# Patient Record
Sex: MALE | Race: BLACK OR AFRICAN AMERICAN | Employment: OTHER | URBAN - METROPOLITAN AREA
[De-identification: names, ages, dates, MRNs, and addresses within clinical notes are randomized per-mention and may not be internally consistent; named-entity substitution may affect disease eponyms.]

---

## 2017-02-10 ENCOUNTER — ANESTHESIA EVENT (OUTPATIENT)
Dept: OPERATING ROOM | Age: 67
End: 2017-02-10
Payer: MEDICARE

## 2017-02-13 ENCOUNTER — ANESTHESIA (OUTPATIENT)
Dept: OPERATING ROOM | Age: 67
End: 2017-02-13
Payer: MEDICARE

## 2017-02-13 ENCOUNTER — HOSPITAL ENCOUNTER (OUTPATIENT)
Age: 67
Setting detail: OUTPATIENT SURGERY
Discharge: HOME | End: 2017-02-13
Attending: INTERNAL MEDICINE | Admitting: INTERNAL MEDICINE
Payer: MEDICARE

## 2017-02-13 VITALS
BODY MASS INDEX: 34.38 KG/M2 | RESPIRATION RATE: 18 BRPM | SYSTOLIC BLOOD PRESSURE: 143 MMHG | OXYGEN SATURATION: 99 % | DIASTOLIC BLOOD PRESSURE: 65 MMHG | HEIGHT: 69 IN | TEMPERATURE: 97 F | WEIGHT: 232.14 LBS | HEART RATE: 70 BPM

## 2017-02-13 VITALS — DIASTOLIC BLOOD PRESSURE: 62 MMHG | SYSTOLIC BLOOD PRESSURE: 141 MMHG | OXYGEN SATURATION: 100 %

## 2017-02-13 LAB
CULTURE: NORMAL
CULTURE: NORMAL
Lab: NORMAL
SPECIMEN DESCRIPTION: NORMAL
STATUS: NORMAL

## 2017-02-13 PROCEDURE — 2580000003 HC RX 258: Performed by: ANESTHESIOLOGY

## 2017-02-13 PROCEDURE — 6360000002 HC RX W HCPCS: Performed by: ANESTHESIOLOGY

## 2017-02-13 PROCEDURE — 93005 ELECTROCARDIOGRAM TRACING: CPT

## 2017-02-13 PROCEDURE — 88305 TISSUE EXAM BY PATHOLOGIST: CPT

## 2017-02-13 PROCEDURE — 3609017100 HC EGD: Performed by: INTERNAL MEDICINE

## 2017-02-13 PROCEDURE — 88112 CYTOPATH CELL ENHANCE TECH: CPT

## 2017-02-13 PROCEDURE — 3700000001 HC ADD 15 MINUTES (ANESTHESIA): Performed by: INTERNAL MEDICINE

## 2017-02-13 PROCEDURE — 2500000003 HC RX 250 WO HCPCS: Performed by: ANESTHESIOLOGY

## 2017-02-13 PROCEDURE — 88312 SPECIAL STAINS GROUP 1: CPT

## 2017-02-13 PROCEDURE — 6360000002 HC RX W HCPCS: Performed by: NURSE ANESTHETIST, CERTIFIED REGISTERED

## 2017-02-13 PROCEDURE — 3700000000 HC ANESTHESIA ATTENDED CARE: Performed by: INTERNAL MEDICINE

## 2017-02-13 PROCEDURE — 7100000011 HC PHASE II RECOVERY - ADDTL 15 MIN: Performed by: INTERNAL MEDICINE

## 2017-02-13 PROCEDURE — 7100000010 HC PHASE II RECOVERY - FIRST 15 MIN: Performed by: INTERNAL MEDICINE

## 2017-02-13 RX ORDER — ONDANSETRON 2 MG/ML
4 INJECTION INTRAMUSCULAR; INTRAVENOUS
Status: COMPLETED | OUTPATIENT
Start: 2017-02-13 | End: 2017-02-13

## 2017-02-13 RX ORDER — LIDOCAINE HYDROCHLORIDE 10 MG/ML
1 INJECTION, SOLUTION EPIDURAL; INFILTRATION; INTRACAUDAL; PERINEURAL
Status: COMPLETED | OUTPATIENT
Start: 2017-02-13 | End: 2017-02-13

## 2017-02-13 RX ORDER — PROMETHAZINE HYDROCHLORIDE 25 MG/ML
12.5 INJECTION, SOLUTION INTRAMUSCULAR; INTRAVENOUS
Status: DISCONTINUED | OUTPATIENT
Start: 2017-02-13 | End: 2017-02-13 | Stop reason: HOSPADM

## 2017-02-13 RX ORDER — DIPHENHYDRAMINE HYDROCHLORIDE 50 MG/ML
12.5 INJECTION INTRAMUSCULAR; INTRAVENOUS
Status: DISCONTINUED | OUTPATIENT
Start: 2017-02-13 | End: 2017-02-13 | Stop reason: HOSPADM

## 2017-02-13 RX ORDER — SODIUM CHLORIDE, SODIUM LACTATE, POTASSIUM CHLORIDE, CALCIUM CHLORIDE 600; 310; 30; 20 MG/100ML; MG/100ML; MG/100ML; MG/100ML
INJECTION, SOLUTION INTRAVENOUS CONTINUOUS
Status: DISCONTINUED | OUTPATIENT
Start: 2017-02-13 | End: 2017-02-13 | Stop reason: HOSPADM

## 2017-02-13 RX ORDER — PROPOFOL 10 MG/ML
INJECTION, EMULSION INTRAVENOUS PRN
Status: DISCONTINUED | OUTPATIENT
Start: 2017-02-13 | End: 2017-02-13 | Stop reason: SDUPTHER

## 2017-02-13 RX ADMIN — PROPOFOL 25 MG: 10 INJECTION, EMULSION INTRAVENOUS at 08:18

## 2017-02-13 RX ADMIN — PROPOFOL 100 MG: 10 INJECTION, EMULSION INTRAVENOUS at 08:25

## 2017-02-13 RX ADMIN — ONDANSETRON 4 MG: 2 INJECTION INTRAMUSCULAR; INTRAVENOUS at 08:40

## 2017-02-13 RX ADMIN — PROPOFOL 50 MG: 10 INJECTION, EMULSION INTRAVENOUS at 08:15

## 2017-02-13 RX ADMIN — SODIUM CHLORIDE, POTASSIUM CHLORIDE, SODIUM LACTATE AND CALCIUM CHLORIDE: 600; 310; 30; 20 INJECTION, SOLUTION INTRAVENOUS at 07:04

## 2017-02-13 RX ADMIN — LIDOCAINE HYDROCHLORIDE 50 MG: 10 INJECTION, SOLUTION EPIDURAL; INFILTRATION; INTRACAUDAL; PERINEURAL at 08:15

## 2017-02-13 ASSESSMENT — PAIN SCALES - GENERAL
PAINLEVEL_OUTOF10: 0

## 2017-02-13 ASSESSMENT — ENCOUNTER SYMPTOMS: SHORTNESS OF BREATH: 0

## 2017-02-13 ASSESSMENT — PAIN - FUNCTIONAL ASSESSMENT: PAIN_FUNCTIONAL_ASSESSMENT: 0-10

## 2017-02-14 LAB — SURGICAL PATHOLOGY REPORT: NORMAL

## 2017-02-16 LAB
EKG ATRIAL RATE: 63 BPM
EKG P AXIS: 35 DEGREES
EKG P-R INTERVAL: 190 MS
EKG Q-T INTERVAL: 426 MS
EKG QRS DURATION: 86 MS
EKG QTC CALCULATION (BAZETT): 435 MS
EKG R AXIS: 12 DEGREES
EKG T AXIS: 174 DEGREES
EKG VENTRICULAR RATE: 63 BPM

## 2017-03-17 ENCOUNTER — HOSPITAL ENCOUNTER (OUTPATIENT)
Age: 67
Setting detail: OUTPATIENT SURGERY
Discharge: HOME | End: 2017-03-17
Attending: INTERNAL MEDICINE | Admitting: INTERNAL MEDICINE
Payer: MEDICARE

## 2017-03-17 ENCOUNTER — ANESTHESIA (OUTPATIENT)
Dept: ENDOSCOPY | Age: 67
End: 2017-03-17
Payer: MEDICARE

## 2017-03-17 ENCOUNTER — ANESTHESIA EVENT (OUTPATIENT)
Dept: ENDOSCOPY | Age: 67
End: 2017-03-17
Payer: MEDICARE

## 2017-03-17 VITALS
HEIGHT: 69 IN | RESPIRATION RATE: 22 BRPM | SYSTOLIC BLOOD PRESSURE: 163 MMHG | OXYGEN SATURATION: 97 % | WEIGHT: 228 LBS | BODY MASS INDEX: 33.77 KG/M2 | DIASTOLIC BLOOD PRESSURE: 71 MMHG | TEMPERATURE: 97.5 F | HEART RATE: 74 BPM

## 2017-03-17 VITALS
SYSTOLIC BLOOD PRESSURE: 127 MMHG | RESPIRATION RATE: 19 BRPM | OXYGEN SATURATION: 97 % | DIASTOLIC BLOOD PRESSURE: 81 MMHG

## 2017-03-17 LAB
BUN BLDV-MCNC: 10 MG/DL (ref 8–23)
CREAT SERPL-MCNC: 0.95 MG/DL (ref 0.7–1.2)
GFR AFRICAN AMERICAN: >60 ML/MIN
GFR NON-AFRICAN AMERICAN: >60 ML/MIN
GFR SERPL CREATININE-BSD FRML MDRD: NORMAL ML/MIN/{1.73_M2}
GFR SERPL CREATININE-BSD FRML MDRD: NORMAL ML/MIN/{1.73_M2}

## 2017-03-17 PROCEDURE — 3700000001 HC ADD 15 MINUTES (ANESTHESIA): Performed by: INTERNAL MEDICINE

## 2017-03-17 PROCEDURE — 2500000003 HC RX 250 WO HCPCS: Performed by: NURSE ANESTHETIST, CERTIFIED REGISTERED

## 2017-03-17 PROCEDURE — 3700000000 HC ANESTHESIA ATTENDED CARE: Performed by: INTERNAL MEDICINE

## 2017-03-17 PROCEDURE — 7100000011 HC PHASE II RECOVERY - ADDTL 15 MIN: Performed by: INTERNAL MEDICINE

## 2017-03-17 PROCEDURE — 6360000002 HC RX W HCPCS: Performed by: NURSE ANESTHETIST, CERTIFIED REGISTERED

## 2017-03-17 PROCEDURE — 3609017100 HC EGD: Performed by: INTERNAL MEDICINE

## 2017-03-17 PROCEDURE — 2580000003 HC RX 258: Performed by: INTERNAL MEDICINE

## 2017-03-17 PROCEDURE — 82565 ASSAY OF CREATININE: CPT

## 2017-03-17 PROCEDURE — 93005 ELECTROCARDIOGRAM TRACING: CPT

## 2017-03-17 PROCEDURE — 7100000010 HC PHASE II RECOVERY - FIRST 15 MIN: Performed by: INTERNAL MEDICINE

## 2017-03-17 PROCEDURE — 84520 ASSAY OF UREA NITROGEN: CPT

## 2017-03-17 RX ORDER — PROPOFOL 10 MG/ML
INJECTION, EMULSION INTRAVENOUS PRN
Status: DISCONTINUED | OUTPATIENT
Start: 2017-03-17 | End: 2017-03-17 | Stop reason: SDUPTHER

## 2017-03-17 RX ORDER — LIDOCAINE HYDROCHLORIDE 5 MG/ML
INJECTION, SOLUTION INFILTRATION; INTRAVENOUS PRN
Status: DISCONTINUED | OUTPATIENT
Start: 2017-03-17 | End: 2017-03-17 | Stop reason: SDUPTHER

## 2017-03-17 RX ORDER — SODIUM CHLORIDE 9 MG/ML
INJECTION, SOLUTION INTRAVENOUS CONTINUOUS
Status: DISCONTINUED | OUTPATIENT
Start: 2017-03-17 | End: 2017-03-17 | Stop reason: HOSPADM

## 2017-03-17 RX ORDER — GLYCOPYRROLATE 0.2 MG/ML
INJECTION INTRAMUSCULAR; INTRAVENOUS PRN
Status: DISCONTINUED | OUTPATIENT
Start: 2017-03-17 | End: 2017-03-17 | Stop reason: SDUPTHER

## 2017-03-17 RX ADMIN — LIDOCAINE HYDROCHLORIDE 30 MG: 5 INJECTION, SOLUTION INFILTRATION; INTRAVENOUS at 08:48

## 2017-03-17 RX ADMIN — LIDOCAINE HYDROCHLORIDE 10 MG: 5 INJECTION, SOLUTION INFILTRATION; INTRAVENOUS at 08:38

## 2017-03-17 RX ADMIN — PROPOFOL 10 MG: 10 INJECTION, EMULSION INTRAVENOUS at 08:50

## 2017-03-17 RX ADMIN — PROPOFOL 10 MG: 10 INJECTION, EMULSION INTRAVENOUS at 08:52

## 2017-03-17 RX ADMIN — PROPOFOL 10 MG: 10 INJECTION, EMULSION INTRAVENOUS at 08:48

## 2017-03-17 RX ADMIN — PROPOFOL 10 MG: 10 INJECTION, EMULSION INTRAVENOUS at 08:44

## 2017-03-17 RX ADMIN — PROPOFOL 10 MG: 10 INJECTION, EMULSION INTRAVENOUS at 08:55

## 2017-03-17 RX ADMIN — GLYCOPYRROLATE 0.2 MG: 0.2 INJECTION, SOLUTION INTRAMUSCULAR; INTRAVENOUS at 08:38

## 2017-03-17 RX ADMIN — LIDOCAINE HYDROCHLORIDE 10 MG: 5 INJECTION, SOLUTION INFILTRATION; INTRAVENOUS at 08:55

## 2017-03-17 RX ADMIN — PROPOFOL 10 MG: 10 INJECTION, EMULSION INTRAVENOUS at 08:39

## 2017-03-17 RX ADMIN — PROPOFOL 10 MG: 10 INJECTION, EMULSION INTRAVENOUS at 08:51

## 2017-03-17 RX ADMIN — PROPOFOL 10 MG: 10 INJECTION, EMULSION INTRAVENOUS at 08:45

## 2017-03-17 RX ADMIN — PROPOFOL 10 MG: 10 INJECTION, EMULSION INTRAVENOUS at 08:56

## 2017-03-17 RX ADMIN — LIDOCAINE HYDROCHLORIDE 10 MG: 5 INJECTION, SOLUTION INFILTRATION; INTRAVENOUS at 08:43

## 2017-03-17 RX ADMIN — PROPOFOL 10 MG: 10 INJECTION, EMULSION INTRAVENOUS at 08:54

## 2017-03-17 RX ADMIN — PROPOFOL 10 MG: 10 INJECTION, EMULSION INTRAVENOUS at 08:41

## 2017-03-17 RX ADMIN — PROPOFOL 10 MG: 10 INJECTION, EMULSION INTRAVENOUS at 08:46

## 2017-03-17 RX ADMIN — PROPOFOL 10 MG: 10 INJECTION, EMULSION INTRAVENOUS at 08:38

## 2017-03-17 RX ADMIN — PROPOFOL 10 MG: 10 INJECTION, EMULSION INTRAVENOUS at 08:53

## 2017-03-17 RX ADMIN — PROPOFOL 10 MG: 10 INJECTION, EMULSION INTRAVENOUS at 08:49

## 2017-03-17 RX ADMIN — PROPOFOL 10 MG: 10 INJECTION, EMULSION INTRAVENOUS at 08:40

## 2017-03-17 RX ADMIN — PROPOFOL 10 MG: 10 INJECTION, EMULSION INTRAVENOUS at 08:43

## 2017-03-17 RX ADMIN — PROPOFOL 10 MG: 10 INJECTION, EMULSION INTRAVENOUS at 08:42

## 2017-03-17 RX ADMIN — SODIUM CHLORIDE: 9 INJECTION, SOLUTION INTRAVENOUS at 08:23

## 2017-03-17 ASSESSMENT — PAIN - FUNCTIONAL ASSESSMENT: PAIN_FUNCTIONAL_ASSESSMENT: 0-10

## 2017-03-17 ASSESSMENT — PAIN SCALES - GENERAL
PAINLEVEL_OUTOF10: 0

## 2017-03-22 LAB
EKG ATRIAL RATE: 77 BPM
EKG P AXIS: 70 DEGREES
EKG P-R INTERVAL: 168 MS
EKG Q-T INTERVAL: 398 MS
EKG QRS DURATION: 96 MS
EKG QTC CALCULATION (BAZETT): 450 MS
EKG R AXIS: -11 DEGREES
EKG T AXIS: 157 DEGREES
EKG VENTRICULAR RATE: 77 BPM

## 2017-04-28 ENCOUNTER — ANESTHESIA (OUTPATIENT)
Dept: ENDOSCOPY | Age: 67
End: 2017-04-28
Payer: MEDICARE

## 2017-04-28 ENCOUNTER — HOSPITAL ENCOUNTER (OUTPATIENT)
Age: 67
Setting detail: OUTPATIENT SURGERY
Discharge: HOME | End: 2017-04-28
Attending: INTERNAL MEDICINE | Admitting: INTERNAL MEDICINE
Payer: MEDICARE

## 2017-04-28 ENCOUNTER — ANESTHESIA EVENT (OUTPATIENT)
Dept: ENDOSCOPY | Age: 67
End: 2017-04-28
Payer: MEDICARE

## 2017-04-28 VITALS
SYSTOLIC BLOOD PRESSURE: 137 MMHG | DIASTOLIC BLOOD PRESSURE: 69 MMHG | RESPIRATION RATE: 14 BRPM | OXYGEN SATURATION: 96 %

## 2017-04-28 VITALS
TEMPERATURE: 98.1 F | BODY MASS INDEX: 35.55 KG/M2 | SYSTOLIC BLOOD PRESSURE: 131 MMHG | OXYGEN SATURATION: 96 % | RESPIRATION RATE: 21 BRPM | HEART RATE: 67 BPM | HEIGHT: 69 IN | DIASTOLIC BLOOD PRESSURE: 69 MMHG | WEIGHT: 240 LBS

## 2017-04-28 PROCEDURE — 2580000003 HC RX 258: Performed by: NURSE ANESTHETIST, CERTIFIED REGISTERED

## 2017-04-28 PROCEDURE — 6360000002 HC RX W HCPCS: Performed by: NURSE ANESTHETIST, CERTIFIED REGISTERED

## 2017-04-28 PROCEDURE — 7100000011 HC PHASE II RECOVERY - ADDTL 15 MIN: Performed by: INTERNAL MEDICINE

## 2017-04-28 PROCEDURE — 3700000000 HC ANESTHESIA ATTENDED CARE: Performed by: INTERNAL MEDICINE

## 2017-04-28 PROCEDURE — 3609017700 HC EGD DILATION GASTRIC/DUODENAL STRICTURE: Performed by: INTERNAL MEDICINE

## 2017-04-28 PROCEDURE — 3700000001 HC ADD 15 MINUTES (ANESTHESIA): Performed by: INTERNAL MEDICINE

## 2017-04-28 PROCEDURE — 7100000010 HC PHASE II RECOVERY - FIRST 15 MIN: Performed by: INTERNAL MEDICINE

## 2017-04-28 PROCEDURE — 2580000003 HC RX 258: Performed by: INTERNAL MEDICINE

## 2017-04-28 PROCEDURE — 2500000003 HC RX 250 WO HCPCS: Performed by: NURSE ANESTHETIST, CERTIFIED REGISTERED

## 2017-04-28 RX ORDER — SODIUM CHLORIDE 9 MG/ML
INJECTION, SOLUTION INTRAVENOUS CONTINUOUS PRN
Status: DISCONTINUED | OUTPATIENT
Start: 2017-04-28 | End: 2017-04-28 | Stop reason: SDUPTHER

## 2017-04-28 RX ORDER — LIDOCAINE HYDROCHLORIDE 10 MG/ML
INJECTION, SOLUTION EPIDURAL; INFILTRATION; INTRACAUDAL; PERINEURAL PRN
Status: DISCONTINUED | OUTPATIENT
Start: 2017-04-28 | End: 2017-04-28 | Stop reason: SDUPTHER

## 2017-04-28 RX ORDER — PROPOFOL 10 MG/ML
INJECTION, EMULSION INTRAVENOUS PRN
Status: DISCONTINUED | OUTPATIENT
Start: 2017-04-28 | End: 2017-04-28 | Stop reason: SDUPTHER

## 2017-04-28 RX ORDER — SODIUM CHLORIDE 9 MG/ML
INJECTION, SOLUTION INTRAVENOUS CONTINUOUS
Status: DISCONTINUED | OUTPATIENT
Start: 2017-04-28 | End: 2017-04-28 | Stop reason: HOSPADM

## 2017-04-28 RX ORDER — PROPOFOL 10 MG/ML
INJECTION, EMULSION INTRAVENOUS CONTINUOUS PRN
Status: DISCONTINUED | OUTPATIENT
Start: 2017-04-28 | End: 2017-04-28 | Stop reason: SDUPTHER

## 2017-04-28 RX ORDER — GLYCOPYRROLATE 0.2 MG/ML
INJECTION INTRAMUSCULAR; INTRAVENOUS PRN
Status: DISCONTINUED | OUTPATIENT
Start: 2017-04-28 | End: 2017-04-28 | Stop reason: SDUPTHER

## 2017-04-28 RX ADMIN — PROPOFOL 80 MG: 10 INJECTION, EMULSION INTRAVENOUS at 09:21

## 2017-04-28 RX ADMIN — GLYCOPYRROLATE 0.2 MG: 0.2 INJECTION, SOLUTION INTRAMUSCULAR; INTRAVENOUS at 09:21

## 2017-04-28 RX ADMIN — LIDOCAINE HYDROCHLORIDE 50 MG: 10 INJECTION, SOLUTION EPIDURAL; INFILTRATION; INTRACAUDAL; PERINEURAL at 09:21

## 2017-04-28 RX ADMIN — SODIUM CHLORIDE: 9 INJECTION, SOLUTION INTRAVENOUS at 09:11

## 2017-04-28 RX ADMIN — PROPOFOL 50 MCG/KG/MIN: 10 INJECTION, EMULSION INTRAVENOUS at 09:21

## 2017-04-28 RX ADMIN — SODIUM CHLORIDE: 9 INJECTION, SOLUTION INTRAVENOUS at 09:17

## 2017-04-28 RX ADMIN — PROPOFOL 30 MG: 10 INJECTION, EMULSION INTRAVENOUS at 09:29

## 2017-04-28 ASSESSMENT — PAIN SCALES - GENERAL
PAINLEVEL_OUTOF10: 0

## 2017-04-28 ASSESSMENT — PAIN - FUNCTIONAL ASSESSMENT: PAIN_FUNCTIONAL_ASSESSMENT: 0-10

## 2017-04-28 ASSESSMENT — ENCOUNTER SYMPTOMS: SHORTNESS OF BREATH: 0

## 2017-05-19 ENCOUNTER — HOSPITAL ENCOUNTER (OUTPATIENT)
Age: 67
Setting detail: OUTPATIENT SURGERY
Discharge: HOME | End: 2017-05-19
Attending: INTERNAL MEDICINE | Admitting: INTERNAL MEDICINE
Payer: MEDICARE

## 2017-05-19 ENCOUNTER — ANESTHESIA (OUTPATIENT)
Dept: ENDOSCOPY | Age: 67
End: 2017-05-19
Payer: MEDICARE

## 2017-05-19 ENCOUNTER — ANESTHESIA EVENT (OUTPATIENT)
Dept: ENDOSCOPY | Age: 67
End: 2017-05-19
Payer: MEDICARE

## 2017-05-19 VITALS
OXYGEN SATURATION: 97 % | SYSTOLIC BLOOD PRESSURE: 145 MMHG | BODY MASS INDEX: 34.36 KG/M2 | HEIGHT: 70 IN | RESPIRATION RATE: 21 BRPM | HEART RATE: 68 BPM | DIASTOLIC BLOOD PRESSURE: 78 MMHG | WEIGHT: 240 LBS | TEMPERATURE: 97.9 F

## 2017-05-19 VITALS
SYSTOLIC BLOOD PRESSURE: 149 MMHG | OXYGEN SATURATION: 96 % | DIASTOLIC BLOOD PRESSURE: 99 MMHG | RESPIRATION RATE: 19 BRPM

## 2017-05-19 PROCEDURE — 7100000010 HC PHASE II RECOVERY - FIRST 15 MIN: Performed by: INTERNAL MEDICINE

## 2017-05-19 PROCEDURE — 3700000001 HC ADD 15 MINUTES (ANESTHESIA): Performed by: INTERNAL MEDICINE

## 2017-05-19 PROCEDURE — 2580000003 HC RX 258: Performed by: INTERNAL MEDICINE

## 2017-05-19 PROCEDURE — 3700000000 HC ANESTHESIA ATTENDED CARE: Performed by: INTERNAL MEDICINE

## 2017-05-19 PROCEDURE — 2500000003 HC RX 250 WO HCPCS: Performed by: NURSE ANESTHETIST, CERTIFIED REGISTERED

## 2017-05-19 PROCEDURE — 7100000011 HC PHASE II RECOVERY - ADDTL 15 MIN: Performed by: INTERNAL MEDICINE

## 2017-05-19 PROCEDURE — 3609017700 HC EGD DILATION GASTRIC/DUODENAL STRICTURE: Performed by: INTERNAL MEDICINE

## 2017-05-19 PROCEDURE — 2580000003 HC RX 258: Performed by: NURSE ANESTHETIST, CERTIFIED REGISTERED

## 2017-05-19 PROCEDURE — 6360000002 HC RX W HCPCS: Performed by: NURSE ANESTHETIST, CERTIFIED REGISTERED

## 2017-05-19 RX ORDER — PROPOFOL 10 MG/ML
INJECTION, EMULSION INTRAVENOUS PRN
Status: DISCONTINUED | OUTPATIENT
Start: 2017-05-19 | End: 2017-05-19 | Stop reason: SDUPTHER

## 2017-05-19 RX ORDER — SODIUM CHLORIDE 9 MG/ML
INJECTION, SOLUTION INTRAVENOUS CONTINUOUS PRN
Status: DISCONTINUED | OUTPATIENT
Start: 2017-05-19 | End: 2017-05-19 | Stop reason: SDUPTHER

## 2017-05-19 RX ORDER — SODIUM CHLORIDE 9 MG/ML
INJECTION, SOLUTION INTRAVENOUS CONTINUOUS
Status: DISCONTINUED | OUTPATIENT
Start: 2017-05-19 | End: 2017-05-19 | Stop reason: HOSPADM

## 2017-05-19 RX ORDER — LIDOCAINE HYDROCHLORIDE 10 MG/ML
INJECTION, SOLUTION INFILTRATION; PERINEURAL PRN
Status: DISCONTINUED | OUTPATIENT
Start: 2017-05-19 | End: 2017-05-19 | Stop reason: SDUPTHER

## 2017-05-19 RX ADMIN — SODIUM CHLORIDE: 9 INJECTION, SOLUTION INTRAVENOUS at 08:06

## 2017-05-19 RX ADMIN — PROPOFOL 50 MG: 10 INJECTION, EMULSION INTRAVENOUS at 08:11

## 2017-05-19 RX ADMIN — SODIUM CHLORIDE: 9 INJECTION, SOLUTION INTRAVENOUS at 07:26

## 2017-05-19 RX ADMIN — PROPOFOL 50 MG: 10 INJECTION, EMULSION INTRAVENOUS at 08:15

## 2017-05-19 RX ADMIN — PROPOFOL 30 MG: 10 INJECTION, EMULSION INTRAVENOUS at 08:12

## 2017-05-19 RX ADMIN — LIDOCAINE HYDROCHLORIDE 30 MG: 10 INJECTION, SOLUTION INFILTRATION; PERINEURAL at 08:09

## 2017-05-19 RX ADMIN — PROPOFOL 120 MG: 10 INJECTION, EMULSION INTRAVENOUS at 08:09

## 2017-05-19 ASSESSMENT — PAIN SCALES - GENERAL
PAINLEVEL_OUTOF10: 0

## 2017-05-19 ASSESSMENT — PAIN - FUNCTIONAL ASSESSMENT: PAIN_FUNCTIONAL_ASSESSMENT: 0-10

## 2017-06-02 ENCOUNTER — ANESTHESIA EVENT (OUTPATIENT)
Dept: ENDOSCOPY | Age: 67
End: 2017-06-02
Payer: MEDICARE

## 2017-06-02 ENCOUNTER — ANESTHESIA (OUTPATIENT)
Dept: ENDOSCOPY | Age: 67
End: 2017-06-02
Payer: MEDICARE

## 2017-06-02 ENCOUNTER — HOSPITAL ENCOUNTER (OUTPATIENT)
Age: 67
Setting detail: OUTPATIENT SURGERY
Discharge: HOME | End: 2017-06-02
Attending: INTERNAL MEDICINE | Admitting: INTERNAL MEDICINE
Payer: MEDICARE

## 2017-06-02 VITALS — SYSTOLIC BLOOD PRESSURE: 143 MMHG | RESPIRATION RATE: 16 BRPM | DIASTOLIC BLOOD PRESSURE: 84 MMHG

## 2017-06-02 VITALS
DIASTOLIC BLOOD PRESSURE: 85 MMHG | OXYGEN SATURATION: 99 % | BODY MASS INDEX: 34.22 KG/M2 | SYSTOLIC BLOOD PRESSURE: 144 MMHG | WEIGHT: 239 LBS | RESPIRATION RATE: 18 BRPM | HEIGHT: 70 IN | TEMPERATURE: 96.6 F | HEART RATE: 76 BPM

## 2017-06-02 PROCEDURE — 7100000010 HC PHASE II RECOVERY - FIRST 15 MIN: Performed by: INTERNAL MEDICINE

## 2017-06-02 PROCEDURE — C1769 GUIDE WIRE: HCPCS | Performed by: INTERNAL MEDICINE

## 2017-06-02 PROCEDURE — 2500000003 HC RX 250 WO HCPCS: Performed by: SPECIALIST

## 2017-06-02 PROCEDURE — 3609012700 HC EGD DILATION SAVORY: Performed by: INTERNAL MEDICINE

## 2017-06-02 PROCEDURE — 6360000002 HC RX W HCPCS: Performed by: SPECIALIST

## 2017-06-02 PROCEDURE — 2580000003 HC RX 258: Performed by: INTERNAL MEDICINE

## 2017-06-02 PROCEDURE — 3700000001 HC ADD 15 MINUTES (ANESTHESIA): Performed by: INTERNAL MEDICINE

## 2017-06-02 PROCEDURE — 7100000011 HC PHASE II RECOVERY - ADDTL 15 MIN: Performed by: INTERNAL MEDICINE

## 2017-06-02 PROCEDURE — 3700000000 HC ANESTHESIA ATTENDED CARE: Performed by: INTERNAL MEDICINE

## 2017-06-02 RX ORDER — PROPOFOL 10 MG/ML
INJECTION, EMULSION INTRAVENOUS PRN
Status: DISCONTINUED | OUTPATIENT
Start: 2017-06-02 | End: 2017-06-02 | Stop reason: SDUPTHER

## 2017-06-02 RX ORDER — GLYCOPYRROLATE 0.2 MG/ML
INJECTION INTRAMUSCULAR; INTRAVENOUS PRN
Status: DISCONTINUED | OUTPATIENT
Start: 2017-06-02 | End: 2017-06-02 | Stop reason: SDUPTHER

## 2017-06-02 RX ORDER — LIDOCAINE HYDROCHLORIDE 10 MG/ML
INJECTION, SOLUTION EPIDURAL; INFILTRATION; INTRACAUDAL; PERINEURAL PRN
Status: DISCONTINUED | OUTPATIENT
Start: 2017-06-02 | End: 2017-06-02 | Stop reason: SDUPTHER

## 2017-06-02 RX ORDER — SODIUM CHLORIDE 9 MG/ML
INJECTION, SOLUTION INTRAVENOUS CONTINUOUS
Status: DISCONTINUED | OUTPATIENT
Start: 2017-06-02 | End: 2017-06-02 | Stop reason: HOSPADM

## 2017-06-02 RX ADMIN — SODIUM CHLORIDE: 9 INJECTION, SOLUTION INTRAVENOUS at 09:27

## 2017-06-02 RX ADMIN — PROPOFOL 50 MG: 10 INJECTION, EMULSION INTRAVENOUS at 10:15

## 2017-06-02 RX ADMIN — PROPOFOL 50 MG: 10 INJECTION, EMULSION INTRAVENOUS at 10:04

## 2017-06-02 RX ADMIN — LIDOCAINE HYDROCHLORIDE 30 MG: 10 INJECTION, SOLUTION EPIDURAL; INFILTRATION; INTRACAUDAL; PERINEURAL at 10:04

## 2017-06-02 RX ADMIN — PROPOFOL 100 MG: 10 INJECTION, EMULSION INTRAVENOUS at 10:05

## 2017-06-02 RX ADMIN — GLYCOPYRROLATE 0.2 MG: 0.2 INJECTION INTRAMUSCULAR; INTRAVENOUS at 09:58

## 2017-06-02 ASSESSMENT — PAIN DESCRIPTION - LOCATION: LOCATION: THROAT

## 2017-06-02 ASSESSMENT — PAIN SCALES - GENERAL
PAINLEVEL_OUTOF10: 0
PAINLEVEL_OUTOF10: 6
PAINLEVEL_OUTOF10: 0

## 2017-06-02 ASSESSMENT — PAIN DESCRIPTION - DESCRIPTORS: DESCRIPTORS: SORE

## 2017-06-02 ASSESSMENT — PAIN - FUNCTIONAL ASSESSMENT: PAIN_FUNCTIONAL_ASSESSMENT: 0-10

## 2017-06-02 ASSESSMENT — PAIN DESCRIPTION - PAIN TYPE: TYPE: ACUTE PAIN

## 2017-08-22 ENCOUNTER — HOSPITAL ENCOUNTER (OUTPATIENT)
Age: 67
Setting detail: OBSERVATION
Discharge: HOME | End: 2017-08-23
Attending: EMERGENCY MEDICINE | Admitting: EMERGENCY MEDICINE
Payer: MEDICARE

## 2017-08-22 ENCOUNTER — APPOINTMENT (OUTPATIENT)
Dept: GENERAL RADIOLOGY | Age: 67
End: 2017-08-22
Payer: MEDICARE

## 2017-08-22 DIAGNOSIS — R07.9 CHEST PAIN, UNSPECIFIED TYPE: Primary | ICD-10-CM

## 2017-08-22 LAB
ABSOLUTE EOS #: 0.2 K/UL (ref 0–0.4)
ABSOLUTE LYMPH #: 1 K/UL (ref 1–4.8)
ABSOLUTE MONO #: 0.7 K/UL (ref 0.1–1.2)
ANION GAP SERPL CALCULATED.3IONS-SCNC: 14 MMOL/L (ref 9–17)
BASOPHILS # BLD: 1 %
BASOPHILS ABSOLUTE: 0 K/UL (ref 0–0.2)
BNP INTERPRETATION: ABNORMAL
BUN BLDV-MCNC: 18 MG/DL (ref 8–23)
BUN/CREAT BLD: ABNORMAL (ref 9–20)
CALCIUM SERPL-MCNC: 9.1 MG/DL (ref 8.6–10.4)
CHLORIDE BLD-SCNC: 99 MMOL/L (ref 98–107)
CO2: 25 MMOL/L (ref 20–31)
CREAT SERPL-MCNC: 1.17 MG/DL (ref 0.7–1.2)
D-DIMER QUANTITATIVE: 0.53 MG/L FEU
DIFFERENTIAL TYPE: ABNORMAL
EOSINOPHILS RELATIVE PERCENT: 4 %
GFR AFRICAN AMERICAN: >60 ML/MIN
GFR NON-AFRICAN AMERICAN: >60 ML/MIN
GFR SERPL CREATININE-BSD FRML MDRD: ABNORMAL ML/MIN/{1.73_M2}
GFR SERPL CREATININE-BSD FRML MDRD: ABNORMAL ML/MIN/{1.73_M2}
GLUCOSE BLD-MCNC: 103 MG/DL (ref 70–99)
HCT VFR BLD CALC: 41.9 % (ref 41–53)
HEMOGLOBIN: 13.9 G/DL (ref 13.5–17.5)
LYMPHOCYTES # BLD: 26 %
MCH RBC QN AUTO: 28.2 PG (ref 26–34)
MCHC RBC AUTO-ENTMCNC: 33.3 G/DL (ref 31–37)
MCV RBC AUTO: 84.9 FL (ref 80–100)
MONOCYTES # BLD: 17 %
PDW BLD-RTO: 15.7 % (ref 12.5–15.4)
PLATELET # BLD: 299 K/UL (ref 140–450)
PLATELET ESTIMATE: ABNORMAL
PMV BLD AUTO: 8.2 FL (ref 6–12)
POC TROPONIN I: 0 NG/ML (ref 0–0.1)
POC TROPONIN I: 0.01 NG/ML (ref 0–0.1)
POC TROPONIN INTERP: NORMAL
POC TROPONIN INTERP: NORMAL
POTASSIUM SERPL-SCNC: 4.6 MMOL/L (ref 3.7–5.3)
PRO-BNP: 300 PG/ML
RBC # BLD: 4.94 M/UL (ref 4.5–5.9)
RBC # BLD: ABNORMAL 10*6/UL
SEG NEUTROPHILS: 52 %
SEGMENTED NEUTROPHILS ABSOLUTE COUNT: 2.2 K/UL (ref 1.8–7.7)
SODIUM BLD-SCNC: 138 MMOL/L (ref 135–144)
WBC # BLD: 4.1 K/UL (ref 3.5–11)
WBC # BLD: ABNORMAL 10*3/UL

## 2017-08-22 PROCEDURE — 80048 BASIC METABOLIC PNL TOTAL CA: CPT

## 2017-08-22 PROCEDURE — 96376 TX/PRO/DX INJ SAME DRUG ADON: CPT

## 2017-08-22 PROCEDURE — G0378 HOSPITAL OBSERVATION PER HR: HCPCS

## 2017-08-22 PROCEDURE — 2580000003 HC RX 258: Performed by: EMERGENCY MEDICINE

## 2017-08-22 PROCEDURE — 6360000002 HC RX W HCPCS: Performed by: EMERGENCY MEDICINE

## 2017-08-22 PROCEDURE — 83880 ASSAY OF NATRIURETIC PEPTIDE: CPT

## 2017-08-22 PROCEDURE — 71020 XR CHEST STANDARD TWO VW: CPT

## 2017-08-22 PROCEDURE — 85379 FIBRIN DEGRADATION QUANT: CPT

## 2017-08-22 PROCEDURE — 85025 COMPLETE CBC W/AUTO DIFF WBC: CPT

## 2017-08-22 PROCEDURE — 96374 THER/PROPH/DIAG INJ IV PUSH: CPT

## 2017-08-22 PROCEDURE — 96375 TX/PRO/DX INJ NEW DRUG ADDON: CPT

## 2017-08-22 PROCEDURE — 99285 EMERGENCY DEPT VISIT HI MDM: CPT

## 2017-08-22 PROCEDURE — 93005 ELECTROCARDIOGRAM TRACING: CPT

## 2017-08-22 PROCEDURE — 84484 ASSAY OF TROPONIN QUANT: CPT

## 2017-08-22 RX ORDER — MORPHINE SULFATE 4 MG/ML
4 INJECTION, SOLUTION INTRAMUSCULAR; INTRAVENOUS EVERY 4 HOURS PRN
Status: DISCONTINUED | OUTPATIENT
Start: 2017-08-22 | End: 2017-08-23

## 2017-08-22 RX ORDER — ONDANSETRON 2 MG/ML
4 INJECTION INTRAMUSCULAR; INTRAVENOUS ONCE
Status: COMPLETED | OUTPATIENT
Start: 2017-08-22 | End: 2017-08-22

## 2017-08-22 RX ORDER — MORPHINE SULFATE 2 MG/ML
2 INJECTION, SOLUTION INTRAMUSCULAR; INTRAVENOUS EVERY 4 HOURS PRN
Status: DISCONTINUED | OUTPATIENT
Start: 2017-08-22 | End: 2017-08-23

## 2017-08-22 RX ORDER — SODIUM CHLORIDE 0.9 % (FLUSH) 0.9 %
10 SYRINGE (ML) INJECTION EVERY 12 HOURS SCHEDULED
Status: DISCONTINUED | OUTPATIENT
Start: 2017-08-22 | End: 2017-08-23 | Stop reason: HOSPADM

## 2017-08-22 RX ORDER — SODIUM CHLORIDE 0.9 % (FLUSH) 0.9 %
10 SYRINGE (ML) INJECTION PRN
Status: DISCONTINUED | OUTPATIENT
Start: 2017-08-22 | End: 2017-08-23 | Stop reason: HOSPADM

## 2017-08-22 RX ORDER — MORPHINE SULFATE 4 MG/ML
4 INJECTION, SOLUTION INTRAMUSCULAR; INTRAVENOUS ONCE
Status: COMPLETED | OUTPATIENT
Start: 2017-08-22 | End: 2017-08-22

## 2017-08-22 RX ORDER — ACETAMINOPHEN 325 MG/1
650 TABLET ORAL EVERY 4 HOURS PRN
Status: DISCONTINUED | OUTPATIENT
Start: 2017-08-22 | End: 2017-08-23 | Stop reason: HOSPADM

## 2017-08-22 RX ORDER — ONDANSETRON 2 MG/ML
4 INJECTION INTRAMUSCULAR; INTRAVENOUS EVERY 8 HOURS PRN
Status: DISCONTINUED | OUTPATIENT
Start: 2017-08-22 | End: 2017-08-23 | Stop reason: HOSPADM

## 2017-08-22 RX ORDER — SODIUM CHLORIDE 9 MG/ML
INJECTION, SOLUTION INTRAVENOUS CONTINUOUS
Status: DISCONTINUED | OUTPATIENT
Start: 2017-08-22 | End: 2017-08-23 | Stop reason: HOSPADM

## 2017-08-22 RX ADMIN — MORPHINE SULFATE 4 MG: 4 INJECTION, SOLUTION INTRAMUSCULAR; INTRAVENOUS at 22:48

## 2017-08-22 RX ADMIN — MORPHINE SULFATE 4 MG: 4 INJECTION, SOLUTION INTRAMUSCULAR; INTRAVENOUS at 19:25

## 2017-08-22 RX ADMIN — SODIUM CHLORIDE: 9 INJECTION, SOLUTION INTRAVENOUS at 22:59

## 2017-08-22 RX ADMIN — ONDANSETRON 4 MG: 2 INJECTION, SOLUTION INTRAMUSCULAR; INTRAVENOUS at 22:48

## 2017-08-22 RX ADMIN — ONDANSETRON 4 MG: 2 INJECTION, SOLUTION INTRAMUSCULAR; INTRAVENOUS at 19:25

## 2017-08-22 ASSESSMENT — PAIN DESCRIPTION - DESCRIPTORS
DESCRIPTORS: CONSTANT
DESCRIPTORS: CONSTANT

## 2017-08-22 ASSESSMENT — ENCOUNTER SYMPTOMS
VOMITING: 1
NAUSEA: 1
SHORTNESS OF BREATH: 0
ABDOMINAL PAIN: 0
RHINORRHEA: 0

## 2017-08-22 ASSESSMENT — PAIN DESCRIPTION - LOCATION
LOCATION: CHEST
LOCATION: CHEST

## 2017-08-22 ASSESSMENT — PAIN SCALES - GENERAL
PAINLEVEL_OUTOF10: 10
PAINLEVEL_OUTOF10: 8
PAINLEVEL_OUTOF10: 10
PAINLEVEL_OUTOF10: 6

## 2017-08-22 ASSESSMENT — PAIN DESCRIPTION - PAIN TYPE
TYPE: ACUTE PAIN
TYPE: ACUTE PAIN

## 2017-08-22 ASSESSMENT — PAIN DESCRIPTION - ORIENTATION
ORIENTATION: MID
ORIENTATION: RIGHT;MID

## 2017-08-23 ENCOUNTER — APPOINTMENT (OUTPATIENT)
Dept: NUCLEAR MEDICINE | Age: 67
End: 2017-08-23
Payer: MEDICARE

## 2017-08-23 VITALS
OXYGEN SATURATION: 99 % | HEART RATE: 56 BPM | SYSTOLIC BLOOD PRESSURE: 135 MMHG | TEMPERATURE: 97.3 F | RESPIRATION RATE: 18 BRPM | BODY MASS INDEX: 30.21 KG/M2 | DIASTOLIC BLOOD PRESSURE: 64 MMHG | HEIGHT: 70 IN | WEIGHT: 211 LBS

## 2017-08-23 LAB
EKG ATRIAL RATE: 55 BPM
EKG ATRIAL RATE: 66 BPM
EKG ATRIAL RATE: 71 BPM
EKG P AXIS: 43 DEGREES
EKG P AXIS: 49 DEGREES
EKG P AXIS: 55 DEGREES
EKG P-R INTERVAL: 166 MS
EKG P-R INTERVAL: 180 MS
EKG P-R INTERVAL: 196 MS
EKG Q-T INTERVAL: 396 MS
EKG Q-T INTERVAL: 426 MS
EKG Q-T INTERVAL: 462 MS
EKG QRS DURATION: 100 MS
EKG QRS DURATION: 90 MS
EKG QRS DURATION: 94 MS
EKG QTC CALCULATION (BAZETT): 430 MS
EKG QTC CALCULATION (BAZETT): 441 MS
EKG QTC CALCULATION (BAZETT): 446 MS
EKG R AXIS: -8 DEGREES
EKG R AXIS: 21 DEGREES
EKG R AXIS: 41 DEGREES
EKG T AXIS: -138 DEGREES
EKG T AXIS: 87 DEGREES
EKG T AXIS: 91 DEGREES
EKG VENTRICULAR RATE: 55 BPM
EKG VENTRICULAR RATE: 66 BPM
EKG VENTRICULAR RATE: 71 BPM
LV EF: 67 %
LVEF MODALITY: NORMAL
TROPONIN INTERP: NORMAL
TROPONIN T: <0.03 NG/ML

## 2017-08-23 PROCEDURE — 90670 PCV13 VACCINE IM: CPT | Performed by: EMERGENCY MEDICINE

## 2017-08-23 PROCEDURE — 93017 CV STRESS TEST TRACING ONLY: CPT

## 2017-08-23 PROCEDURE — 3430000000 HC RX DIAGNOSTIC RADIOPHARMACEUTICAL: Performed by: INTERNAL MEDICINE

## 2017-08-23 PROCEDURE — 6360000002 HC RX W HCPCS: Performed by: INTERNAL MEDICINE

## 2017-08-23 PROCEDURE — 96376 TX/PRO/DX INJ SAME DRUG ADON: CPT

## 2017-08-23 PROCEDURE — 93005 ELECTROCARDIOGRAM TRACING: CPT

## 2017-08-23 PROCEDURE — 6370000000 HC RX 637 (ALT 250 FOR IP): Performed by: EMERGENCY MEDICINE

## 2017-08-23 PROCEDURE — 78452 HT MUSCLE IMAGE SPECT MULT: CPT

## 2017-08-23 PROCEDURE — 2580000003 HC RX 258: Performed by: INTERNAL MEDICINE

## 2017-08-23 PROCEDURE — 94660 CPAP INITIATION&MGMT: CPT

## 2017-08-23 PROCEDURE — 84484 ASSAY OF TROPONIN QUANT: CPT

## 2017-08-23 PROCEDURE — A9500 TC99M SESTAMIBI: HCPCS | Performed by: INTERNAL MEDICINE

## 2017-08-23 PROCEDURE — 36415 COLL VENOUS BLD VENIPUNCTURE: CPT

## 2017-08-23 PROCEDURE — G0378 HOSPITAL OBSERVATION PER HR: HCPCS

## 2017-08-23 PROCEDURE — G0009 ADMIN PNEUMOCOCCAL VACCINE: HCPCS | Performed by: EMERGENCY MEDICINE

## 2017-08-23 PROCEDURE — 6360000002 HC RX W HCPCS: Performed by: EMERGENCY MEDICINE

## 2017-08-23 RX ORDER — OXYCODONE HYDROCHLORIDE AND ACETAMINOPHEN 5; 325 MG/1; MG/1
2 TABLET ORAL EVERY 4 HOURS PRN
Status: DISCONTINUED | OUTPATIENT
Start: 2017-08-23 | End: 2017-08-23 | Stop reason: HOSPADM

## 2017-08-23 RX ORDER — METOPROLOL TARTRATE 5 MG/5ML
2.5 INJECTION INTRAVENOUS PRN
Status: DISCONTINUED | OUTPATIENT
Start: 2017-08-23 | End: 2017-08-23

## 2017-08-23 RX ORDER — NITROGLYCERIN 0.4 MG/1
0.4 TABLET SUBLINGUAL EVERY 5 MIN PRN
Status: DISCONTINUED | OUTPATIENT
Start: 2017-08-23 | End: 2017-08-23

## 2017-08-23 RX ORDER — AMINOPHYLLINE DIHYDRATE 25 MG/ML
100 INJECTION, SOLUTION INTRAVENOUS
Status: COMPLETED | OUTPATIENT
Start: 2017-08-23 | End: 2017-08-23

## 2017-08-23 RX ORDER — OXYCODONE HYDROCHLORIDE AND ACETAMINOPHEN 5; 325 MG/1; MG/1
1 TABLET ORAL ONCE
Status: COMPLETED | OUTPATIENT
Start: 2017-08-23 | End: 2017-08-23

## 2017-08-23 RX ORDER — SODIUM CHLORIDE 9 MG/ML
INJECTION, SOLUTION INTRAVENOUS ONCE
Status: COMPLETED | OUTPATIENT
Start: 2017-08-23 | End: 2017-08-23

## 2017-08-23 RX ORDER — OXYCODONE HYDROCHLORIDE AND ACETAMINOPHEN 5; 325 MG/1; MG/1
1 TABLET ORAL EVERY 4 HOURS PRN
Status: DISCONTINUED | OUTPATIENT
Start: 2017-08-23 | End: 2017-08-23 | Stop reason: HOSPADM

## 2017-08-23 RX ORDER — SODIUM CHLORIDE 0.9 % (FLUSH) 0.9 %
10 SYRINGE (ML) INJECTION PRN
Status: DISCONTINUED | OUTPATIENT
Start: 2017-08-23 | End: 2017-08-23 | Stop reason: HOSPADM

## 2017-08-23 RX ORDER — SODIUM CHLORIDE 0.9 % (FLUSH) 0.9 %
10 SYRINGE (ML) INJECTION PRN
Status: DISCONTINUED | OUTPATIENT
Start: 2017-08-23 | End: 2017-08-23

## 2017-08-23 RX ADMIN — Medication 10 ML: at 11:15

## 2017-08-23 RX ADMIN — PNEUMOCOCCAL 13-VALENT CONJUGATE VACCINE 0.5 ML: 2.2; 2.2; 2.2; 2.2; 2.2; 4.4; 2.2; 2.2; 2.2; 2.2; 2.2; 2.2; 2.2 INJECTION, SUSPENSION INTRAMUSCULAR at 13:34

## 2017-08-23 RX ADMIN — SODIUM CHLORIDE, PRESERVATIVE FREE 10 ML: 5 INJECTION INTRAVENOUS at 11:25

## 2017-08-23 RX ADMIN — SODIUM CHLORIDE: 9 INJECTION, SOLUTION INTRAVENOUS at 11:00

## 2017-08-23 RX ADMIN — OXYCODONE HYDROCHLORIDE AND ACETAMINOPHEN 2 TABLET: 5; 325 TABLET ORAL at 17:23

## 2017-08-23 RX ADMIN — AMINOPHYLLINE 100 MG: 25 INJECTION, SOLUTION INTRAVENOUS at 11:27

## 2017-08-23 RX ADMIN — TETRAKIS(2-METHOXYISOBUTYLISOCYANIDE)COPPER(I) TETRAFLUOROBORATE 15.5 MILLICURIE: 1 INJECTION, POWDER, LYOPHILIZED, FOR SOLUTION INTRAVENOUS at 14:22

## 2017-08-23 RX ADMIN — ONDANSETRON 4 MG: 2 INJECTION, SOLUTION INTRAMUSCULAR; INTRAVENOUS at 10:13

## 2017-08-23 RX ADMIN — OXYCODONE HYDROCHLORIDE AND ACETAMINOPHEN 1 TABLET: 5; 325 TABLET ORAL at 10:50

## 2017-08-23 RX ADMIN — OXYCODONE HYDROCHLORIDE AND ACETAMINOPHEN 2 TABLET: 5; 325 TABLET ORAL at 10:13

## 2017-08-23 RX ADMIN — ONDANSETRON 4 MG: 2 INJECTION, SOLUTION INTRAMUSCULAR; INTRAVENOUS at 18:03

## 2017-08-23 RX ADMIN — SODIUM CHLORIDE, PRESERVATIVE FREE 10 ML: 5 INJECTION INTRAVENOUS at 14:22

## 2017-08-23 RX ADMIN — Medication 10 ML: at 11:24

## 2017-08-23 RX ADMIN — TETRAKIS(2-METHOXYISOBUTYLISOCYANIDE)COPPER(I) TETRAFLUOROBORATE 44.5 MILLICURIE: 1 INJECTION, POWDER, LYOPHILIZED, FOR SOLUTION INTRAVENOUS at 11:25

## 2017-08-23 RX ADMIN — REGADENOSON 0.4 MG: 0.08 INJECTION, SOLUTION INTRAVENOUS at 11:24

## 2017-08-23 ASSESSMENT — PAIN DESCRIPTION - DESCRIPTORS: DESCRIPTORS: CONSTANT;ACHING

## 2017-08-23 ASSESSMENT — PAIN DESCRIPTION - LOCATION
LOCATION: CHEST

## 2017-08-23 ASSESSMENT — PAIN DESCRIPTION - FREQUENCY
FREQUENCY: CONTINUOUS
FREQUENCY: CONTINUOUS

## 2017-08-23 ASSESSMENT — PAIN SCALES - GENERAL
PAINLEVEL_OUTOF10: 6
PAINLEVEL_OUTOF10: 8
PAINLEVEL_OUTOF10: 5
PAINLEVEL_OUTOF10: 6
PAINLEVEL_OUTOF10: 6
PAINLEVEL_OUTOF10: 5
PAINLEVEL_OUTOF10: 3

## 2017-08-23 ASSESSMENT — PAIN DESCRIPTION - ORIENTATION
ORIENTATION: MID

## 2017-08-24 ENCOUNTER — APPOINTMENT (OUTPATIENT)
Dept: GENERAL RADIOLOGY | Age: 67
End: 2017-08-24
Payer: MEDICARE

## 2017-08-24 ENCOUNTER — HOSPITAL ENCOUNTER (OUTPATIENT)
Age: 67
Setting detail: OBSERVATION
Discharge: HOME | End: 2017-08-25
Attending: EMERGENCY MEDICINE | Admitting: EMERGENCY MEDICINE
Payer: MEDICARE

## 2017-08-24 DIAGNOSIS — R11.2 NON-INTRACTABLE VOMITING WITH NAUSEA, UNSPECIFIED VOMITING TYPE: ICD-10-CM

## 2017-08-24 DIAGNOSIS — K44.9 HIATAL HERNIA: ICD-10-CM

## 2017-08-24 DIAGNOSIS — J02.9 PHARYNGITIS, UNSPECIFIED ETIOLOGY: ICD-10-CM

## 2017-08-24 DIAGNOSIS — R07.89 OTHER CHEST PAIN: Primary | ICD-10-CM

## 2017-08-24 LAB
ABSOLUTE EOS #: 0.1 K/UL (ref 0–0.4)
ABSOLUTE LYMPH #: 0.7 K/UL (ref 1–4.8)
ABSOLUTE MONO #: 0.8 K/UL (ref 0.1–1.2)
ANION GAP SERPL CALCULATED.3IONS-SCNC: 19 MMOL/L (ref 9–17)
BASOPHILS # BLD: 0 %
BASOPHILS ABSOLUTE: 0 K/UL (ref 0–0.2)
BUN BLDV-MCNC: 11 MG/DL (ref 8–23)
BUN/CREAT BLD: ABNORMAL (ref 9–20)
CALCIUM SERPL-MCNC: 9 MG/DL (ref 8.6–10.4)
CHLORIDE BLD-SCNC: 101 MMOL/L (ref 98–107)
CO2: 23 MMOL/L (ref 20–31)
CREAT SERPL-MCNC: 0.89 MG/DL (ref 0.7–1.2)
DIFFERENTIAL TYPE: ABNORMAL
EOSINOPHILS RELATIVE PERCENT: 3 %
GFR AFRICAN AMERICAN: >60 ML/MIN
GFR NON-AFRICAN AMERICAN: >60 ML/MIN
GFR SERPL CREATININE-BSD FRML MDRD: ABNORMAL ML/MIN/{1.73_M2}
GFR SERPL CREATININE-BSD FRML MDRD: ABNORMAL ML/MIN/{1.73_M2}
GLUCOSE BLD-MCNC: 97 MG/DL (ref 70–99)
HCT VFR BLD CALC: 41.3 % (ref 41–53)
HEMOGLOBIN: 13.6 G/DL (ref 13.5–17.5)
LYMPHOCYTES # BLD: 14 %
MCH RBC QN AUTO: 28.2 PG (ref 26–34)
MCHC RBC AUTO-ENTMCNC: 33 G/DL (ref 31–37)
MCV RBC AUTO: 85.5 FL (ref 80–100)
MONOCYTES # BLD: 17 %
PDW BLD-RTO: 15.4 % (ref 12.5–15.4)
PLATELET # BLD: 246 K/UL (ref 140–450)
PLATELET ESTIMATE: ABNORMAL
PMV BLD AUTO: 8.4 FL (ref 6–12)
POC TROPONIN I: 0 NG/ML (ref 0–0.1)
POC TROPONIN INTERP: NORMAL
POTASSIUM SERPL-SCNC: 5.1 MMOL/L (ref 3.7–5.3)
RBC # BLD: 4.83 M/UL (ref 4.5–5.9)
RBC # BLD: ABNORMAL 10*6/UL
SEG NEUTROPHILS: 66 %
SEGMENTED NEUTROPHILS ABSOLUTE COUNT: 3.3 K/UL (ref 1.8–7.7)
SODIUM BLD-SCNC: 143 MMOL/L (ref 135–144)
TROPONIN INTERP: NORMAL
TROPONIN T: <0.03 NG/ML
WBC # BLD: 5 K/UL (ref 3.5–11)
WBC # BLD: ABNORMAL 10*3/UL

## 2017-08-24 PROCEDURE — 71020 XR CHEST STANDARD TWO VW: CPT

## 2017-08-24 PROCEDURE — 36415 COLL VENOUS BLD VENIPUNCTURE: CPT

## 2017-08-24 PROCEDURE — 96376 TX/PRO/DX INJ SAME DRUG ADON: CPT

## 2017-08-24 PROCEDURE — 96374 THER/PROPH/DIAG INJ IV PUSH: CPT

## 2017-08-24 PROCEDURE — 2580000003 HC RX 258: Performed by: EMERGENCY MEDICINE

## 2017-08-24 PROCEDURE — 6370000000 HC RX 637 (ALT 250 FOR IP): Performed by: EMERGENCY MEDICINE

## 2017-08-24 PROCEDURE — 80048 BASIC METABOLIC PNL TOTAL CA: CPT

## 2017-08-24 PROCEDURE — 96375 TX/PRO/DX INJ NEW DRUG ADDON: CPT

## 2017-08-24 PROCEDURE — 93005 ELECTROCARDIOGRAM TRACING: CPT

## 2017-08-24 PROCEDURE — 99285 EMERGENCY DEPT VISIT HI MDM: CPT

## 2017-08-24 PROCEDURE — 6360000002 HC RX W HCPCS: Performed by: EMERGENCY MEDICINE

## 2017-08-24 PROCEDURE — 6360000002 HC RX W HCPCS: Performed by: STUDENT IN AN ORGANIZED HEALTH CARE EDUCATION/TRAINING PROGRAM

## 2017-08-24 PROCEDURE — 84484 ASSAY OF TROPONIN QUANT: CPT

## 2017-08-24 PROCEDURE — 94664 DEMO&/EVAL PT USE INHALER: CPT

## 2017-08-24 PROCEDURE — G0378 HOSPITAL OBSERVATION PER HR: HCPCS

## 2017-08-24 PROCEDURE — 85025 COMPLETE CBC W/AUTO DIFF WBC: CPT

## 2017-08-24 RX ORDER — FENTANYL CITRATE 50 UG/ML
50 INJECTION, SOLUTION INTRAMUSCULAR; INTRAVENOUS
Status: DISCONTINUED | OUTPATIENT
Start: 2017-08-24 | End: 2017-08-24

## 2017-08-24 RX ORDER — FENTANYL CITRATE 50 UG/ML
25 INJECTION, SOLUTION INTRAMUSCULAR; INTRAVENOUS
Status: DISCONTINUED | OUTPATIENT
Start: 2017-08-24 | End: 2017-08-25 | Stop reason: HOSPADM

## 2017-08-24 RX ORDER — FENTANYL CITRATE 50 UG/ML
25 INJECTION, SOLUTION INTRAMUSCULAR; INTRAVENOUS
Status: DISCONTINUED | OUTPATIENT
Start: 2017-08-24 | End: 2017-08-24

## 2017-08-24 RX ORDER — ONDANSETRON 2 MG/ML
4 INJECTION INTRAMUSCULAR; INTRAVENOUS EVERY 8 HOURS PRN
Status: DISCONTINUED | OUTPATIENT
Start: 2017-08-24 | End: 2017-08-25 | Stop reason: HOSPADM

## 2017-08-24 RX ORDER — PANTOPRAZOLE SODIUM 40 MG/1
40 TABLET, DELAYED RELEASE ORAL
Status: DISCONTINUED | OUTPATIENT
Start: 2017-08-25 | End: 2017-08-25 | Stop reason: HOSPADM

## 2017-08-24 RX ORDER — FENTANYL CITRATE 50 UG/ML
50 INJECTION, SOLUTION INTRAMUSCULAR; INTRAVENOUS ONCE
Status: COMPLETED | OUTPATIENT
Start: 2017-08-24 | End: 2017-08-24

## 2017-08-24 RX ORDER — PROMETHAZINE HYDROCHLORIDE 25 MG/ML
12.5 INJECTION, SOLUTION INTRAMUSCULAR; INTRAVENOUS EVERY 6 HOURS PRN
Status: DISCONTINUED | OUTPATIENT
Start: 2017-08-24 | End: 2017-08-25 | Stop reason: HOSPADM

## 2017-08-24 RX ORDER — SODIUM CHLORIDE 0.9 % (FLUSH) 0.9 %
10 SYRINGE (ML) INJECTION PRN
Status: DISCONTINUED | OUTPATIENT
Start: 2017-08-24 | End: 2017-08-25 | Stop reason: HOSPADM

## 2017-08-24 RX ORDER — ONDANSETRON 2 MG/ML
4 INJECTION INTRAMUSCULAR; INTRAVENOUS ONCE
Status: COMPLETED | OUTPATIENT
Start: 2017-08-24 | End: 2017-08-24

## 2017-08-24 RX ORDER — MAGNESIUM HYDROXIDE/ALUMINUM HYDROXICE/SIMETHICONE 120; 1200; 1200 MG/30ML; MG/30ML; MG/30ML
30 SUSPENSION ORAL
Status: COMPLETED | OUTPATIENT
Start: 2017-08-24 | End: 2017-08-24

## 2017-08-24 RX ORDER — FENTANYL CITRATE 50 UG/ML
50 INJECTION, SOLUTION INTRAMUSCULAR; INTRAVENOUS
Status: DISCONTINUED | OUTPATIENT
Start: 2017-08-24 | End: 2017-08-25 | Stop reason: HOSPADM

## 2017-08-24 RX ORDER — ONDANSETRON 2 MG/ML
4 INJECTION INTRAMUSCULAR; INTRAVENOUS ONCE
Status: DISCONTINUED | OUTPATIENT
Start: 2017-08-24 | End: 2017-08-24

## 2017-08-24 RX ORDER — PROMETHAZINE HYDROCHLORIDE 25 MG/ML
12.5 INJECTION, SOLUTION INTRAMUSCULAR; INTRAVENOUS ONCE
Status: DISCONTINUED | OUTPATIENT
Start: 2017-08-24 | End: 2017-08-25 | Stop reason: HOSPADM

## 2017-08-24 RX ORDER — SODIUM CHLORIDE 0.9 % (FLUSH) 0.9 %
10 SYRINGE (ML) INJECTION EVERY 12 HOURS SCHEDULED
Status: DISCONTINUED | OUTPATIENT
Start: 2017-08-24 | End: 2017-08-25 | Stop reason: HOSPADM

## 2017-08-24 RX ORDER — FENTANYL CITRATE 50 UG/ML
25 INJECTION, SOLUTION INTRAMUSCULAR; INTRAVENOUS ONCE
Status: COMPLETED | OUTPATIENT
Start: 2017-08-24 | End: 2017-08-24

## 2017-08-24 RX ORDER — 0.9 % SODIUM CHLORIDE 0.9 %
1000 INTRAVENOUS SOLUTION INTRAVENOUS ONCE
Status: COMPLETED | OUTPATIENT
Start: 2017-08-24 | End: 2017-08-24

## 2017-08-24 RX ORDER — ACETAMINOPHEN 325 MG/1
650 TABLET ORAL EVERY 4 HOURS PRN
Status: DISCONTINUED | OUTPATIENT
Start: 2017-08-24 | End: 2017-08-25 | Stop reason: HOSPADM

## 2017-08-24 RX ORDER — SODIUM CHLORIDE 9 MG/ML
INJECTION, SOLUTION INTRAVENOUS CONTINUOUS
Status: DISCONTINUED | OUTPATIENT
Start: 2017-08-24 | End: 2017-08-25 | Stop reason: HOSPADM

## 2017-08-24 RX ADMIN — PROMETHAZINE HYDROCHLORIDE 12.5 MG: 25 INJECTION INTRAMUSCULAR; INTRAVENOUS at 22:46

## 2017-08-24 RX ADMIN — ALUMINUM HYDROXIDE, MAGNESIUM HYDROXIDE, AND SIMETHICONE 30 ML: 200; 200; 20 SUSPENSION ORAL at 13:53

## 2017-08-24 RX ADMIN — SODIUM CHLORIDE: 9 INJECTION, SOLUTION INTRAVENOUS at 18:42

## 2017-08-24 RX ADMIN — ONDANSETRON 4 MG: 2 INJECTION, SOLUTION INTRAMUSCULAR; INTRAVENOUS at 15:31

## 2017-08-24 RX ADMIN — FENTANYL CITRATE 25 MCG: 50 INJECTION INTRAMUSCULAR; INTRAVENOUS at 14:20

## 2017-08-24 RX ADMIN — SODIUM CHLORIDE 1000 ML: 9 INJECTION, SOLUTION INTRAVENOUS at 14:19

## 2017-08-24 RX ADMIN — ONDANSETRON 4 MG: 2 INJECTION, SOLUTION INTRAMUSCULAR; INTRAVENOUS at 14:20

## 2017-08-24 RX ADMIN — FENTANYL CITRATE 50 MCG: 50 INJECTION INTRAMUSCULAR; INTRAVENOUS at 15:31

## 2017-08-24 ASSESSMENT — PAIN SCALES - GENERAL
PAINLEVEL_OUTOF10: 8
PAINLEVEL_OUTOF10: 8
PAINLEVEL_OUTOF10: 0
PAINLEVEL_OUTOF10: 7

## 2017-08-24 ASSESSMENT — PAIN DESCRIPTION - FREQUENCY: FREQUENCY: CONTINUOUS

## 2017-08-24 ASSESSMENT — ENCOUNTER SYMPTOMS
NAUSEA: 1
COUGH: 1
EYE REDNESS: 0
TROUBLE SWALLOWING: 0
EYE PAIN: 0
SORE THROAT: 1
SHORTNESS OF BREATH: 1
RHINORRHEA: 0
VOMITING: 1

## 2017-08-24 ASSESSMENT — PAIN DESCRIPTION - PAIN TYPE: TYPE: ACUTE PAIN;CHRONIC PAIN

## 2017-08-24 ASSESSMENT — PAIN DESCRIPTION - DESCRIPTORS: DESCRIPTORS: PRESSURE;CONSTANT

## 2017-08-24 ASSESSMENT — HEART SCORE: ECG: 1

## 2017-08-24 ASSESSMENT — PAIN DESCRIPTION - ONSET: ONSET: PROGRESSIVE

## 2017-08-24 ASSESSMENT — PAIN DESCRIPTION - LOCATION: LOCATION: THROAT;CHEST

## 2017-08-25 VITALS
SYSTOLIC BLOOD PRESSURE: 154 MMHG | OXYGEN SATURATION: 96 % | HEIGHT: 70 IN | HEART RATE: 69 BPM | TEMPERATURE: 99.3 F | BODY MASS INDEX: 30.06 KG/M2 | WEIGHT: 210 LBS | DIASTOLIC BLOOD PRESSURE: 70 MMHG | RESPIRATION RATE: 16 BRPM

## 2017-08-25 LAB
ABSOLUTE EOS #: 0.14 K/UL (ref 0–0.4)
ABSOLUTE LYMPH #: 1.19 K/UL (ref 1–4.8)
ABSOLUTE MONO #: 0.84 K/UL (ref 0.1–0.8)
ANION GAP SERPL CALCULATED.3IONS-SCNC: 13 MMOL/L (ref 9–17)
BASOPHILS # BLD: 0 %
BASOPHILS ABSOLUTE: 0 K/UL (ref 0–0.2)
BUN BLDV-MCNC: 8 MG/DL (ref 8–23)
BUN/CREAT BLD: ABNORMAL (ref 9–20)
CALCIUM SERPL-MCNC: 8.4 MG/DL (ref 8.6–10.4)
CHLORIDE BLD-SCNC: 101 MMOL/L (ref 98–107)
CO2: 23 MMOL/L (ref 20–31)
CREAT SERPL-MCNC: 0.92 MG/DL (ref 0.7–1.2)
DIFFERENTIAL TYPE: ABNORMAL
EKG ATRIAL RATE: 84 BPM
EKG P AXIS: 64 DEGREES
EKG P-R INTERVAL: 174 MS
EKG Q-T INTERVAL: 382 MS
EKG QRS DURATION: 90 MS
EKG QTC CALCULATION (BAZETT): 451 MS
EKG R AXIS: 17 DEGREES
EKG T AXIS: 81 DEGREES
EKG VENTRICULAR RATE: 84 BPM
EOSINOPHILS RELATIVE PERCENT: 4 %
GFR AFRICAN AMERICAN: >60 ML/MIN
GFR NON-AFRICAN AMERICAN: >60 ML/MIN
GFR SERPL CREATININE-BSD FRML MDRD: ABNORMAL ML/MIN/{1.73_M2}
GFR SERPL CREATININE-BSD FRML MDRD: ABNORMAL ML/MIN/{1.73_M2}
GLUCOSE BLD-MCNC: 91 MG/DL (ref 70–99)
HCT VFR BLD CALC: 36.5 % (ref 41–53)
HEMOGLOBIN: 12.3 G/DL (ref 13.5–17.5)
LYMPHOCYTES # BLD: 34 %
MCH RBC QN AUTO: 28.4 PG (ref 26–34)
MCHC RBC AUTO-ENTMCNC: 33.7 G/DL (ref 31–37)
MCV RBC AUTO: 84.4 FL (ref 80–100)
MONOCYTES # BLD: 24 %
MORPHOLOGY: ABNORMAL
MYELOCYTES ABSOLUTE COUNT: 0.04 K/UL
MYELOCYTES: 1 %
PDW BLD-RTO: 15.6 % (ref 12.5–15.4)
PLATELET # BLD: 278 K/UL (ref 140–450)
PLATELET ESTIMATE: ABNORMAL
PMV BLD AUTO: 8.4 FL (ref 6–12)
POTASSIUM SERPL-SCNC: 4 MMOL/L (ref 3.7–5.3)
RBC # BLD: 4.33 M/UL (ref 4.5–5.9)
RBC # BLD: ABNORMAL 10*6/UL
SEG NEUTROPHILS: 37 %
SEGMENTED NEUTROPHILS ABSOLUTE COUNT: 1.29 K/UL (ref 1.8–7.7)
SODIUM BLD-SCNC: 137 MMOL/L (ref 135–144)
WBC # BLD: 3.5 K/UL (ref 3.5–11)
WBC # BLD: ABNORMAL 10*3/UL

## 2017-08-25 PROCEDURE — 6370000000 HC RX 637 (ALT 250 FOR IP): Performed by: STUDENT IN AN ORGANIZED HEALTH CARE EDUCATION/TRAINING PROGRAM

## 2017-08-25 PROCEDURE — 6360000002 HC RX W HCPCS: Performed by: STUDENT IN AN ORGANIZED HEALTH CARE EDUCATION/TRAINING PROGRAM

## 2017-08-25 PROCEDURE — G0378 HOSPITAL OBSERVATION PER HR: HCPCS

## 2017-08-25 PROCEDURE — 96375 TX/PRO/DX INJ NEW DRUG ADDON: CPT

## 2017-08-25 PROCEDURE — 6360000002 HC RX W HCPCS: Performed by: EMERGENCY MEDICINE

## 2017-08-25 PROCEDURE — 6370000000 HC RX 637 (ALT 250 FOR IP): Performed by: EMERGENCY MEDICINE

## 2017-08-25 PROCEDURE — 80048 BASIC METABOLIC PNL TOTAL CA: CPT

## 2017-08-25 PROCEDURE — 36415 COLL VENOUS BLD VENIPUNCTURE: CPT

## 2017-08-25 PROCEDURE — 94660 CPAP INITIATION&MGMT: CPT

## 2017-08-25 PROCEDURE — 96376 TX/PRO/DX INJ SAME DRUG ADON: CPT

## 2017-08-25 PROCEDURE — 85025 COMPLETE CBC W/AUTO DIFF WBC: CPT

## 2017-08-25 RX ORDER — CLINDAMYCIN HYDROCHLORIDE 150 MG/1
600 CAPSULE ORAL EVERY 8 HOURS SCHEDULED
Status: DISCONTINUED | OUTPATIENT
Start: 2017-08-25 | End: 2017-08-25 | Stop reason: HOSPADM

## 2017-08-25 RX ORDER — METHYLPREDNISOLONE SODIUM SUCCINATE 125 MG/2ML
125 INJECTION, POWDER, LYOPHILIZED, FOR SOLUTION INTRAMUSCULAR; INTRAVENOUS ONCE
Status: COMPLETED | OUTPATIENT
Start: 2017-08-25 | End: 2017-08-25

## 2017-08-25 RX ORDER — CLINDAMYCIN HYDROCHLORIDE 150 MG/1
300 CAPSULE ORAL EVERY 8 HOURS SCHEDULED
Status: DISCONTINUED | OUTPATIENT
Start: 2017-08-25 | End: 2017-08-25

## 2017-08-25 RX ORDER — CLINDAMYCIN HYDROCHLORIDE 300 MG/1
600 CAPSULE ORAL EVERY 8 HOURS SCHEDULED
Qty: 20 CAPSULE | Refills: 0 | Status: SHIPPED | OUTPATIENT
Start: 2017-08-25 | End: 2017-09-04

## 2017-08-25 RX ADMIN — PROMETHAZINE HYDROCHLORIDE 12.5 MG: 25 INJECTION INTRAMUSCULAR; INTRAVENOUS at 10:33

## 2017-08-25 RX ADMIN — CLINDAMYCIN HYDROCHLORIDE 600 MG: 150 CAPSULE ORAL at 16:17

## 2017-08-25 RX ADMIN — METHYLPREDNISOLONE SODIUM SUCCINATE 125 MG: 125 INJECTION, POWDER, FOR SOLUTION INTRAMUSCULAR; INTRAVENOUS at 12:32

## 2017-08-25 RX ADMIN — FENTANYL CITRATE 50 MCG: 50 INJECTION INTRAMUSCULAR; INTRAVENOUS at 16:20

## 2017-08-25 RX ADMIN — PROMETHAZINE HYDROCHLORIDE 12.5 MG: 25 INJECTION INTRAMUSCULAR; INTRAVENOUS at 04:47

## 2017-08-25 RX ADMIN — ONDANSETRON 4 MG: 2 INJECTION, SOLUTION INTRAMUSCULAR; INTRAVENOUS at 03:01

## 2017-08-25 RX ADMIN — PANTOPRAZOLE SODIUM 40 MG: 40 TABLET, DELAYED RELEASE ORAL at 12:32

## 2017-08-25 ASSESSMENT — PAIN SCALES - GENERAL
PAINLEVEL_OUTOF10: 7
PAINLEVEL_OUTOF10: 0

## 2017-08-31 ENCOUNTER — CARE COORDINATION (OUTPATIENT)
Dept: CARE COORDINATION | Age: 67
End: 2017-08-31

## 2017-10-07 ENCOUNTER — HOSPITAL ENCOUNTER (OUTPATIENT)
Dept: MRI IMAGING | Age: 67
Discharge: HOME | End: 2017-10-07
Payer: MEDICARE

## 2017-10-07 DIAGNOSIS — G47.33 OBSTRUCTIVE SLEEP APNEA SYNDROME: ICD-10-CM

## 2017-10-07 DIAGNOSIS — D10.1: ICD-10-CM

## 2017-10-07 DIAGNOSIS — J35.3 HYPERTROPHY OF TONSILS WITH HYPERTROPHY OF ADENOIDS: ICD-10-CM

## 2017-10-07 DIAGNOSIS — J31.2 CHRONIC SORE THROAT: ICD-10-CM

## 2017-10-07 PROCEDURE — A9579 GAD-BASE MR CONTRAST NOS,1ML: HCPCS | Performed by: OTOLARYNGOLOGY

## 2017-10-07 PROCEDURE — 6360000004 HC RX CONTRAST MEDICATION: Performed by: OTOLARYNGOLOGY

## 2017-10-07 PROCEDURE — 70543 MRI ORBT/FAC/NCK W/O &W/DYE: CPT

## 2017-10-07 RX ORDER — SODIUM CHLORIDE 0.9 % (FLUSH) 0.9 %
10 SYRINGE (ML) INJECTION 2 TIMES DAILY
Status: DISCONTINUED | OUTPATIENT
Start: 2017-10-07 | End: 2017-10-10 | Stop reason: HOSPADM

## 2017-10-07 RX ADMIN — GADOPENTETATE DIMEGLUMINE 18 ML: 469.01 INJECTION INTRAVENOUS at 11:25

## 2017-11-15 RX ORDER — OMEPRAZOLE 40 MG/1
CAPSULE, DELAYED RELEASE ORAL
Qty: 90 CAPSULE | Refills: 4 | Status: ON HOLD | OUTPATIENT
Start: 2017-11-15 | End: 2019-04-22 | Stop reason: SDUPTHER

## 2018-05-29 ENCOUNTER — HOSPITAL ENCOUNTER (OUTPATIENT)
Age: 68
Setting detail: OUTPATIENT SURGERY
Discharge: HOME | End: 2018-05-29
Attending: INTERNAL MEDICINE | Admitting: INTERNAL MEDICINE
Payer: MEDICARE

## 2018-05-29 ENCOUNTER — ANESTHESIA (OUTPATIENT)
Dept: ENDOSCOPY | Age: 68
End: 2018-05-29
Payer: MEDICARE

## 2018-05-29 ENCOUNTER — ANESTHESIA EVENT (OUTPATIENT)
Dept: ENDOSCOPY | Age: 68
End: 2018-05-29
Payer: MEDICARE

## 2018-05-29 VITALS
SYSTOLIC BLOOD PRESSURE: 120 MMHG | OXYGEN SATURATION: 99 % | HEIGHT: 70 IN | TEMPERATURE: 98.2 F | HEART RATE: 87 BPM | RESPIRATION RATE: 19 BRPM | BODY MASS INDEX: 31.5 KG/M2 | DIASTOLIC BLOOD PRESSURE: 52 MMHG | WEIGHT: 220 LBS

## 2018-05-29 VITALS
OXYGEN SATURATION: 94 % | DIASTOLIC BLOOD PRESSURE: 62 MMHG | RESPIRATION RATE: 19 BRPM | SYSTOLIC BLOOD PRESSURE: 100 MMHG

## 2018-05-29 PROCEDURE — 3609012800 HC EGD DIAGNOSTIC ONLY: Performed by: INTERNAL MEDICINE

## 2018-05-29 PROCEDURE — 3609010600 HC COLONOSCOPY POLYPECTOMY SNARE/COLD BIOPSY: Performed by: INTERNAL MEDICINE

## 2018-05-29 PROCEDURE — 87206 SMEAR FLUORESCENT/ACID STAI: CPT

## 2018-05-29 PROCEDURE — 2500000003 HC RX 250 WO HCPCS

## 2018-05-29 PROCEDURE — 88305 TISSUE EXAM BY PATHOLOGIST: CPT

## 2018-05-29 PROCEDURE — 2580000003 HC RX 258: Performed by: INTERNAL MEDICINE

## 2018-05-29 PROCEDURE — 7100000011 HC PHASE II RECOVERY - ADDTL 15 MIN: Performed by: INTERNAL MEDICINE

## 2018-05-29 PROCEDURE — 3700000001 HC ADD 15 MINUTES (ANESTHESIA): Performed by: INTERNAL MEDICINE

## 2018-05-29 PROCEDURE — 7100000010 HC PHASE II RECOVERY - FIRST 15 MIN: Performed by: INTERNAL MEDICINE

## 2018-05-29 PROCEDURE — 3700000000 HC ANESTHESIA ATTENDED CARE: Performed by: INTERNAL MEDICINE

## 2018-05-29 PROCEDURE — 6360000002 HC RX W HCPCS

## 2018-05-29 PROCEDURE — 3609010300 HC COLONOSCOPY W/BIOPSY SINGLE/MULTIPLE: Performed by: INTERNAL MEDICINE

## 2018-05-29 RX ORDER — SODIUM CHLORIDE 9 MG/ML
INJECTION, SOLUTION INTRAVENOUS CONTINUOUS
Status: DISCONTINUED | OUTPATIENT
Start: 2018-05-29 | End: 2018-05-29 | Stop reason: HOSPADM

## 2018-05-29 RX ORDER — LIDOCAINE HYDROCHLORIDE 10 MG/ML
INJECTION, SOLUTION EPIDURAL; INFILTRATION; INTRACAUDAL; PERINEURAL PRN
Status: DISCONTINUED | OUTPATIENT
Start: 2018-05-29 | End: 2018-05-29 | Stop reason: SDUPTHER

## 2018-05-29 RX ORDER — PROPOFOL 10 MG/ML
INJECTION, EMULSION INTRAVENOUS PRN
Status: DISCONTINUED | OUTPATIENT
Start: 2018-05-29 | End: 2018-05-29 | Stop reason: SDUPTHER

## 2018-05-29 RX ADMIN — PROPOFOL 40 MG: 10 INJECTION, EMULSION INTRAVENOUS at 08:41

## 2018-05-29 RX ADMIN — PROPOFOL 20 MG: 10 INJECTION, EMULSION INTRAVENOUS at 08:36

## 2018-05-29 RX ADMIN — PROPOFOL 40 MG: 10 INJECTION, EMULSION INTRAVENOUS at 08:50

## 2018-05-29 RX ADMIN — PROPOFOL 40 MG: 10 INJECTION, EMULSION INTRAVENOUS at 08:58

## 2018-05-29 RX ADMIN — PROPOFOL 40 MG: 10 INJECTION, EMULSION INTRAVENOUS at 09:10

## 2018-05-29 RX ADMIN — PROPOFOL 30 MG: 10 INJECTION, EMULSION INTRAVENOUS at 08:38

## 2018-05-29 RX ADMIN — PROPOFOL 20 MG: 10 INJECTION, EMULSION INTRAVENOUS at 09:06

## 2018-05-29 RX ADMIN — PROPOFOL 20 MG: 10 INJECTION, EMULSION INTRAVENOUS at 08:54

## 2018-05-29 RX ADMIN — PROPOFOL 20 MG: 10 INJECTION, EMULSION INTRAVENOUS at 08:43

## 2018-05-29 RX ADMIN — SODIUM CHLORIDE: 9 INJECTION, SOLUTION INTRAVENOUS at 08:02

## 2018-05-29 RX ADMIN — PROPOFOL 40 MG: 10 INJECTION, EMULSION INTRAVENOUS at 09:04

## 2018-05-29 RX ADMIN — PROPOFOL 40 MG: 10 INJECTION, EMULSION INTRAVENOUS at 09:08

## 2018-05-29 RX ADMIN — PROPOFOL 40 MG: 10 INJECTION, EMULSION INTRAVENOUS at 09:14

## 2018-05-29 RX ADMIN — PROPOFOL 20 MG: 10 INJECTION, EMULSION INTRAVENOUS at 08:52

## 2018-05-29 RX ADMIN — PROPOFOL 20 MG: 10 INJECTION, EMULSION INTRAVENOUS at 08:47

## 2018-05-29 RX ADMIN — LIDOCAINE HYDROCHLORIDE 50 MG: 10 INJECTION, SOLUTION EPIDURAL; INFILTRATION; INTRACAUDAL; PERINEURAL at 08:33

## 2018-05-29 RX ADMIN — PROPOFOL 60 MG: 10 INJECTION, EMULSION INTRAVENOUS at 08:34

## 2018-05-29 RX ADMIN — PROPOFOL 40 MG: 10 INJECTION, EMULSION INTRAVENOUS at 09:01

## 2018-05-29 RX ADMIN — PROPOFOL 20 MG: 10 INJECTION, EMULSION INTRAVENOUS at 08:56

## 2018-05-29 RX ADMIN — PROPOFOL 50 MG: 10 INJECTION, EMULSION INTRAVENOUS at 08:45

## 2018-05-29 ASSESSMENT — PAIN SCALES - GENERAL
PAINLEVEL_OUTOF10: 0

## 2018-05-29 ASSESSMENT — LIFESTYLE VARIABLES: SMOKING_STATUS: 1

## 2018-05-29 ASSESSMENT — PAIN - FUNCTIONAL ASSESSMENT: PAIN_FUNCTIONAL_ASSESSMENT: 0-10

## 2018-05-30 LAB
DIRECT EXAM: ABNORMAL
Lab: ABNORMAL
SPECIMEN DESCRIPTION: ABNORMAL
STATUS: ABNORMAL
SURGICAL PATHOLOGY REPORT: NORMAL

## 2018-06-28 ENCOUNTER — ANESTHESIA EVENT (OUTPATIENT)
Dept: ENDOSCOPY | Age: 68
End: 2018-06-28
Payer: MEDICARE

## 2018-06-28 ENCOUNTER — ANESTHESIA (OUTPATIENT)
Dept: ENDOSCOPY | Age: 68
End: 2018-06-28
Payer: MEDICARE

## 2018-06-28 ENCOUNTER — HOSPITAL ENCOUNTER (OUTPATIENT)
Age: 68
Setting detail: OUTPATIENT SURGERY
Discharge: HOME | End: 2018-06-28
Attending: INTERNAL MEDICINE | Admitting: INTERNAL MEDICINE
Payer: MEDICARE

## 2018-06-28 VITALS
DIASTOLIC BLOOD PRESSURE: 59 MMHG | OXYGEN SATURATION: 96 % | RESPIRATION RATE: 15 BRPM | SYSTOLIC BLOOD PRESSURE: 102 MMHG

## 2018-06-28 VITALS
RESPIRATION RATE: 16 BRPM | TEMPERATURE: 98.2 F | BODY MASS INDEX: 32.21 KG/M2 | WEIGHT: 225 LBS | DIASTOLIC BLOOD PRESSURE: 71 MMHG | OXYGEN SATURATION: 100 % | HEIGHT: 70 IN | HEART RATE: 78 BPM | SYSTOLIC BLOOD PRESSURE: 124 MMHG

## 2018-06-28 PROCEDURE — 2500000003 HC RX 250 WO HCPCS: Performed by: NURSE ANESTHETIST, CERTIFIED REGISTERED

## 2018-06-28 PROCEDURE — 7100000011 HC PHASE II RECOVERY - ADDTL 15 MIN: Performed by: INTERNAL MEDICINE

## 2018-06-28 PROCEDURE — 3700000000 HC ANESTHESIA ATTENDED CARE: Performed by: INTERNAL MEDICINE

## 2018-06-28 PROCEDURE — 2580000003 HC RX 258: Performed by: NURSE ANESTHETIST, CERTIFIED REGISTERED

## 2018-06-28 PROCEDURE — C1769 GUIDE WIRE: HCPCS | Performed by: INTERNAL MEDICINE

## 2018-06-28 PROCEDURE — 7100000010 HC PHASE II RECOVERY - FIRST 15 MIN: Performed by: INTERNAL MEDICINE

## 2018-06-28 PROCEDURE — 6360000002 HC RX W HCPCS: Performed by: NURSE ANESTHETIST, CERTIFIED REGISTERED

## 2018-06-28 PROCEDURE — 2580000003 HC RX 258: Performed by: INTERNAL MEDICINE

## 2018-06-28 PROCEDURE — 3700000001 HC ADD 15 MINUTES (ANESTHESIA): Performed by: INTERNAL MEDICINE

## 2018-06-28 PROCEDURE — 3609012700 HC EGD DILATION SAVORY: Performed by: INTERNAL MEDICINE

## 2018-06-28 RX ORDER — SODIUM CHLORIDE 9 MG/ML
INJECTION, SOLUTION INTRAVENOUS CONTINUOUS PRN
Status: DISCONTINUED | OUTPATIENT
Start: 2018-06-28 | End: 2018-06-28 | Stop reason: SDUPTHER

## 2018-06-28 RX ORDER — SODIUM CHLORIDE 9 MG/ML
INJECTION, SOLUTION INTRAVENOUS CONTINUOUS
Status: DISCONTINUED | OUTPATIENT
Start: 2018-06-28 | End: 2018-06-28 | Stop reason: HOSPADM

## 2018-06-28 RX ORDER — GLYCOPYRROLATE 1 MG/5 ML
SYRINGE (ML) INTRAVENOUS PRN
Status: DISCONTINUED | OUTPATIENT
Start: 2018-06-28 | End: 2018-06-28 | Stop reason: SDUPTHER

## 2018-06-28 RX ORDER — PROPOFOL 10 MG/ML
INJECTION, EMULSION INTRAVENOUS PRN
Status: DISCONTINUED | OUTPATIENT
Start: 2018-06-28 | End: 2018-06-28 | Stop reason: SDUPTHER

## 2018-06-28 RX ADMIN — SODIUM CHLORIDE: 9 INJECTION, SOLUTION INTRAVENOUS at 09:51

## 2018-06-28 RX ADMIN — PROPOFOL 400 MG: 10 INJECTION, EMULSION INTRAVENOUS at 09:55

## 2018-06-28 RX ADMIN — SODIUM CHLORIDE: 9 INJECTION, SOLUTION INTRAVENOUS at 09:50

## 2018-06-28 RX ADMIN — Medication 0.2 MG: at 09:53

## 2018-06-28 ASSESSMENT — PAIN - FUNCTIONAL ASSESSMENT: PAIN_FUNCTIONAL_ASSESSMENT: 0-10

## 2018-06-28 ASSESSMENT — PAIN SCALES - GENERAL
PAINLEVEL_OUTOF10: 0
PAINLEVEL_OUTOF10: 0

## 2018-06-28 ASSESSMENT — LIFESTYLE VARIABLES: SMOKING_STATUS: 1

## 2018-06-28 ASSESSMENT — PAIN SCALES - WONG BAKER: WONGBAKER_NUMERICALRESPONSE: 0

## 2018-12-13 ENCOUNTER — ANESTHESIA (OUTPATIENT)
Dept: ENDOSCOPY | Age: 68
End: 2018-12-13
Payer: MEDICARE

## 2018-12-13 ENCOUNTER — ANESTHESIA EVENT (OUTPATIENT)
Dept: ENDOSCOPY | Age: 68
End: 2018-12-13
Payer: MEDICARE

## 2018-12-13 ENCOUNTER — HOSPITAL ENCOUNTER (OUTPATIENT)
Age: 68
Setting detail: OUTPATIENT SURGERY
Discharge: HOME | End: 2018-12-13
Attending: INTERNAL MEDICINE | Admitting: INTERNAL MEDICINE
Payer: MEDICARE

## 2018-12-13 VITALS
TEMPERATURE: 98.4 F | WEIGHT: 232 LBS | DIASTOLIC BLOOD PRESSURE: 72 MMHG | HEIGHT: 70 IN | BODY MASS INDEX: 33.21 KG/M2 | HEART RATE: 70 BPM | OXYGEN SATURATION: 97 % | RESPIRATION RATE: 18 BRPM | SYSTOLIC BLOOD PRESSURE: 134 MMHG

## 2018-12-13 VITALS
RESPIRATION RATE: 19 BRPM | SYSTOLIC BLOOD PRESSURE: 100 MMHG | DIASTOLIC BLOOD PRESSURE: 81 MMHG | OXYGEN SATURATION: 97 %

## 2018-12-13 PROCEDURE — 6360000002 HC RX W HCPCS: Performed by: NURSE ANESTHETIST, CERTIFIED REGISTERED

## 2018-12-13 PROCEDURE — 3700000000 HC ANESTHESIA ATTENDED CARE: Performed by: INTERNAL MEDICINE

## 2018-12-13 PROCEDURE — 7100000010 HC PHASE II RECOVERY - FIRST 15 MIN: Performed by: INTERNAL MEDICINE

## 2018-12-13 PROCEDURE — 7100000011 HC PHASE II RECOVERY - ADDTL 15 MIN: Performed by: INTERNAL MEDICINE

## 2018-12-13 PROCEDURE — 88305 TISSUE EXAM BY PATHOLOGIST: CPT

## 2018-12-13 PROCEDURE — 3609012400 HC EGD TRANSORAL BIOPSY SINGLE/MULTIPLE: Performed by: INTERNAL MEDICINE

## 2018-12-13 PROCEDURE — 3609012700 HC EGD DILATION SAVORY: Performed by: INTERNAL MEDICINE

## 2018-12-13 PROCEDURE — 2580000003 HC RX 258: Performed by: INTERNAL MEDICINE

## 2018-12-13 PROCEDURE — 88312 SPECIAL STAINS GROUP 1: CPT

## 2018-12-13 RX ORDER — PROPOFOL 10 MG/ML
INJECTION, EMULSION INTRAVENOUS PRN
Status: DISCONTINUED | OUTPATIENT
Start: 2018-12-13 | End: 2018-12-13 | Stop reason: SDUPTHER

## 2018-12-13 RX ORDER — SODIUM CHLORIDE 9 MG/ML
INJECTION, SOLUTION INTRAVENOUS CONTINUOUS
Status: DISCONTINUED | OUTPATIENT
Start: 2018-12-13 | End: 2018-12-13 | Stop reason: HOSPADM

## 2018-12-13 RX ADMIN — SODIUM CHLORIDE: 9 INJECTION, SOLUTION INTRAVENOUS at 07:04

## 2018-12-13 RX ADMIN — PROPOFOL 300 MG: 10 INJECTION, EMULSION INTRAVENOUS at 07:37

## 2018-12-13 ASSESSMENT — PAIN - FUNCTIONAL ASSESSMENT: PAIN_FUNCTIONAL_ASSESSMENT: 0-10

## 2018-12-13 ASSESSMENT — PAIN SCALES - GENERAL
PAINLEVEL_OUTOF10: 0
PAINLEVEL_OUTOF10: 0

## 2018-12-13 NOTE — H&P
N/A    Years of education: N/A     Occupational History    Not on file. Social History Main Topics    Smoking status: Current Every Day Smoker     Packs/day: 0.75     Years: 40.00     Types: Cigarettes    Smokeless tobacco: Never Used    Alcohol use No    Drug use: No    Sexual activity: Not on file     Other Topics Concern    Not on file     Social History Narrative    No narrative on file         Service:Yes, [employer] , weapons repair            Hobbies: Bowling  Best games 277     OBJECTIVE:   VITALS:  height is 5' 10\" (1.778 m) and weight is 232 lb (105.2 kg). His temperature is 96.4 °F (35.8 °C). His blood pressure is 129/68 and his pulse is 73. His respiration is 18 and oxygen saturation is 98%. CONSTITUTIONAL: Alert and oriented times 3, no acute distress and cooperative to examination. friendly and pleasant , stocky , muscular build     SKIN: rash No    HEENT: Head is normocephalic, atraumatic. EOMI, PERRLA    Oral air way :slightly narrow Yes    NECK: neck supple, no lymphadenopathy noted, trachea midline and straight       2+ carotid, no bruit    LUNGS: Chest expands equally bilaterally upon respiration, no accessory muscle used. Ausculation reveals no adventitious breath sounds. CARDIOVASCULAR: \"Heart sounds are normal.  Regular rate and rhythm without murmur,    ABDOMEN: Bowel sounds are present in all four quadrants      GENATALIA:Deferred. NEUROLOGIC: \"CN II-XII are grossly intact. EXTREMITIES: Pitting edema:  No,  Varicose veins: No     Dorsal pedal/posterior tibial pulses palpable: Yes         Strength:  Normal       Patient Active Problem List   Diagnosis    BPH (benign prostatic hyperplasia)    Esophageal stricture    S/P TURP               IMPRESSIONS:   1. GERD  2.  does not have any pertinent problems on file.     [facility] [place]  Electronically signed 12/13/2018 at 7:00 AM       Scheduled for:  EGD

## 2018-12-14 LAB — SURGICAL PATHOLOGY REPORT: NORMAL

## 2019-01-15 ENCOUNTER — ANESTHESIA (OUTPATIENT)
Dept: ENDOSCOPY | Age: 69
End: 2019-01-15
Payer: MEDICARE

## 2019-01-15 ENCOUNTER — ANESTHESIA EVENT (OUTPATIENT)
Dept: ENDOSCOPY | Age: 69
End: 2019-01-15
Payer: MEDICARE

## 2019-01-15 ENCOUNTER — HOSPITAL ENCOUNTER (OUTPATIENT)
Age: 69
Setting detail: OUTPATIENT SURGERY
Discharge: HOME | End: 2019-01-15
Attending: INTERNAL MEDICINE | Admitting: INTERNAL MEDICINE
Payer: MEDICARE

## 2019-01-15 VITALS
BODY MASS INDEX: 34.79 KG/M2 | TEMPERATURE: 97.6 F | HEART RATE: 71 BPM | DIASTOLIC BLOOD PRESSURE: 77 MMHG | WEIGHT: 243 LBS | RESPIRATION RATE: 24 BRPM | SYSTOLIC BLOOD PRESSURE: 148 MMHG | HEIGHT: 70 IN | OXYGEN SATURATION: 96 %

## 2019-01-15 VITALS
SYSTOLIC BLOOD PRESSURE: 109 MMHG | OXYGEN SATURATION: 96 % | DIASTOLIC BLOOD PRESSURE: 62 MMHG | RESPIRATION RATE: 17 BRPM

## 2019-01-15 PROCEDURE — 7100000011 HC PHASE II RECOVERY - ADDTL 15 MIN: Performed by: INTERNAL MEDICINE

## 2019-01-15 PROCEDURE — 7100000010 HC PHASE II RECOVERY - FIRST 15 MIN: Performed by: INTERNAL MEDICINE

## 2019-01-15 PROCEDURE — 3609012700 HC EGD DILATION SAVORY: Performed by: INTERNAL MEDICINE

## 2019-01-15 PROCEDURE — 3700000001 HC ADD 15 MINUTES (ANESTHESIA): Performed by: INTERNAL MEDICINE

## 2019-01-15 PROCEDURE — 3700000000 HC ANESTHESIA ATTENDED CARE: Performed by: INTERNAL MEDICINE

## 2019-01-15 PROCEDURE — 2580000003 HC RX 258: Performed by: INTERNAL MEDICINE

## 2019-01-15 PROCEDURE — C1769 GUIDE WIRE: HCPCS | Performed by: INTERNAL MEDICINE

## 2019-01-15 PROCEDURE — 6360000002 HC RX W HCPCS: Performed by: NURSE ANESTHETIST, CERTIFIED REGISTERED

## 2019-01-15 RX ORDER — SODIUM CHLORIDE 9 MG/ML
INJECTION, SOLUTION INTRAVENOUS CONTINUOUS
Status: DISCONTINUED | OUTPATIENT
Start: 2019-01-15 | End: 2019-01-15 | Stop reason: HOSPADM

## 2019-01-15 RX ORDER — PROPOFOL 10 MG/ML
INJECTION, EMULSION INTRAVENOUS PRN
Status: DISCONTINUED | OUTPATIENT
Start: 2019-01-15 | End: 2019-01-15 | Stop reason: SDUPTHER

## 2019-01-15 RX ADMIN — SODIUM CHLORIDE: 9 INJECTION, SOLUTION INTRAVENOUS at 09:51

## 2019-01-15 RX ADMIN — PROPOFOL 300 MG: 10 INJECTION, EMULSION INTRAVENOUS at 11:05

## 2019-01-15 ASSESSMENT — PAIN SCALES - GENERAL
PAINLEVEL_OUTOF10: 0

## 2019-04-04 ENCOUNTER — HOSPITAL ENCOUNTER (OUTPATIENT)
Age: 69
Setting detail: OBSERVATION
Discharge: HOME | End: 2019-04-05
Attending: EMERGENCY MEDICINE | Admitting: EMERGENCY MEDICINE
Payer: MEDICARE

## 2019-04-04 ENCOUNTER — APPOINTMENT (OUTPATIENT)
Dept: GENERAL RADIOLOGY | Age: 69
End: 2019-04-04
Payer: MEDICARE

## 2019-04-04 DIAGNOSIS — R10.84 GENERALIZED ABDOMINAL PAIN: ICD-10-CM

## 2019-04-04 DIAGNOSIS — R07.9 CHEST PAIN, UNSPECIFIED TYPE: Primary | ICD-10-CM

## 2019-04-04 LAB
-: ABNORMAL
-: NORMAL
ABSOLUTE EOS #: 0.09 K/UL (ref 0–0.44)
ABSOLUTE IMMATURE GRANULOCYTE: 0.03 K/UL (ref 0–0.3)
ABSOLUTE LYMPH #: 1.77 K/UL (ref 1.1–3.7)
ABSOLUTE MONO #: 0.61 K/UL (ref 0.1–1.2)
ALBUMIN SERPL-MCNC: 4.4 G/DL (ref 3.5–5.2)
ALBUMIN/GLOBULIN RATIO: 1.3 (ref 1–2.5)
ALP BLD-CCNC: 103 U/L (ref 40–129)
ALT SERPL-CCNC: 9 U/L (ref 5–41)
AMORPHOUS: ABNORMAL
ANION GAP SERPL CALCULATED.3IONS-SCNC: 12 MMOL/L (ref 9–17)
AST SERPL-CCNC: 14 U/L
BACTERIA: ABNORMAL
BASOPHILS # BLD: 0 % (ref 0–2)
BASOPHILS ABSOLUTE: 0.04 K/UL (ref 0–0.2)
BILIRUB SERPL-MCNC: 0.3 MG/DL (ref 0.3–1.2)
BILIRUBIN URINE: NEGATIVE
BNP INTERPRETATION: NORMAL
BUN BLDV-MCNC: 12 MG/DL (ref 8–23)
BUN/CREAT BLD: ABNORMAL (ref 9–20)
CALCIUM SERPL-MCNC: 9.2 MG/DL (ref 8.6–10.4)
CASTS UA: ABNORMAL /LPF (ref 0–2)
CHLORIDE BLD-SCNC: 107 MMOL/L (ref 98–107)
CO2: 22 MMOL/L (ref 20–31)
COLOR: YELLOW
COMMENT UA: ABNORMAL
CREAT SERPL-MCNC: 0.9 MG/DL (ref 0.7–1.2)
CRYSTALS, UA: ABNORMAL /HPF
DIFFERENTIAL TYPE: ABNORMAL
EOSINOPHILS RELATIVE PERCENT: 1 % (ref 1–4)
EPITHELIAL CELLS UA: ABNORMAL /HPF (ref 0–5)
GFR AFRICAN AMERICAN: >60 ML/MIN
GFR NON-AFRICAN AMERICAN: >60 ML/MIN
GFR SERPL CREATININE-BSD FRML MDRD: ABNORMAL ML/MIN/{1.73_M2}
GFR SERPL CREATININE-BSD FRML MDRD: ABNORMAL ML/MIN/{1.73_M2}
GLUCOSE BLD-MCNC: 116 MG/DL (ref 70–99)
GLUCOSE URINE: NEGATIVE
HCT VFR BLD CALC: 43 % (ref 40.7–50.3)
HEMOGLOBIN: 13.3 G/DL (ref 13–17)
IMMATURE GRANULOCYTES: 0 %
KETONES, URINE: NEGATIVE
LACTIC ACID, WHOLE BLOOD: 0.9 MMOL/L (ref 0.7–2.1)
LEUKOCYTE ESTERASE, URINE: NEGATIVE
LIPASE: 28 U/L (ref 13–60)
LYMPHOCYTES # BLD: 19 % (ref 24–43)
MCH RBC QN AUTO: 27.8 PG (ref 25.2–33.5)
MCHC RBC AUTO-ENTMCNC: 30.9 G/DL (ref 28.4–34.8)
MCV RBC AUTO: 89.8 FL (ref 82.6–102.9)
MONOCYTES # BLD: 7 % (ref 3–12)
MUCUS: ABNORMAL
NITRITE, URINE: NEGATIVE
NRBC AUTOMATED: 0 PER 100 WBC
OTHER OBSERVATIONS UA: ABNORMAL
PDW BLD-RTO: 15.7 % (ref 11.8–14.4)
PH UA: 5.5 (ref 5–8)
PLATELET # BLD: 257 K/UL (ref 138–453)
PLATELET ESTIMATE: ABNORMAL
PMV BLD AUTO: 10.2 FL (ref 8.1–13.5)
POTASSIUM SERPL-SCNC: 3.8 MMOL/L (ref 3.7–5.3)
PRO-BNP: 268 PG/ML
PROTEIN UA: ABNORMAL
RBC # BLD: 4.79 M/UL (ref 4.21–5.77)
RBC # BLD: ABNORMAL 10*6/UL
RBC UA: ABNORMAL /HPF (ref 0–2)
REASON FOR REJECTION: NORMAL
RENAL EPITHELIAL, UA: ABNORMAL /HPF
SEG NEUTROPHILS: 73 % (ref 36–65)
SEGMENTED NEUTROPHILS ABSOLUTE COUNT: 6.64 K/UL (ref 1.5–8.1)
SODIUM BLD-SCNC: 141 MMOL/L (ref 135–144)
SPECIFIC GRAVITY UA: 1.04 (ref 1–1.03)
TOTAL PROTEIN: 7.8 G/DL (ref 6.4–8.3)
TRICHOMONAS: ABNORMAL
TROPONIN INTERP: NORMAL
TROPONIN T: NORMAL NG/ML
TROPONIN, HIGH SENSITIVITY: 11 NG/L (ref 0–22)
TROPONIN, HIGH SENSITIVITY: 11 NG/L (ref 0–22)
TROPONIN, HIGH SENSITIVITY: 9 NG/L (ref 0–22)
TSH SERPL DL<=0.05 MIU/L-ACNC: 0.5 MIU/L (ref 0.3–5)
TURBIDITY: CLEAR
URINE HGB: ABNORMAL
UROBILINOGEN, URINE: NORMAL
WBC # BLD: 9.2 K/UL (ref 3.5–11.3)
WBC # BLD: ABNORMAL 10*3/UL
WBC UA: ABNORMAL /HPF (ref 0–5)
YEAST: ABNORMAL
ZZ NTE CLEAN UP: ORDERED TEST: NORMAL
ZZ NTE WITH NAME CLEAN UP: SPECIMEN SOURCE: NORMAL

## 2019-04-04 PROCEDURE — 81001 URINALYSIS AUTO W/SCOPE: CPT

## 2019-04-04 PROCEDURE — 2580000003 HC RX 258: Performed by: EMERGENCY MEDICINE

## 2019-04-04 PROCEDURE — 99285 EMERGENCY DEPT VISIT HI MDM: CPT

## 2019-04-04 PROCEDURE — 6370000000 HC RX 637 (ALT 250 FOR IP): Performed by: EMERGENCY MEDICINE

## 2019-04-04 PROCEDURE — 93005 ELECTROCARDIOGRAM TRACING: CPT

## 2019-04-04 PROCEDURE — 6360000002 HC RX W HCPCS: Performed by: EMERGENCY MEDICINE

## 2019-04-04 PROCEDURE — 36415 COLL VENOUS BLD VENIPUNCTURE: CPT

## 2019-04-04 PROCEDURE — 71046 X-RAY EXAM CHEST 2 VIEWS: CPT

## 2019-04-04 PROCEDURE — 80053 COMPREHEN METABOLIC PANEL: CPT

## 2019-04-04 PROCEDURE — 96374 THER/PROPH/DIAG INJ IV PUSH: CPT

## 2019-04-04 PROCEDURE — 6370000000 HC RX 637 (ALT 250 FOR IP): Performed by: STUDENT IN AN ORGANIZED HEALTH CARE EDUCATION/TRAINING PROGRAM

## 2019-04-04 PROCEDURE — G0378 HOSPITAL OBSERVATION PER HR: HCPCS

## 2019-04-04 PROCEDURE — 85025 COMPLETE CBC W/AUTO DIFF WBC: CPT

## 2019-04-04 PROCEDURE — 84443 ASSAY THYROID STIM HORMONE: CPT

## 2019-04-04 PROCEDURE — 83880 ASSAY OF NATRIURETIC PEPTIDE: CPT

## 2019-04-04 PROCEDURE — 96372 THER/PROPH/DIAG INJ SC/IM: CPT

## 2019-04-04 PROCEDURE — 83605 ASSAY OF LACTIC ACID: CPT

## 2019-04-04 PROCEDURE — 84484 ASSAY OF TROPONIN QUANT: CPT

## 2019-04-04 PROCEDURE — 83690 ASSAY OF LIPASE: CPT

## 2019-04-04 RX ORDER — ONDANSETRON 4 MG/1
4 TABLET, ORALLY DISINTEGRATING ORAL EVERY 8 HOURS PRN
Status: DISCONTINUED | OUTPATIENT
Start: 2019-04-04 | End: 2019-04-05 | Stop reason: HOSPADM

## 2019-04-04 RX ORDER — HYDROCODONE BITARTRATE AND ACETAMINOPHEN 5; 325 MG/1; MG/1
2 TABLET ORAL ONCE
Status: COMPLETED | OUTPATIENT
Start: 2019-04-04 | End: 2019-04-04

## 2019-04-04 RX ORDER — ASPIRIN 81 MG/1
324 TABLET, CHEWABLE ORAL ONCE
Status: COMPLETED | OUTPATIENT
Start: 2019-04-04 | End: 2019-04-04

## 2019-04-04 RX ORDER — PANTOPRAZOLE SODIUM 40 MG/1
40 TABLET, DELAYED RELEASE ORAL
Status: DISCONTINUED | OUTPATIENT
Start: 2019-04-05 | End: 2019-04-05 | Stop reason: HOSPADM

## 2019-04-04 RX ORDER — SODIUM CHLORIDE 0.9 % (FLUSH) 0.9 %
10 SYRINGE (ML) INJECTION EVERY 12 HOURS SCHEDULED
Status: DISCONTINUED | OUTPATIENT
Start: 2019-04-04 | End: 2019-04-05 | Stop reason: HOSPADM

## 2019-04-04 RX ORDER — ACETAMINOPHEN 325 MG/1
650 TABLET ORAL EVERY 4 HOURS PRN
Status: DISCONTINUED | OUTPATIENT
Start: 2019-04-04 | End: 2019-04-05 | Stop reason: HOSPADM

## 2019-04-04 RX ORDER — OXYCODONE HYDROCHLORIDE AND ACETAMINOPHEN 5; 325 MG/1; MG/1
2 TABLET ORAL EVERY 4 HOURS PRN
Status: DISCONTINUED | OUTPATIENT
Start: 2019-04-04 | End: 2019-04-05 | Stop reason: HOSPADM

## 2019-04-04 RX ORDER — ONDANSETRON 2 MG/ML
4 INJECTION INTRAMUSCULAR; INTRAVENOUS ONCE
Status: COMPLETED | OUTPATIENT
Start: 2019-04-04 | End: 2019-04-04

## 2019-04-04 RX ORDER — OXYCODONE HYDROCHLORIDE AND ACETAMINOPHEN 5; 325 MG/1; MG/1
1 TABLET ORAL EVERY 4 HOURS PRN
Status: DISCONTINUED | OUTPATIENT
Start: 2019-04-04 | End: 2019-04-05 | Stop reason: HOSPADM

## 2019-04-04 RX ORDER — 0.9 % SODIUM CHLORIDE 0.9 %
500 INTRAVENOUS SOLUTION INTRAVENOUS ONCE
Status: COMPLETED | OUTPATIENT
Start: 2019-04-04 | End: 2019-04-04

## 2019-04-04 RX ORDER — SODIUM CHLORIDE 0.9 % (FLUSH) 0.9 %
10 SYRINGE (ML) INJECTION PRN
Status: DISCONTINUED | OUTPATIENT
Start: 2019-04-04 | End: 2019-04-05 | Stop reason: HOSPADM

## 2019-04-04 RX ORDER — HYDROCODONE BITARTRATE AND ACETAMINOPHEN 5; 325 MG/1; MG/1
2 TABLET ORAL EVERY 4 HOURS PRN
Status: DISCONTINUED | OUTPATIENT
Start: 2019-04-04 | End: 2019-04-04

## 2019-04-04 RX ORDER — HYDROCODONE BITARTRATE AND ACETAMINOPHEN 5; 325 MG/1; MG/1
1 TABLET ORAL EVERY 4 HOURS PRN
Status: DISCONTINUED | OUTPATIENT
Start: 2019-04-04 | End: 2019-04-04

## 2019-04-04 RX ADMIN — ONDANSETRON 4 MG: 4 TABLET, ORALLY DISINTEGRATING ORAL at 15:24

## 2019-04-04 RX ADMIN — ASPIRIN 81 MG 324 MG: 81 TABLET ORAL at 10:17

## 2019-04-04 RX ADMIN — Medication 10 ML: at 20:21

## 2019-04-04 RX ADMIN — ONDANSETRON 4 MG: 2 INJECTION INTRAMUSCULAR; INTRAVENOUS at 10:19

## 2019-04-04 RX ADMIN — OXYCODONE HYDROCHLORIDE AND ACETAMINOPHEN 2 TABLET: 5; 325 TABLET ORAL at 15:55

## 2019-04-04 RX ADMIN — SODIUM CHLORIDE 500 ML: 9 INJECTION, SOLUTION INTRAVENOUS at 10:19

## 2019-04-04 RX ADMIN — HYDROCODONE BITARTRATE AND ACETAMINOPHEN 2 TABLET: 5; 325 TABLET ORAL at 12:06

## 2019-04-04 RX ADMIN — OXYCODONE HYDROCHLORIDE AND ACETAMINOPHEN 2 TABLET: 5; 325 TABLET ORAL at 22:34

## 2019-04-04 RX ADMIN — ENOXAPARIN SODIUM 40 MG: 40 INJECTION SUBCUTANEOUS at 20:21

## 2019-04-04 ASSESSMENT — PAIN DESCRIPTION - LOCATION: LOCATION: CHEST

## 2019-04-04 ASSESSMENT — PAIN SCALES - GENERAL
PAINLEVEL_OUTOF10: 4
PAINLEVEL_OUTOF10: 8
PAINLEVEL_OUTOF10: 4
PAINLEVEL_OUTOF10: 10
PAINLEVEL_OUTOF10: 7
PAINLEVEL_OUTOF10: 10
PAINLEVEL_OUTOF10: 2

## 2019-04-04 ASSESSMENT — ENCOUNTER SYMPTOMS
CONSTIPATION: 0
RHINORRHEA: 0
NAUSEA: 1
DIARRHEA: 0
COLOR CHANGE: 0
SHORTNESS OF BREATH: 1
BACK PAIN: 0
VOMITING: 0
CHEST TIGHTNESS: 1
ABDOMINAL PAIN: 0
ABDOMINAL DISTENTION: 0

## 2019-04-04 ASSESSMENT — PAIN DESCRIPTION - DESCRIPTORS: DESCRIPTORS: ACHING

## 2019-04-04 ASSESSMENT — PAIN DESCRIPTION - PAIN TYPE: TYPE: ACUTE PAIN

## 2019-04-04 ASSESSMENT — PAIN DESCRIPTION - ORIENTATION: ORIENTATION: MID

## 2019-04-04 ASSESSMENT — HEART SCORE: ECG: 1

## 2019-04-04 NOTE — ED PROVIDER NOTES
[facility]  ED  Emergency Department Encounter  EmergencyMedicine Resident     Pt Name:Cristiano Mliler  MRN: [id number]  [place] 1950  Date of evaluation: 4/4/19  PCP:  Guerrero Mittal DO    CHIEF COMPLAINT       Chief Complaint   Patient presents with    Emesis    Chest Pain    Shortness of Breath    Nausea       HISTORY OF PRESENT ILLNESS  (Location/Symptom, Timing/Onset, Context/Setting, Quality, Duration, Modifying Factors, Severity.)      Ana Bateman is a 71 y.o. male who presents with feeling fatigued for the last week, and nauseous, decreased appetite, no episodes of emesis. Patient also reports some occasional chest pain with some shortness of breath. No fevers or chills, no cough congestion runny nose, no dysuria frequency urgency. History of prostate cancer, on Lupron, has been taking this for the last 2 years, says that it makes him tired. No emesis but decreased appetite. No abdominal tenderness on examination, no diarrhea, says that he was constipated earlier but had a good bowel movement yesterday. No asymmetrical weakness. No radiations of chest pain, pain is 10 out of 10, no personal history of heart disease. No hemoptysis, asymmetrical leg swelling, recent immobilization. Reports nausea but denies any emesis, I have reviewed the nursing note that says that the patient had emesis. Patient denied emesis to me. PAST MEDICAL / SURGICAL / SOCIAL / FAMILY HISTORY      has a past medical history of Arthritis, BPH (benign prostatic hyperplasia), Candida esophagitis ([address] Esophageal hiatus hernia, GERD (gastroesophageal reflux disease), History of dysphagia, History of esophageal stricture, History of radiation therapy, Prostate cancer ([address] Sleep apnea, and Smoker. has a past surgical history that includes Endoscopy, colon, diagnostic; Colonoscopy; knee surgery (Right); Prostate biopsy (May 2015); Upper gastrointestinal endoscopy (01/30/2017);  Upper gastrointestinal endoscopy (02/13/2017); pr esophagogastroduodenoscopy transoral diagnostic (N/A, 2/13/2017); pr esophagogastroduodenoscopy transoral diagnostic (3/17/2017); pr egd balloon dilation esophagus <30 mm diam (N/A, 4/28/2017); pr egd balloon dilation esophagus <30 mm diam (N/A, 5/19/2017); Upper gastrointestinal endoscopy (N/A, 6/2/2017); Upper gastrointestinal endoscopy (N/A, 5/29/2018); Colonoscopy (5/29/2018); Colonoscopy (5/29/2018); Upper gastrointestinal endoscopy (N/A, 6/28/2018); Upper gastrointestinal endoscopy (N/A, 12/13/2018); Upper gastrointestinal endoscopy (12/13/2018); Total hip arthroplasty (Right); and Upper gastrointestinal endoscopy (N/A, 1/15/2019).     Social History     Socioeconomic History    Marital status:      Spouse name: Not on file    Number of children: Not on file    Years of education: Not on file    Highest education level: Not on file   Occupational History    Not on file   Social Needs    Financial resource strain: Not on file    Food insecurity:     Worry: Not on file     Inability: Not on file    Transportation needs:     Medical: Not on file     Non-medical: Not on file   Tobacco Use    Smoking status: Current Every Day Smoker     Packs/day: 0.75     Years: 40.00     Pack years: 30.00     Types: Cigarettes    Smokeless tobacco: Never Used   Substance and Sexual Activity    Alcohol use: No     Alcohol/week: 0.0 oz    Drug use: No    Sexual activity: Not on file   Lifestyle    Physical activity:     Days per week: Not on file     Minutes per session: Not on file    Stress: Not on file   Relationships    Social connections:     Talks on phone: Not on file     Gets together: Not on file     Attends Orthodoxy service: Not on file     Active member of club or organization: Not on file     Attends meetings of clubs or organizations: Not on file     Relationship status: Not on file    Intimate partner violence:     Fear of current or ex partner: Not k/uL    MPV 10.2 8.1 - 13.5 fL    NRBC Automated 0.0 0.0 per 100 WBC    Differential Type NOT REPORTED     Seg Neutrophils 73 (H) 36 - 65 %    Lymphocytes 19 (L) 24 - 43 %    Monocytes 7 3 - 12 %    Eosinophils % 1 1 - 4 %    Basophils 0 0 - 2 %    Immature Granulocytes 0 0 %    Segs Absolute 6.64 1.50 - 8.10 k/uL    Absolute Lymph # 1.77 1.10 - 3.70 k/uL    Absolute Mono # 0.61 0.10 - 1.20 k/uL    Absolute Eos # 0.09 0.00 - 0.44 k/uL    Basophils # 0.04 0.00 - 0.20 k/uL    Absolute Immature Granulocyte 0.03 0.00 - 0.30 k/uL    WBC Morphology NOT REPORTED     RBC Morphology ANISOCYTOSIS PRESENT     Platelet Estimate NOT REPORTED    Comprehensive Metabolic Panel   Result Value Ref Range    Glucose 116 (H) 70 - 99 mg/dL    BUN 12 8 - 23 mg/dL    CREATININE 0.90 0.70 - 1.20 mg/dL    Bun/Cre Ratio NOT REPORTED 9 - 20    Calcium 9.2 8.6 - 10.4 mg/dL    Sodium 141 135 - 144 mmol/L    Potassium 3.8 3.7 - 5.3 mmol/L    Chloride 107 98 - 107 mmol/L    CO2 22 20 - 31 mmol/L    Anion Gap 12 9 - 17 mmol/L    Alkaline Phosphatase 103 40 - 129 U/L    ALT 9 5 - 41 U/L    AST 14 <40 U/L    Total Bilirubin 0.30 0.3 - 1.2 mg/dL    Total Protein 7.8 6.4 - 8.3 g/dL    Alb 4.4 3.5 - 5.2 g/dL    Albumin/Globulin Ratio 1.3 1.0 - 2.5    GFR Non-African American >60 >60 mL/min    GFR African American >60 >60 mL/min    GFR Comment          GFR Staging NOT REPORTED    LIPASE   Result Value Ref Range    Lipase 28 13 - 60 U/L   Troponin   Result Value Ref Range    Troponin, High Sensitivity 11 0 - 22 ng/L    Troponin T NOT REPORTED <0.03 ng/mL    Troponin Interp NOT REPORTED    Brain Natriuretic Peptide   Result Value Ref Range    Pro- <300 pg/mL    BNP Interpretation Pro-BNP Reference Range:    Lactic Acid, Whole Blood   Result Value Ref Range    Lactic Acid, Whole Blood 0.9 0.7 - 2.1 mmol/L   TSH with Reflex   Result Value Ref Range    TSH 0.50 0.30 - 5.00 mIU/L   SPECIMEN REJECTION   Result Value Ref Range    Specimen Source . BLOOD Ordered Test TROPI     Reason for Rejection Unable to perform testing: Specimen hemolyzed. - NOT REPORTED        IMPRESSION: 80-year-old male comes into the emergency department secondary to a chief complaint of fatigue, nausea, decreased appetite and chest pain. I did do a cardiac workup including EKG, chest x-ray, troponins, BMP, we'll also check a TSH to see if any underlying cause of fatigue. We'll check a CBC and a BMP to make sure there is no anemia. RADIOLOGY:    Xr Chest Standard (2 Vw)    Result Date: 4/4/2019  EXAMINATION: TWO VIEWS OF THE CHEST 4/4/2019 10:22 am COMPARISON: 24 August 2017 HISTORY: ORDERING SYSTEM PROVIDED HISTORY: chest pain TECHNOLOGIST PROVIDED HISTORY: chest pain FINDINGS: Overlying cardiac monitoring electrodes are present. No cardiomegaly, vascular congestion effusion or pneumothorax is noted. Strand-like opacities are present at the lighting bases consistent with mild atelectasis. Osseous and mediastinal structures are age-appropriate. Mild atelectasis. Otherwise unremarkable study. EKG    EKG Interpretation    Interpreted by me    Rhythm: normal sinus   Rate: normal 72  Axis: normal  Ectopy: none  Conduction: normal  ST Segments: non specific  T Waves: Biphasic T waves in V2, V3, V4, V5, V6, these. T waves were previously inverted compared to 2017  Q Waves: none    Clinical Impression: Nonspecific EKG, T wave changes compared to previous EKG from 2017. All EKG's are interpreted by the Emergency Department Physician who either signs or Co-signs this chart in the absence of a cardiologist.    EMERGENCY DEPARTMENT COURSE:    Heart Score    Heart Score for chest pain patients  History:  Moderately Suspicious  ECG: Non-Specifc repolarization disturbance/LBTB/PM  Patient Age: > 65 years  *Risk factors for Atherosclerotic disease: Positive family History, Obesity, Hypertension  Risk Factors: > 3 Risk factors or history of atherosclerotic disease*  Troponin: < 1X normal limit  Heart Score Total: 6    Score 0 - 3 = 2.5%  MACE over next 6 wks = Discharge home  Score 4 - 6 = 20.3%  MACE over next 6 wks = Obs admit  Score 7 - 10 = 72.7%  MACE over next 6 wks = Early invasive Rx    Patient heart score was elevated, nonspecific EKG changes, first troponin was 11, patient will be admitted to the observation unit for evaluation by cardiology given the elevated heart score. Otherwise unremarkable blood work, normal vitals, plan to admit. Patient does continue to report abdominal pain but no tenderness on examination, and workup unremarkable. PROCEDURES:  None    CONSULTS:  None    CRITICAL CARE:  None    FINAL IMPRESSION      1. Chest pain, unspecified type    2.  Generalized abdominal pain          DISPOSITION / PLAN     DISPOSITION  Admit      PATIENT REFERRED TO:  Yuliet Valdes DO  [address] [address]  [phone]            DISCHARGE MEDICATIONS:  Current Discharge Medication List          Bk Carnes MD  Emergency Medicine Resident    (Please note that portions of thisnote were completed with a voice recognition program.  Efforts were made to edit the dictations but occasionally words are mis-transcribed.)       Bk Carnes MD  04/04/19 6203

## 2019-04-04 NOTE — ED PROVIDER NOTES
[facility]  ED  eMERGENCY dEPARTMENT eNCOUnter   Attending Attestation     Pt Name: Sergey Childress  MRN: [id number]  [place] 1950  Date of evaluation: 4/4/19       Sergey Childress is a 71 y.o. male who presents with Emesis; Chest Pain; Shortness of Breath; and Nausea      History: Pt presents with shortness of breath, nausea, feeling unwell for one week. Pt states he has been unable to leave the house. Exam: HRRR, lungs CTABL, abdomen soft and non tender. Pt is well appearing. EKG Interpretation    Interpreted by emergency department physician    Rhythm: normal sinus   Rate: normal  Axis: left  Ectopy: none  Conduction: normal  ST Segments: no acute change  T Waves: see below. Q Waves: none    Clinical Impression: non-specific EKG, biphasic t wave not suggestive of wellens in leads v2 and v3. T wave inverted in lead avr. Rick Quintanilla M.D. I performed a history and physical examination of the patient and discussed management with the resident. I reviewed the residents note and agree with the documented findings and plan of care. Any areas of disagreement are noted on the chart. I was personally present for the key portions of any procedures. I have documented in the chart those procedures where I was not present during the key portions. I have personally reviewed all images and agree with the resident's interpretation. I have reviewed the emergency nurses triage note. I agree with the chief complaint, past medical history, past surgical history, allergies, medications, social and family history as documented unless otherwise noted below. Documentation of the HPI, Physical Exam and Medical Decision Making performed by medical students or scribes is based on my personal performance of the HPI, PE and MDM.  For Phys Assistant/ Nurse Practitioner cases/documentation I have had a face to face evaluation of this patient and have completed at least one if not all key elements of the E/M (history, physical exam, and MDM). Additional findings are as noted. For APC cases I have personally evaluated and examined the patient in conjunction with the APC and agree with the treatment plan and disposition of the patient as recorded by the APC.     Rick Quintanilla MD  Attending Emergency  Physician        Sky Cadena MD  04/04/19 7888

## 2019-04-04 NOTE — CARE COORDINATION
Case Management Initial Discharge Plan  Tiffanie Martinez,             Met with:patient to discuss discharge plans. Information verified: address, contacts, phone number, , insurance Yes  PCP: Berny Zepeda DO  Date of last visit: \"couple years ago\"    Insurance Provider: [address] [place]    Discharge Planning    Living Arrangements:  Spouse/Significant Other   Support Systems:  Spouse/Significant Other, Family Members    Home has 2 stories  7 stairs to climb to get into front door, 1 flight stairs to climb to reach second floor  Location of bedroom/bathroom in home second floor     Patient able to perform ADL's:Independent    Current Services (outpatient & in home) none   DME equipment: cpap  DME provider: none     Pharmacy: [address] Medications:  No  Does patient want to participate in local refill/ meds to beds program?  No    Potential Assistance Needed:  N/A    Patient agreeable to home care: No  [place] of choice provided:  n/a    Prior SNF/Rehab Placement and Facility: no  Agreeable to SNF/Rehab: No  [place] of choice provided: n/a   Evaluation: no    Expected Discharge date:     Patient expects to be discharged to: Follow Up Appointment: Best Day/ Time:      Transportation provider: family   Transportation arrangements needed for discharge: No    Readmission Risk              Risk of Unplanned Readmission:        0             Does patient have a readmission risk score greater than 14?: No  If yes, follow-up appointment must be made within 7 days of discharge.      Discharge Plan: Home independently           Electronically signed by Silver Slaughter RN on 19 at 12:47 PM

## 2019-04-04 NOTE — ED NOTES
Pt placed on cardiac monitor, BP cuff, and pulse ox. Alarms set.       Ankita Cooper, JANE  04/04/19 1018

## 2019-04-04 NOTE — ED NOTES
Lab called and states troponin was hemolyzed and will need redrawn     Marisa Mon, JANE  04/04/19 7014

## 2019-04-04 NOTE — ED NOTES
Pt medicated as ordered. Pt provided urinal and updated on need to redraw labs. Pt is agreeable. Pt resting comfortably no distress noted. Daughter stepped outside to make a few phone calls will return.  Pt denies any further needs, will continue to monitor,      Alex Barrios RN  04/04/19 2628

## 2019-04-04 NOTE — ED NOTES
Pt requesting pain medication. Pt states his stomach is painful, denies nausea at this time. Pt lying on cot, alert, oriented, no distress noted. Daughter remains at bedside. Resident notified.       Edith Corrales RN  04/04/19 8031

## 2019-04-04 NOTE — ED NOTES
Pt to ed from home with daughter. Pt states for the past several days he has been nausea's, vomiting, chest pain, sob. Pt states he has not vomited in several days due to no appetite and limited po intake. Pt reports taking a stool softener yesterday and he had a bowel movement yesterday that was loose in nature. Pt rates pain 10/10. Pt reports chest pain is mid sternal, non radiating with sob, worse with exertion.       Chris Negrete RN  04/04/19 1100

## 2019-04-05 ENCOUNTER — APPOINTMENT (OUTPATIENT)
Dept: CARDIAC CATH/INVASIVE PROCEDURES | Age: 69
End: 2019-04-05
Payer: MEDICARE

## 2019-04-05 ENCOUNTER — APPOINTMENT (OUTPATIENT)
Dept: NUCLEAR MEDICINE | Age: 69
End: 2019-04-05
Payer: MEDICARE

## 2019-04-05 VITALS
SYSTOLIC BLOOD PRESSURE: 135 MMHG | OXYGEN SATURATION: 95 % | HEIGHT: 70 IN | TEMPERATURE: 97.3 F | WEIGHT: 230 LBS | BODY MASS INDEX: 32.93 KG/M2 | RESPIRATION RATE: 20 BRPM | DIASTOLIC BLOOD PRESSURE: 56 MMHG | HEART RATE: 58 BPM

## 2019-04-05 LAB
EKG ATRIAL RATE: 60 BPM
EKG ATRIAL RATE: 72 BPM
EKG P AXIS: 30 DEGREES
EKG P AXIS: 5 DEGREES
EKG P-R INTERVAL: 168 MS
EKG P-R INTERVAL: 186 MS
EKG Q-T INTERVAL: 400 MS
EKG Q-T INTERVAL: 438 MS
EKG QRS DURATION: 88 MS
EKG QRS DURATION: 90 MS
EKG QTC CALCULATION (BAZETT): 438 MS
EKG QTC CALCULATION (BAZETT): 438 MS
EKG R AXIS: -22 DEGREES
EKG R AXIS: -6 DEGREES
EKG T AXIS: 109 DEGREES
EKG T AXIS: 158 DEGREES
EKG VENTRICULAR RATE: 60 BPM
EKG VENTRICULAR RATE: 72 BPM

## 2019-04-05 PROCEDURE — 6360000002 HC RX W HCPCS

## 2019-04-05 PROCEDURE — 93458 L HRT ARTERY/VENTRICLE ANGIO: CPT | Performed by: INTERNAL MEDICINE

## 2019-04-05 PROCEDURE — 6360000004 HC RX CONTRAST MEDICATION

## 2019-04-05 PROCEDURE — 6370000000 HC RX 637 (ALT 250 FOR IP): Performed by: STUDENT IN AN ORGANIZED HEALTH CARE EDUCATION/TRAINING PROGRAM

## 2019-04-05 PROCEDURE — 2709999900 HC NON-CHARGEABLE SUPPLY

## 2019-04-05 PROCEDURE — 96372 THER/PROPH/DIAG INJ SC/IM: CPT

## 2019-04-05 PROCEDURE — 2580000003 HC RX 258: Performed by: EMERGENCY MEDICINE

## 2019-04-05 PROCEDURE — 93005 ELECTROCARDIOGRAM TRACING: CPT

## 2019-04-05 PROCEDURE — G0378 HOSPITAL OBSERVATION PER HR: HCPCS

## 2019-04-05 PROCEDURE — C1769 GUIDE WIRE: HCPCS

## 2019-04-05 PROCEDURE — 6360000002 HC RX W HCPCS: Performed by: EMERGENCY MEDICINE

## 2019-04-05 PROCEDURE — C1760 CLOSURE DEV, VASC: HCPCS

## 2019-04-05 PROCEDURE — 6370000000 HC RX 637 (ALT 250 FOR IP): Performed by: EMERGENCY MEDICINE

## 2019-04-05 PROCEDURE — 6360000002 HC RX W HCPCS: Performed by: STUDENT IN AN ORGANIZED HEALTH CARE EDUCATION/TRAINING PROGRAM

## 2019-04-05 PROCEDURE — C1894 INTRO/SHEATH, NON-LASER: HCPCS

## 2019-04-05 PROCEDURE — 96376 TX/PRO/DX INJ SAME DRUG ADON: CPT

## 2019-04-05 PROCEDURE — 2500000003 HC RX 250 WO HCPCS

## 2019-04-05 RX ORDER — METOPROLOL TARTRATE 5 MG/5ML
2.5 INJECTION INTRAVENOUS PRN
Status: DISCONTINUED | OUTPATIENT
Start: 2019-04-05 | End: 2019-04-05

## 2019-04-05 RX ORDER — NITROGLYCERIN 0.4 MG/1
0.4 TABLET SUBLINGUAL EVERY 5 MIN PRN
Status: DISCONTINUED | OUTPATIENT
Start: 2019-04-05 | End: 2019-04-05

## 2019-04-05 RX ORDER — SODIUM CHLORIDE 9 MG/ML
INJECTION, SOLUTION INTRAVENOUS ONCE
Status: DISCONTINUED | OUTPATIENT
Start: 2019-04-05 | End: 2019-04-05

## 2019-04-05 RX ORDER — SODIUM CHLORIDE 0.9 % (FLUSH) 0.9 %
10 SYRINGE (ML) INJECTION EVERY 12 HOURS SCHEDULED
Status: CANCELLED | OUTPATIENT
Start: 2019-04-05

## 2019-04-05 RX ORDER — ONDANSETRON 2 MG/ML
4 INJECTION INTRAMUSCULAR; INTRAVENOUS ONCE
Status: COMPLETED | OUTPATIENT
Start: 2019-04-05 | End: 2019-04-05

## 2019-04-05 RX ORDER — DOCUSATE SODIUM 100 MG/1
100 CAPSULE, LIQUID FILLED ORAL DAILY
Status: DISCONTINUED | OUTPATIENT
Start: 2019-04-05 | End: 2019-04-05 | Stop reason: HOSPADM

## 2019-04-05 RX ORDER — AMINOPHYLLINE DIHYDRATE 25 MG/ML
100 INJECTION, SOLUTION INTRAVENOUS
Status: DISCONTINUED | OUTPATIENT
Start: 2019-04-05 | End: 2019-04-05

## 2019-04-05 RX ORDER — AMLODIPINE BESYLATE 10 MG/1
10 TABLET ORAL DAILY
Qty: 30 TABLET | Refills: 0 | Status: ON HOLD | OUTPATIENT
Start: 2019-04-05 | End: 2021-11-18

## 2019-04-05 RX ORDER — ATORVASTATIN CALCIUM 40 MG/1
40 TABLET, FILM COATED ORAL NIGHTLY
Status: CANCELLED | OUTPATIENT
Start: 2019-04-05

## 2019-04-05 RX ORDER — SODIUM CHLORIDE 0.9 % (FLUSH) 0.9 %
10 SYRINGE (ML) INJECTION PRN
Status: CANCELLED | OUTPATIENT
Start: 2019-04-05

## 2019-04-05 RX ORDER — SODIUM CHLORIDE 0.9 % (FLUSH) 0.9 %
10 SYRINGE (ML) INJECTION PRN
Status: DISCONTINUED | OUTPATIENT
Start: 2019-04-05 | End: 2019-04-05

## 2019-04-05 RX ORDER — AMLODIPINE BESYLATE 10 MG/1
10 TABLET ORAL DAILY
Status: CANCELLED | OUTPATIENT
Start: 2019-04-05

## 2019-04-05 RX ADMIN — ONDANSETRON 4 MG: 2 INJECTION INTRAMUSCULAR; INTRAVENOUS at 11:51

## 2019-04-05 RX ADMIN — OXYCODONE HYDROCHLORIDE AND ACETAMINOPHEN 1 TABLET: 5; 325 TABLET ORAL at 19:13

## 2019-04-05 RX ADMIN — ENOXAPARIN SODIUM 40 MG: 40 INJECTION SUBCUTANEOUS at 08:46

## 2019-04-05 RX ADMIN — ONDANSETRON 4 MG: 4 TABLET, ORALLY DISINTEGRATING ORAL at 06:48

## 2019-04-05 RX ADMIN — PANTOPRAZOLE SODIUM 40 MG: 40 TABLET, DELAYED RELEASE ORAL at 08:43

## 2019-04-05 RX ADMIN — DOCUSATE SODIUM 100 MG: 100 CAPSULE, LIQUID FILLED ORAL at 09:16

## 2019-04-05 RX ADMIN — ONDANSETRON 4 MG: 4 TABLET, ORALLY DISINTEGRATING ORAL at 17:44

## 2019-04-05 RX ADMIN — Medication 10 ML: at 08:44

## 2019-04-05 RX ADMIN — OXYCODONE HYDROCHLORIDE AND ACETAMINOPHEN 1 TABLET: 5; 325 TABLET ORAL at 08:44

## 2019-04-05 ASSESSMENT — PAIN SCALES - GENERAL
PAINLEVEL_OUTOF10: 5
PAINLEVEL_OUTOF10: 5

## 2019-04-05 NOTE — H&P
[facility]  CDU / OBSERVATION eNCOUnter  Resident Note     Pt Name: Osei Winchester  MRN: [id number]  [place] 1950  Date of evaluation: 4/5/19  Patient's PCP is :  Melva Whitney DO    CHIEF COMPLAINT       Chief Complaint   Patient presents with    Emesis    Chest Pain    Shortness of Breath    Nausea         HISTORY OF PRESENT ILLNESS    Osei Winchester is a 71 y.o. male who presents with chest pain shortness of breath. Patient presented to the emergency department yesterday due to acute onset generalized fatigue, chest pain, nausea, and shortness of breath for the past week. Patient notes he has a history of prostate cancer and is currently on Lupron which she has been taking for the past 2 years. Workup in the emergency department was unremarkable except for nonspecific EKG, patient admitted for cardiology consultation. Location/Symptom: Midsternal chest pain  Timing/Onset: Acute  Provocation: None  Quality: Sharp  Radiation: None  Severity: 10/10  Timing/Duration: Intermittent    Modifying Factors: none    REVIEW OF SYSTEMS       General ROS - No fevers, No malaise   Ophthalmic ROS - No discharge, No changes in vision  ENT ROS -  No sore throat, No rhinorrhea,   Respiratory ROS - no shortness of breath, no cough, no  wheezing  Cardiovascular ROS - +chest pain, no dyspnea on exertion  Gastrointestinal ROS - +abdominal pain, +nausea no vomiting, no change in bowel habits, no black or bloody stools  Genito-Urinary ROS - No dysuria, trouble voiding, or hematuria  Musculoskeletal ROS - No myalgias, No arthalgias  Neurological ROS - No headache, no dizziness/lightheadedness, No focal weakness, no loss of sensation  Dermatological ROS - No lesions, No rash     (PQRS) Advance directives on face sheet per hospital policy.  No change unless specifically mentioned in chart    PAST MEDICAL HISTORY    has a past medical history of Arthritis, BPH (benign prostatic hyperplasia), Candida esophagitis ([facility] [address] [address] Esophageal hiatus hernia, GERD (gastroesophageal reflux disease), History of dysphagia, History of esophageal stricture, History of radiation therapy, Prostate cancer ([facility] [address] Sleep apnea, and Smoker. I have reviewed the past medical history with the patient and it is not pertinent to this complaint. SURGICAL HISTORY      has a past surgical history that includes Endoscopy, colon, diagnostic; Colonoscopy; knee surgery (Right); Prostate biopsy (May 2015); Upper gastrointestinal endoscopy (2017); Upper gastrointestinal endoscopy (2017); pr esophagogastroduodenoscopy transoral diagnostic (N/A, 2017); pr esophagogastroduodenoscopy transoral diagnostic (3/17/2017); pr egd balloon dilation esophagus <30 mm diam (N/A, 2017); pr egd balloon dilation esophagus <30 mm diam (N/A, 2017); Upper gastrointestinal endoscopy (N/A, 2017); Upper gastrointestinal endoscopy (N/A, 2018); Colonoscopy (2018); Colonoscopy (2018); Upper gastrointestinal endoscopy (N/A, 2018); Upper gastrointestinal endoscopy (N/A, 2018); Upper gastrointestinal endoscopy (2018); Total hip arthroplasty (Right); and Upper gastrointestinal endoscopy (N/A, 1/15/2019). I have reviewed and agree with Surgical History entered    CURRENT MEDICATIONS       pantoprazole (PROTONIX) tablet 40 mg QAM AC   sodium chloride flush 0.9 % injection 10 mL 2 times per day   sodium chloride flush 0.9 % injection 10 mL PRN   acetaminophen (TYLENOL) tablet 650 mg Q4H PRN   enoxaparin (LOVENOX) injection 40 mg Daily   ondansetron (ZOFRAN-ODT) disintegrating tablet 4 mg Q8H PRN   oxyCODONE-acetaminophen (PERCOCET) 5-325 MG per tablet 1 tablet Q4H PRN   Or    oxyCODONE-acetaminophen (PERCOCET) 5-325 MG per tablet 2 tablet Q4H PRN       All medication charted and reviewed. ALLERGIES     has No Known Allergies. FAMILY HISTORY     indicated that his mother is .  He indicated that his father is . He indicated that his sister is alive. family history includes High Blood Pressure in his father; Other in his mother. The patient denies any pertinent family history. I have reviewed and agree with the family history entered. I have reviewed the Family History and it is not significant to the case    SOCIAL HISTORY      reports that he has been smoking cigarettes. He has a 30.00 pack-year smoking history. He has never used smokeless tobacco. He reports that he does not drink alcohol or use drugs. I have reviewed and agree with all Social.  There are no concerns for substance abuse/use. PHYSICAL EXAM     INITIAL VITALS:  height is 5' 9.5\" (1.765 m) and weight is 230 lb (104.3 kg). His oral temperature is 98.4 °F (36.9 °C). His blood pressure is 157/67 (abnormal) and his pulse is 63. His respiration is 20 and oxygen saturation is 96%.       CONSTITUTIONAL: AOx4, no apparent distress, appears stated age    HEAD: normocephalic, atraumatic   EYES: PERRLA, EOMI    ENT: moist mucous membranes, uvula midline   NECK: supple, symmetric   BACK: symmetric   LUNGS: clear to auscultation bilaterally   CARDIOVASCULAR: regular rate and rhythm, no murmurs, rubs or gallops   ABDOMEN: soft, non-tender, non-distended with normal active bowel sounds   NEUROLOGIC:  MAEx4, no focal sensory or motor deficits   MUSCULOSKELETAL: no clubbing, cyanosis or edema   SKIN: no rash or wounds       DIFFERENTIAL DIAGNOSIS/MDM:     ACS/NSTEMI/STEMI/angina, arrhythmia, trauma, aortic dissection,  PE, PNA, pneumothroax, esophageal rupture, tamponade, Cocaine use pneumonia, pericarditis, GERD, MSK, Endocarditis, anxiety       DIAGNOSTIC RESULTS     EKG: All EKG's are interpreted by the Observation Physician who either signs or Co-signs this chart in the absence of a cardiologist.    EKG Interpretation    Interpreted by observation physician    Rhythm: normal sinus   Rate: normal  Axis: normal  Ectopy: none  Conduction: Suspicious (0 pts)   Moderately Suspicious (1 pt)   Highly Suspicious (2 pts)   EKG Interpretation   Normal (0 pts)   Non-Specific Repolarization Disturbance (1 pt)   Significant ST-Depression (2 pts)   Age of Patient (in years)   = 39 (0 pts)   46-64 (1 pt)   = 65 (2 pts)   Risk Factors   No Risk Factors (0 pts)   1-2 Risk Factors (1 pt)   = 3 Risk Factors (2 pts)   Risk Factors Include:   Hypercholesterolemia   Hypertension   Diabetes Mellitus   Cigarette smoking   Positive family history   Obesity   CAD   (SLE, CKDz, HIV, Cocaine abuse)   Troponin Levels   = Normal Limit (0 pts)   1-3 Times Normal Limit (1 pt)   > 3 Times Normal Limit (2 pts)  TOTAL:5    Percent Risk for Major Adverse Cardiac Event (MACE)  0-3 pts indicates low risk for MACE   2.5% (DISCHARGE)   4-7 pts indicates moderate risk for MACE  20.3% (OBS)  8-10 pts indicates high risk for MACE  72.7% (EARLY INVASIVE TX)    CDU IMPRESSION/PLAN      Jameson Chapa is a 71 y.o. male who presents with    1. Acute onset midsternal chest pain as well as nausea and left rib pain, etiology undetermined, improved with oral pain medications     · Cardiac workup in the emergency department was unremarkable except for a nonspecific ECG  · Patient also complaining of a mild cough and nausea, symptoms may be secondary to viral illness  · IP CONSULT TO CARDIOLOGY  · Further workup and evaluation   · Follow up recommendations     · Continue home meds, pain control   · Monitor vitals, labs, imaging         CONSULTS:    IP CONSULT TO CARDIOLOGY    PROCEDURES:  Not indicated       PATIENT REFERRED TO:    Nivia Mercado DO  [address] [place] [address]  [phone]            --  Apollo Duarte DO   Emergency Medicine Resident     This dictation was generated by voice recognition computer software. Although all attempts are made to edit the dictation for accuracy, there may be errors in the transcription that are not intended.

## 2019-04-05 NOTE — OP NOTE
[facility]        Date:   4/5/2019  Patient name:  Melany Sawyer  Date of admission:  4/4/2019 10:00 AM  MRN:   [id number]  YOB: 1950    CARDIAC CATHETERIZATION    Operators:  [id number]:GUERA Javier MD    CV Fellow:  Arian Saenz M.D. Procedure performed:       [x] Left Heart Catheterization. [] Graft Angiography. [x] Left Ventriculography. [] Right Heart Catheterization. [x] Coronary Angiography. [] Aortic Valve Studies. [] PCI:      [] Other:       Pre Procedure Conscious Sedation Data:    ASA Class:    [] I [x] II [] III [] IV     Mallampati Class:  [] I [x] II [] III [] IV      Indication:  [] STEMI      [] + Stress test  [] ACS      [x] + EKG Changes  [] Non Q MI       [x] Significant Risk Factors  [x] Recurrent Angina             [] Diabetes Mellitus    [] New LBBB      [x] Uncontrolled HTN. [] CHF / Low EF changes     [] Abnormal CTA / Ca Score  [] Other:     Procedure:  Access:  [x] Femoral artery  [] Radial  artery       [x] Right   [] Left    Procedure: After informed consent was obtained with explanation of the risks and benefits, patient was brought to the cath lab. The access area was prepped and draped in sterile fashion. 1% lidocaine was used for local block. The artery was cannulated with 6  Fr sheath with brisk arterial blood return. The side port was frequently flushed and aspirated with normal saline. Findings:    Left main: Normal    LAD: Mid diffuse 20% luminal irregularities. ; distal apical LAD is small vessel    LCX: Normal  OM1: Normal  Om2: small vessel, Normal    RCA:Normal      The LV gram was performed in the PIMENTEL 30 position. LVEF: 65%. LV Wall Motion: Normal          Conclusions:  1. Mild non-obstructive CAD  2. Overall preserved LV function    Recommendations:  1. Medical Therapy. Start BP control; Norvasc 10 mg daily. 2. Risk Factors Modification including smoking cessation.   3. Post Cath Protocol      History and Risk Factors [x] Hypertension     [] Family history of CAD  [] Hyperlipidemia     [] Cerebrovascular Disease   [] Prior MI       [] Peripheral Vascular disease   [] Prior PCI              [] Diabetes Mellitus    [] Left Main PCI. [] Currently on Dialysis. [] Prior CABG. [x] Currently smoker. [] Cardiac Arrest outside of healthcare facility. [] Yes    [x] No        Witnessed     [] Yes   [] No     Arrest after arrival of EMS  [] Yes   [] No     [] Cardiac Arrest at other Facility. [] Yes   [] No    Pre-Procedure Information. Heart Failure       [] Yes    [x] No        Class  [] I      [] II  [] III    [] IV. New Diagnosis    [] Yes  [] No    HF Type      [] Systolic   [] Diastolic          [] Unknown. Diagnostic Test:   EKG       [] Normal   [x] Abnormal    New antiarrhythmia medications:    [] Yes   [] No   New onset atrial fibrillation / Flutter     [] Yes   [] No   ECG Abnormalities:      [] V. Fib   [] Kayce V. Tach           [] NS V. T   [] New LBBB           [x] T. Inv  []  ST dev > 0.5 mm         [] PVC's freq  [] PVC's infrequent    Stress Test Performed:      [x] Yes    [] No     Type:     [] Stress Echo   [] Exercise Stress Test (no imaging)      [] Stress Nuclear  [] Stress Imaging     Results   [] Negative   [] Positive        [] Indeterminate  [x] Unavailable     If Positive/ Risk / Extent of Ischemia:       [] Low  [] Intermediate         [] High  [] Unavailable      Cardiac CTA Performed:     [] Yes    [x] No      Results   [] CAD   [] Non obstructive CAD      [] No CAD   [] Uncertain      [] Unknown   [] Structural Disease.      Pre Procedure Medications:   [] Yes    [x] No         [] ASA  [] Beta Blockers      [] Nitrate  [] Ca Channel Blockers      [] Ranolazine  [] Statin       [] Plavix/Others antiplatelets          Lord Kim MD  Fellow, [address]      I was present during entire procedure and performed all critical elements of the

## 2019-04-05 NOTE — FLOWSHEET NOTE
Pt returned to room via cath lab, this following stress lab recommendation for cardiac cath. Pt alert and oriented. Denies chest pain at this time. Complaint of nausea and continued constipation since Tuesday.

## 2019-04-05 NOTE — CONSULTS
[facility]    Consult / H&P               Today's Date: 4/5/2019  Patient Name: Imtiaz Nice  Date of admission: 4/4/2019 10:00 AM  Patient's age: 71 y.o., 1950  Admission Dx: Chest pain [R07.9]    Reason for Consult:  Cardiac evaluation    Requesting Physician: Lindsay Duncan MD    CHIEF COMPLAINT:  Chest pain    History Obtained From:  patient    HISTORY OF PRESENT ILLNESS:      The patient is a 71 y.o.  male who is admitted to the hospital for chest pain and breathlessness. Patient gives history that he has not been feeling well for the past week. He has had nausea and vomiting and generalized fatigue since the last week. In the past 2-3 days he has had intermittent sharp chest pain in the center of the chest not radiating to arm, neck or jaw or back. He complains of associated breathlessness since the past 2 days. No palpitations or sweating. He has not had similar chest pain in the past few months but only after his sickness started. Recent smokes half a pack of cigarettes a day since the past 45 years. No history of hypertension or diabetes. He has history of prostate cancer and is on Lupron. History of GERD, esophageal stricture and hiatal hernia. He says that this pain is different from his reflux pain. He denies previous cardiac history, stents or surgeries in the heart no history of rhythm abnormalities. No history of previous lung, liver or kidney diseases. In the ER, blood pressure is 160/67. Labs were normal.   he did troponin 11. EKG showed T-wave inversion from V3 to V6 similar to previous EKG. Stress test 2017 is normal with EF 67%.     Past Medical History:   has a past medical history of Arthritis, BPH (benign prostatic hyperplasia), Candida esophagitis ([address] Esophageal hiatus hernia, GERD (gastroesophageal reflux disease), History of dysphagia, History of esophageal stricture, History of radiation therapy, Prostate cancer ([address] Sleep (LOVENOX) injection 40 mg, 40 mg, Subcutaneous, Daily  ondansetron (ZOFRAN-ODT) disintegrating tablet 4 mg, 4 mg, Oral, Q8H PRN  oxyCODONE-acetaminophen (PERCOCET) 5-325 MG per tablet 1 tablet, 1 tablet, Oral, Q4H PRN **OR** oxyCODONE-acetaminophen (PERCOCET) 5-325 MG per tablet 2 tablet, 2 tablet, Oral, Q4H PRN    Allergies:  Patient has no known allergies. Social History:   reports that he has been smoking cigarettes. He has a 30.00 pack-year smoking history. He has never used smokeless tobacco. He reports that he does not drink alcohol or use drugs. Family History: family history includes High Blood Pressure in his father; Other in his mother. No h/o sudden cardiac death. Yes for premature CAD    REVIEW OF SYSTEMS:    · Constitutional: there has been no unanticipated weight loss. There's been no change in energy level, No change in activity level. · Eyes: No visual changes or diplopia. No scleral icterus. · ENT: No Headaches  · Cardiovascular: No cardiac history. Chest pain and dyspnea+  · Respiratory: No previous pulmonary problems, No cough  · Gastrointestinal: No abdominal pain. No change in bowel or bladder habits. · Genitourinary: No dysuria, trouble voiding, or hematuria. · Musculoskeletal:  No gait disturbance, No weakness or joint complaints. · Integumentary: No rash or pruritis. · Neurological: No headache, diplopia, change in muscle strength, numbness or tingling. No change in gait, balance, coordination, mood, affect, memory, mentation, behavior. · Psychiatric: No anxiety, or depression. · Endocrine: No temperature intolerance. No excessive thirst, fluid intake, or urination. No tremor. · Hematologic/Lymphatic: No abnormal bruising or bleeding, blood clots or swollen lymph nodes. · Allergic/Immunologic: No nasal congestion or hives.       PHYSICAL EXAM:      BP (!) 152/74   Pulse 62   Temp 98.4 °F (36.9 °C) (Oral)   Resp 18   Ht 5' 9.5\" (1.765 m)   Wt 230 lb (104.3 kg)   SpO2 93%   BMI 33.48 kg/m²    Constitutional and General Appearance: alert, cooperative, no distress and appears stated age  [de-identified]: PERRL, no cervical lymphadenopathy. No masses palpable. Normal oral mucosa  Respiratory:  · Normal excursion and expansion without use of accessory muscles  · Resp Auscultation: Good respiratory effort. No for increased work of breathing. On auscultation: clear to auscultation bilaterally  Cardiovascular:  · The apical impulse is not displaced  · Heart tones are normal. regular S1 and S2.  · Jugular venous pulsation Normal  · The carotid upstroke is normal in amplitude and contour without delay or bruit  · Peripheral pulses are symmetrical and full   Abdomen:   · No masses or tenderness  · Bowel sounds present  Extremities:  ·  No Cyanosis or Clubbing  ·  Lower extremity edema: No  ·  Skin: Warm and dry  Neurological:  · Alert and oriented. · Moves all extremities well  · No abnormalities of mood, affect, memory, mentation, or behavior are noted    DATA:    Diagnostics:    EKG: T-wave inversions V3 to V6 unchanged from previous tracings. ECHO: not obtained. Ejection fraction: 65%  Stress Test: ordered, but not yet obtained. Cardiac Angiography: not obtained. Labs:     CBC:   Recent Labs     04/04/19  1017   WBC 9.2   HGB 13.3   HCT 43.0        BMP:   Recent Labs     04/04/19  1017      K 3.8   CO2 22   BUN 12   CREATININE 0.90   LABGLOM >60   GLUCOSE 116*     FASTING LIPID PANEL:  Lab Results   Component Value Date    HDL 42 11/20/2014    LDLCALC 88 11/20/2014    TRIG 39 11/20/2014     LIVER PROFILE:  Recent Labs     04/04/19  1017   AST 14   ALT 9   LABALBU 4.4       IMPRESSION:    Patient Active Problem List   Diagnosis    BPH (benign prostatic hyperplasia)    Esophageal stricture    S/P TURP    Chest pain     1. Chest pain with mixed features. 2. Risk factors for CAD include only smoking. 3. Prostate CA and BP S/P TURP  4.  GERD/Esophageal stricture and Hiatal hernia    RECOMMENDATIONS:    1. Will obtain nuclear stress test for risk assessment. 2. Discharge after stres stest if normal.      Discussed with patient and Nurse. Electronically signed by Lisbet Dunn MD on 4/5/2019 at 10:23 [address] [place] [address] [facility]      [phone]    Attending Physician Statement  I have discussed the case of Sergey Childress including pertinent history and exam findings with the resident. I have seen and examined the patient and the key elements of the encounter have been performed by me. I agree with the assessment, plan and orders as documented by the resident With changes made to the note.      Electronically signed by Salome Eduardo MD on 4/5/2019 at 10:50 AM.    [facility]      [phone]

## 2019-04-05 NOTE — PLAN OF CARE
Pt arrives to floor from ER to room 337-1 , pt oriented to room , plan of care reviewed and history obtained , will cont to monitor patient

## 2019-04-05 NOTE — PROGRESS NOTES
Patient admitted, consent signed and questions answered. Patient ready for procedure. Call light to reach with side rails up 2 of 2. Bilateral groin clipped. Family at bedside with patient.

## 2019-04-05 NOTE — PROGRESS NOTES
CDU Daily Progress Note  Attending Physician       Pt Name: Jose Rafael Denton  MRN: [id number]  Sonya 1950  Date of evaluation: 4/5/19    I performed a history and physical examination of the patient and discussed management with the resident. I reviewed the residents note and agree with the documented findings and plan of care. Any areas of disagreement are noted on the chart. I was personally present for the key portions of any procedures. I have documented in the chart those procedures where I was not present during the key portions. I have reviewed the emergency nurses triage note. I agree with the chief complaint, past medical history, past surgical history, allergies, medications, social and family history as documented unless otherwise noted below. Documentation of the HPI, Physical Exam and Medical Decision Making performed by medical students or scribes is based on my personal performance of the HPI, PE and MDM. For Physician Assistant/ Nurse Practitioner cases/documentation I have personally evaluated this patient and have completed at least one if not all key elements of the E/M (history, physical exam, and MDM). Additional findings are as noted. The Family History, Social History and Review of Systems are unchanged from the previous day. No significant events overnight. Fatigued, nausea, decreased appetite and occasional chest pain w SOB for the last week. Has been on lupron for the last week for his prostate cancer. PMHx: GERD, smoker. ECG nonspecific, HSTrops 11, CXR no acute. Cards consulted    Patient smokes 15 cigarettes per day for 45 years. Smoking cessation counseling for 3 minutes. Discussed the effects of smoking in regards to heart disease, lung disease, stroke and cancer. I explained how this negatively effects their condition, will contribute to increased recovery time, and possible lead to further health problems in the future.     Krysten Cm MD  Attending Physician  Critical Decision Unit

## 2019-04-05 NOTE — FLOWSHEET NOTE
Resident, Jarrett Starr, notified that patient has returned to floor from stress test.  However, no stress test was done due to an abnormal EKG showing a new T-wave inversion. Pt was previously sent to cath lab from stress lab and then sent back to floor from cath lab. Narrator has called cath lab and been told that they are awaiting orders to cath patient and send transportation for him. Pt is currently experiencing no chest pain, but relates nausea. New telephone order for one time dose of 4mg IV zofran received.

## 2019-04-06 NOTE — DISCHARGE SUMMARY
CDU Discharge Summary        Patient:  Minna Medrano  YOB: 1950    MRN: [id number]   Acct: [de-identified]    Primary Care Physician: Asya Mullen DO    Admit date:  4/4/2019 10:00 AM  Discharge date: 4/5/2019  7:39 PM     Discharge Diagnoses:     1. 1.) Acute onset midsternal chest pain as well as nausea and left rib pain, etiology undetermined, improved with oral pain medications      · Cardiac workup in the emergency department was unremarkable except for a nonspecific ECG  · Patient also complaining of a mild cough and nausea, symptoms may be secondary to viral illness  · IP CONSULT TO CARDIOLOGY    Obtined cardiac cath due to EKG changes at stress test which revealed mild non-obstructive disease. 1. Medical Therapy. Start BP control; Norvasc 10 mg daily. 2. Risk Factors Modification including smoking cessation. 3. Post Cath Protocol    Follow-up:  Call today/tomorrow for a follow up appointment with Asya Mullen DO , or return to the Emergency Room with worsening symptoms    Stressed to patient the importance of following up with primary care doctor for further workup/management of symptoms. Pt verbalizes understanding and agrees with plan.     Discharge Medication Changes:       Medication List      START taking these medications    amLODIPine 10 MG tablet  Commonly known as:  NORVASC  Take 1 tablet by mouth daily        CONTINUE taking these medications    omeprazole 40 MG delayed release capsule  Commonly known as:  PRILOSEC  TAKE 1 CAPSULE ONE TIME DAILY  39 TO 60  MINUTES  BEFORE  FIRST  MEAL OF THE DAY           Where to Get Your Medications      [id number]  [address]    Hours:  24-hours Phone:  [phone]   · amLODIPine 10 MG tablet         Diet:  No diet orders on file, advance as tolerated     Activity:  As tolerated    Consultants: IP CONSULT TO CARDIOLOGY    Procedures:  Not indicated     Diagnostic Test:   Results for orders placed or performed during the hospital encounter of 04/04/19   CBC WITH AUTO DIFFERENTIAL   Result Value Ref Range    WBC 9.2 3.5 - 11.3 k/uL    RBC 4.79 4.21 - 5.77 m/uL    Hemoglobin 13.3 13.0 - 17.0 g/dL    Hematocrit 43.0 40.7 - 50.3 %    MCV 89.8 82.6 - 102.9 fL    MCH 27.8 25.2 - 33.5 pg    MCHC 30.9 28.4 - 34.8 g/dL    RDW 15.7 (H) 11.8 - 14.4 %    Platelets 181 239 - 792 k/uL    MPV 10.2 8.1 - 13.5 fL    NRBC Automated 0.0 0.0 per 100 WBC    Differential Type NOT REPORTED     Seg Neutrophils 73 (H) 36 - 65 %    Lymphocytes 19 (L) 24 - 43 %    Monocytes 7 3 - 12 %    Eosinophils % 1 1 - 4 %    Basophils 0 0 - 2 %    Immature Granulocytes 0 0 %    Segs Absolute 6.64 1.50 - 8.10 k/uL    Absolute Lymph # 1.77 1.10 - 3.70 k/uL    Absolute Mono # 0.61 0.10 - 1.20 k/uL    Absolute Eos # 0.09 0.00 - 0.44 k/uL    Basophils # 0.04 0.00 - 0.20 k/uL    Absolute Immature Granulocyte 0.03 0.00 - 0.30 k/uL    WBC Morphology NOT REPORTED     RBC Morphology ANISOCYTOSIS PRESENT     Platelet Estimate NOT REPORTED    Comprehensive Metabolic Panel   Result Value Ref Range    Glucose 116 (H) 70 - 99 mg/dL    BUN 12 8 - 23 mg/dL    CREATININE 0.90 0.70 - 1.20 mg/dL    Bun/Cre Ratio NOT REPORTED 9 - 20    Calcium 9.2 8.6 - 10.4 mg/dL    Sodium 141 135 - 144 mmol/L    Potassium 3.8 3.7 - 5.3 mmol/L    Chloride 107 98 - 107 mmol/L    CO2 22 20 - 31 mmol/L    Anion Gap 12 9 - 17 mmol/L    Alkaline Phosphatase 103 40 - 129 U/L    ALT 9 5 - 41 U/L    AST 14 <40 U/L    Total Bilirubin 0.30 0.3 - 1.2 mg/dL    Total Protein 7.8 6.4 - 8.3 g/dL    Alb 4.4 3.5 - 5.2 g/dL    Albumin/Globulin Ratio 1.3 1.0 - 2.5    GFR Non-African American >60 >60 mL/min    GFR African American >60 >60 mL/min    GFR Comment          GFR Staging NOT REPORTED    LIPASE   Result Value Ref Range    Lipase 28 13 - 60 U/L   Troponin   Result Value Ref Range    Troponin, High Sensitivity 11 0 - 22 ng/L    Troponin T NOT REPORTED <0.03 ng/mL    Troponin Interp NOT REPORTED    Brain Natriuretic Peptide   Result Value Ref Range    Pro- <300 pg/mL    BNP Interpretation Pro-BNP Reference Range:    Lactic Acid, Whole Blood   Result Value Ref Range    Lactic Acid, Whole Blood 0.9 0.7 - 2.1 mmol/L   TSH with Reflex   Result Value Ref Range    TSH 0.50 0.30 - 5.00 mIU/L   Urinalysis Reflex to Culture   Result Value Ref Range    Color, UA YELLOW YELLOW    Turbidity UA CLEAR CLEAR    Glucose, Ur NEGATIVE NEGATIVE    Bilirubin Urine NEGATIVE NEGATIVE    Ketones, Urine NEGATIVE NEGATIVE    Specific Gravity, UA 1.038 (H) 1.005 - 1.030    Urine Hgb SMALL (A) NEGATIVE    pH, UA 5.5 5.0 - 8.0    Protein, UA 2+ (A) NEGATIVE    Urobilinogen, Urine Normal Normal    Nitrite, Urine NEGATIVE NEGATIVE    Leukocyte Esterase, Urine NEGATIVE NEGATIVE    Urinalysis Comments NOT REPORTED    SPECIMEN REJECTION   Result Value Ref Range    Specimen Source . BLOOD     Ordered Test TROPI     Reason for Rejection Unable to perform testing: Specimen hemolyzed. - NOT REPORTED    Troponin   Result Value Ref Range    Troponin, High Sensitivity 11 0 - 22 ng/L    Troponin T NOT REPORTED <0.03 ng/mL    Troponin Interp NOT REPORTED    Troponin   Result Value Ref Range    Troponin, High Sensitivity 9 0 - 22 ng/L    Troponin T NOT REPORTED <0.03 ng/mL    Troponin Interp NOT REPORTED    Microscopic Urinalysis   Result Value Ref Range    -          WBC, UA 2 TO 5 0 - 5 /HPF    RBC, UA 2 TO 5 0 - 2 /HPF    Casts UA NOT REPORTED 0 - 2 /LPF    Crystals UA NOT REPORTED None /HPF    Epithelial Cells UA 0 TO 2 0 - 5 /HPF    Renal Epithelial, Urine NOT REPORTED 0 /HPF    Bacteria, UA NOT REPORTED None    Mucus, UA 1+ (A) None    Trichomonas, UA NOT REPORTED None    Amorphous, UA NOT REPORTED None    Other Observations UA NOT REPORTED NOT REQ.     Yeast, UA NOT REPORTED None   EKG 12 Lead   Result Value Ref Range    Ventricular Rate 72 BPM    Atrial Rate 72 BPM    P-R Interval 168 ms    QRS Duration 88 ms    Q-T Interval 400 ms    QTc Calculation (Bazett) 438 ms    P Axis 5 degrees    R Axis -22 degrees    T Axis 109 degrees   EKG 12 Lead   Result Value Ref Range    Ventricular Rate 60 BPM    Atrial Rate 60 BPM    P-R Interval 186 ms    QRS Duration 90 ms    Q-T Interval 438 ms    QTc Calculation (Bazett) 438 ms    P Axis 30 degrees    R Axis -6 degrees    T Axis 158 degrees     Xr Chest Standard (2 Vw)    Result Date: 4/4/2019  EXAMINATION: TWO VIEWS OF THE CHEST 4/4/2019 10:22 am COMPARISON: 24 August 2017 HISTORY: ORDERING SYSTEM PROVIDED HISTORY: chest pain TECHNOLOGIST PROVIDED HISTORY: chest pain FINDINGS: Overlying cardiac monitoring electrodes are present. No cardiomegaly, vascular congestion effusion or pneumothorax is noted. Strand-like opacities are present at the lighting bases consistent with mild atelectasis. Osseous and mediastinal structures are age-appropriate. Mild atelectasis. Otherwise unremarkable study. Physical Exam:    General appearance - NAD, AOx 3   Lungs -CTAB, no R/R/R  Heart - RRR, no M/R/G  Abdomen - Soft, NT/ND  Neurological:  MAEx4, No focal motor deficit, sensory loss  Extremities - Cap refil <2 sec in all ext., no edema  Skin -warm, dry      Hospital Course:  Clinical course has improved, labs and imaging reviewed. Jodi Britt originally presented to the hospital on 4/4/2019 10:00 AM with acute chest pain. Cardiac workup in the ED unremarkable. Cards consulted and obtained stress however there were ekg changes at stress and decision was made for heart cath. Cath revealed non-obstructive disease and pt discharged with medical therapy. At that time it was determined that He required further observation and testing and consultation. Labs and imaging were followed daily. Imaging results as above. He is medically stable to be discharged.        Disposition: Home    Patient stated that they

## 2019-04-11 ENCOUNTER — HOSPITAL ENCOUNTER (INPATIENT)
Age: 69
LOS: 1 days | Discharge: HOME | DRG: 390 | End: 2019-04-13
Attending: EMERGENCY MEDICINE | Admitting: INTERNAL MEDICINE
Payer: MEDICARE

## 2019-04-11 ENCOUNTER — APPOINTMENT (OUTPATIENT)
Dept: CT IMAGING | Age: 69
DRG: 390 | End: 2019-04-11
Payer: MEDICARE

## 2019-04-11 DIAGNOSIS — K56.7 ILEUS (HCC): Primary | ICD-10-CM

## 2019-04-11 LAB
ABSOLUTE EOS #: 0.2 K/UL (ref 0–0.44)
ABSOLUTE IMMATURE GRANULOCYTE: 0.04 K/UL (ref 0–0.3)
ABSOLUTE LYMPH #: 2.28 K/UL (ref 1.1–3.7)
ABSOLUTE MONO #: 0.65 K/UL (ref 0.1–1.2)
ALBUMIN SERPL-MCNC: 4.3 G/DL (ref 3.5–5.2)
ALBUMIN/GLOBULIN RATIO: 1.4 (ref 1–2.5)
ALP BLD-CCNC: 87 U/L (ref 40–129)
ALT SERPL-CCNC: 9 U/L (ref 5–41)
ANION GAP SERPL CALCULATED.3IONS-SCNC: 16 MMOL/L (ref 9–17)
AST SERPL-CCNC: 11 U/L
BASOPHILS # BLD: 1 % (ref 0–2)
BASOPHILS ABSOLUTE: 0.05 K/UL (ref 0–0.2)
BILIRUB SERPL-MCNC: 0.25 MG/DL (ref 0.3–1.2)
BILIRUBIN DIRECT: <0.08 MG/DL
BILIRUBIN, INDIRECT: ABNORMAL MG/DL (ref 0–1)
BNP INTERPRETATION: NORMAL
BUN BLDV-MCNC: 11 MG/DL (ref 8–23)
BUN/CREAT BLD: ABNORMAL (ref 9–20)
CALCIUM SERPL-MCNC: 9 MG/DL (ref 8.6–10.4)
CHLORIDE BLD-SCNC: 104 MMOL/L (ref 98–107)
CO2: 23 MMOL/L (ref 20–31)
CREAT SERPL-MCNC: 1.01 MG/DL (ref 0.7–1.2)
DIFFERENTIAL TYPE: ABNORMAL
EOSINOPHILS RELATIVE PERCENT: 2 % (ref 1–4)
GFR AFRICAN AMERICAN: >60 ML/MIN
GFR NON-AFRICAN AMERICAN: >60 ML/MIN
GFR SERPL CREATININE-BSD FRML MDRD: ABNORMAL ML/MIN/{1.73_M2}
GFR SERPL CREATININE-BSD FRML MDRD: ABNORMAL ML/MIN/{1.73_M2}
GLOBULIN: ABNORMAL G/DL (ref 1.5–3.8)
GLUCOSE BLD-MCNC: 142 MG/DL (ref 70–99)
HCT VFR BLD CALC: 41.5 % (ref 40.7–50.3)
HEMOGLOBIN: 12.7 G/DL (ref 13–17)
IMMATURE GRANULOCYTES: 1 %
LIPASE: 38 U/L (ref 13–60)
LYMPHOCYTES # BLD: 28 % (ref 24–43)
MAGNESIUM: 2.2 MG/DL (ref 1.6–2.6)
MCH RBC QN AUTO: 27.7 PG (ref 25.2–33.5)
MCHC RBC AUTO-ENTMCNC: 30.6 G/DL (ref 28.4–34.8)
MCV RBC AUTO: 90.4 FL (ref 82.6–102.9)
MONOCYTES # BLD: 8 % (ref 3–12)
NRBC AUTOMATED: 0 PER 100 WBC
PDW BLD-RTO: 15.8 % (ref 11.8–14.4)
PLATELET # BLD: 278 K/UL (ref 138–453)
PLATELET ESTIMATE: ABNORMAL
PMV BLD AUTO: 11 FL (ref 8.1–13.5)
POTASSIUM SERPL-SCNC: 3.4 MMOL/L (ref 3.7–5.3)
PRO-BNP: 252 PG/ML
RBC # BLD: 4.59 M/UL (ref 4.21–5.77)
RBC # BLD: ABNORMAL 10*6/UL
SEG NEUTROPHILS: 60 % (ref 36–65)
SEGMENTED NEUTROPHILS ABSOLUTE COUNT: 5 K/UL (ref 1.5–8.1)
SODIUM BLD-SCNC: 143 MMOL/L (ref 135–144)
TOTAL PROTEIN: 7.4 G/DL (ref 6.4–8.3)
WBC # BLD: 8.2 K/UL (ref 3.5–11.3)
WBC # BLD: ABNORMAL 10*3/UL

## 2019-04-11 PROCEDURE — 6360000004 HC RX CONTRAST MEDICATION: Performed by: EMERGENCY MEDICINE

## 2019-04-11 PROCEDURE — 2580000003 HC RX 258: Performed by: STUDENT IN AN ORGANIZED HEALTH CARE EDUCATION/TRAINING PROGRAM

## 2019-04-11 PROCEDURE — 83735 ASSAY OF MAGNESIUM: CPT

## 2019-04-11 PROCEDURE — 81001 URINALYSIS AUTO W/SCOPE: CPT

## 2019-04-11 PROCEDURE — 96375 TX/PRO/DX INJ NEW DRUG ADDON: CPT

## 2019-04-11 PROCEDURE — 74177 CT ABD & PELVIS W/CONTRAST: CPT

## 2019-04-11 PROCEDURE — 96372 THER/PROPH/DIAG INJ SC/IM: CPT

## 2019-04-11 PROCEDURE — 85025 COMPLETE CBC W/AUTO DIFF WBC: CPT

## 2019-04-11 PROCEDURE — 80048 BASIC METABOLIC PNL TOTAL CA: CPT

## 2019-04-11 PROCEDURE — 96365 THER/PROPH/DIAG IV INF INIT: CPT

## 2019-04-11 PROCEDURE — 99285 EMERGENCY DEPT VISIT HI MDM: CPT

## 2019-04-11 PROCEDURE — 6360000002 HC RX W HCPCS: Performed by: STUDENT IN AN ORGANIZED HEALTH CARE EDUCATION/TRAINING PROGRAM

## 2019-04-11 PROCEDURE — 80076 HEPATIC FUNCTION PANEL: CPT

## 2019-04-11 PROCEDURE — 83690 ASSAY OF LIPASE: CPT

## 2019-04-11 PROCEDURE — 83880 ASSAY OF NATRIURETIC PEPTIDE: CPT

## 2019-04-11 RX ORDER — 0.9 % SODIUM CHLORIDE 0.9 %
1000 INTRAVENOUS SOLUTION INTRAVENOUS ONCE
Status: COMPLETED | OUTPATIENT
Start: 2019-04-11 | End: 2019-04-12

## 2019-04-11 RX ORDER — POTASSIUM CHLORIDE 7.45 MG/ML
40 INJECTION INTRAVENOUS ONCE
Status: COMPLETED | OUTPATIENT
Start: 2019-04-11 | End: 2019-04-12

## 2019-04-11 RX ORDER — KETOROLAC TROMETHAMINE 15 MG/ML
15 INJECTION, SOLUTION INTRAMUSCULAR; INTRAVENOUS ONCE
Status: COMPLETED | OUTPATIENT
Start: 2019-04-11 | End: 2019-04-11

## 2019-04-11 RX ORDER — ONDANSETRON 2 MG/ML
4 INJECTION INTRAMUSCULAR; INTRAVENOUS ONCE
Status: COMPLETED | OUTPATIENT
Start: 2019-04-11 | End: 2019-04-11

## 2019-04-11 RX ORDER — PROMETHAZINE HYDROCHLORIDE 25 MG/ML
6.25 INJECTION, SOLUTION INTRAMUSCULAR; INTRAVENOUS ONCE
Status: COMPLETED | OUTPATIENT
Start: 2019-04-11 | End: 2019-04-11

## 2019-04-11 RX ADMIN — ONDANSETRON 4 MG: 2 INJECTION INTRAMUSCULAR; INTRAVENOUS at 21:08

## 2019-04-11 RX ADMIN — POTASSIUM CHLORIDE 10 MEQ: 7.46 INJECTION, SOLUTION INTRAVENOUS at 23:02

## 2019-04-11 RX ADMIN — PROMETHAZINE HYDROCHLORIDE 6.25 MG: 25 INJECTION INTRAMUSCULAR; INTRAVENOUS at 21:26

## 2019-04-11 RX ADMIN — SODIUM CHLORIDE 1000 ML: 9 INJECTION, SOLUTION INTRAVENOUS at 23:02

## 2019-04-11 RX ADMIN — IOHEXOL 75 ML: 350 INJECTION, SOLUTION INTRAVENOUS at 22:00

## 2019-04-11 RX ADMIN — KETOROLAC TROMETHAMINE 15 MG: 15 INJECTION, SOLUTION INTRAMUSCULAR; INTRAVENOUS at 21:26

## 2019-04-11 ASSESSMENT — ENCOUNTER SYMPTOMS
ABDOMINAL PAIN: 1
EYE ITCHING: 0
ABDOMINAL DISTENTION: 0
COUGH: 0
CONSTIPATION: 1
VOMITING: 0
EYE DISCHARGE: 0
RECTAL PAIN: 0
SORE THROAT: 0
CHEST TIGHTNESS: 0
CHOKING: 0
RHINORRHEA: 0
DIARRHEA: 0
ANAL BLEEDING: 0
NAUSEA: 1
BLOOD IN STOOL: 0
SHORTNESS OF BREATH: 0

## 2019-04-11 ASSESSMENT — PAIN SCALES - GENERAL
PAINLEVEL_OUTOF10: 0
PAINLEVEL_OUTOF10: 9

## 2019-04-11 ASSESSMENT — PAIN DESCRIPTION - ORIENTATION: ORIENTATION: LOWER

## 2019-04-11 ASSESSMENT — PAIN DESCRIPTION - LOCATION: LOCATION: BACK

## 2019-04-11 ASSESSMENT — PAIN DESCRIPTION - PAIN TYPE: TYPE: ACUTE PAIN

## 2019-04-12 PROBLEM — R09.89 LABILE BLOOD PRESSURE: Status: ACTIVE | Noted: 2019-04-12

## 2019-04-12 PROBLEM — E87.6 HYPOKALEMIA: Status: ACTIVE | Noted: 2019-04-12

## 2019-04-12 PROBLEM — K56.7 ILEUS (HCC): Status: ACTIVE | Noted: 2019-04-12

## 2019-04-12 PROBLEM — Z72.0 TOBACCO USE: Status: ACTIVE | Noted: 2019-04-12

## 2019-04-12 PROCEDURE — 2500000003 HC RX 250 WO HCPCS: Performed by: NURSE PRACTITIONER

## 2019-04-12 PROCEDURE — 99222 1ST HOSP IP/OBS MODERATE 55: CPT | Performed by: INTERNAL MEDICINE

## 2019-04-12 PROCEDURE — 6360000002 HC RX W HCPCS: Performed by: NURSE PRACTITIONER

## 2019-04-12 PROCEDURE — 99406 BEHAV CHNG SMOKING 3-10 MIN: CPT

## 2019-04-12 PROCEDURE — 6370000000 HC RX 637 (ALT 250 FOR IP): Performed by: NURSE PRACTITIONER

## 2019-04-12 PROCEDURE — 96376 TX/PRO/DX INJ SAME DRUG ADON: CPT

## 2019-04-12 PROCEDURE — 6360000002 HC RX W HCPCS: Performed by: STUDENT IN AN ORGANIZED HEALTH CARE EDUCATION/TRAINING PROGRAM

## 2019-04-12 PROCEDURE — 1200000000 HC SEMI PRIVATE

## 2019-04-12 PROCEDURE — 94760 N-INVAS EAR/PLS OXIMETRY 1: CPT

## 2019-04-12 PROCEDURE — 2500000003 HC RX 250 WO HCPCS: Performed by: INTERNAL MEDICINE

## 2019-04-12 PROCEDURE — G0378 HOSPITAL OBSERVATION PER HR: HCPCS

## 2019-04-12 PROCEDURE — 6360000002 HC RX W HCPCS: Performed by: INTERNAL MEDICINE

## 2019-04-12 PROCEDURE — 2580000003 HC RX 258: Performed by: NURSE PRACTITIONER

## 2019-04-12 RX ORDER — KETOROLAC TROMETHAMINE 15 MG/ML
15 INJECTION, SOLUTION INTRAMUSCULAR; INTRAVENOUS ONCE
Status: COMPLETED | OUTPATIENT
Start: 2019-04-12 | End: 2019-04-12

## 2019-04-12 RX ORDER — ONDANSETRON 2 MG/ML
8 INJECTION INTRAMUSCULAR; INTRAVENOUS ONCE
Status: DISCONTINUED | OUTPATIENT
Start: 2019-04-12 | End: 2019-04-13 | Stop reason: HOSPADM

## 2019-04-12 RX ORDER — MAGNESIUM SULFATE 1 G/100ML
1 INJECTION INTRAVENOUS PRN
Status: DISCONTINUED | OUTPATIENT
Start: 2019-04-12 | End: 2019-04-13 | Stop reason: HOSPADM

## 2019-04-12 RX ORDER — ONDANSETRON 2 MG/ML
4 INJECTION INTRAMUSCULAR; INTRAVENOUS ONCE
Status: COMPLETED | OUTPATIENT
Start: 2019-04-12 | End: 2019-04-12

## 2019-04-12 RX ORDER — POTASSIUM CHLORIDE 20 MEQ/1
40 TABLET, EXTENDED RELEASE ORAL PRN
Status: DISCONTINUED | OUTPATIENT
Start: 2019-04-12 | End: 2019-04-13 | Stop reason: HOSPADM

## 2019-04-12 RX ORDER — METOPROLOL TARTRATE 5 MG/5ML
5 INJECTION INTRAVENOUS EVERY 6 HOURS PRN
Status: DISCONTINUED | OUTPATIENT
Start: 2019-04-12 | End: 2019-04-13 | Stop reason: HOSPADM

## 2019-04-12 RX ORDER — POTASSIUM CHLORIDE 7.45 MG/ML
10 INJECTION INTRAVENOUS PRN
Status: DISCONTINUED | OUTPATIENT
Start: 2019-04-12 | End: 2019-04-13 | Stop reason: HOSPADM

## 2019-04-12 RX ORDER — ONDANSETRON 2 MG/ML
4 INJECTION INTRAMUSCULAR; INTRAVENOUS EVERY 6 HOURS PRN
Status: DISCONTINUED | OUTPATIENT
Start: 2019-04-12 | End: 2019-04-13 | Stop reason: HOSPADM

## 2019-04-12 RX ORDER — 0.9 % SODIUM CHLORIDE 0.9 %
VIAL (ML) INJECTION
Status: DISCONTINUED
Start: 2019-04-12 | End: 2019-04-12 | Stop reason: WASHOUT

## 2019-04-12 RX ORDER — SODIUM CHLORIDE 0.9 % (FLUSH) 0.9 %
10 SYRINGE (ML) INJECTION PRN
Status: DISCONTINUED | OUTPATIENT
Start: 2019-04-12 | End: 2019-04-13 | Stop reason: HOSPADM

## 2019-04-12 RX ORDER — PROMETHAZINE HYDROCHLORIDE 25 MG/ML
12.5 INJECTION, SOLUTION INTRAMUSCULAR; INTRAVENOUS ONCE
Status: DISCONTINUED | OUTPATIENT
Start: 2019-04-12 | End: 2019-04-13 | Stop reason: HOSPADM

## 2019-04-12 RX ORDER — ACETAMINOPHEN 325 MG/1
650 TABLET ORAL EVERY 4 HOURS PRN
Status: DISCONTINUED | OUTPATIENT
Start: 2019-04-12 | End: 2019-04-13 | Stop reason: HOSPADM

## 2019-04-12 RX ORDER — SODIUM CHLORIDE 0.9 % (FLUSH) 0.9 %
10 SYRINGE (ML) INJECTION EVERY 12 HOURS SCHEDULED
Status: DISCONTINUED | OUTPATIENT
Start: 2019-04-12 | End: 2019-04-13 | Stop reason: HOSPADM

## 2019-04-12 RX ORDER — PROMETHAZINE HYDROCHLORIDE 25 MG/ML
25 INJECTION, SOLUTION INTRAMUSCULAR; INTRAVENOUS EVERY 6 HOURS PRN
Status: DISCONTINUED | OUTPATIENT
Start: 2019-04-12 | End: 2019-04-13 | Stop reason: HOSPADM

## 2019-04-12 RX ORDER — NICOTINE 21 MG/24HR
1 PATCH, TRANSDERMAL 24 HOURS TRANSDERMAL DAILY PRN
Status: DISCONTINUED | OUTPATIENT
Start: 2019-04-12 | End: 2019-04-13 | Stop reason: HOSPADM

## 2019-04-12 RX ORDER — DEXTROSE, SODIUM CHLORIDE, AND POTASSIUM CHLORIDE 5; .45; .15 G/100ML; G/100ML; G/100ML
INJECTION INTRAVENOUS CONTINUOUS
Status: DISCONTINUED | OUTPATIENT
Start: 2019-04-12 | End: 2019-04-13

## 2019-04-12 RX ADMIN — PROMETHAZINE HYDROCHLORIDE 25 MG: 25 INJECTION INTRAMUSCULAR; INTRAVENOUS at 22:11

## 2019-04-12 RX ADMIN — Medication 240 ML: at 00:49

## 2019-04-12 RX ADMIN — PROMETHAZINE HYDROCHLORIDE 25 MG: 25 INJECTION INTRAMUSCULAR; INTRAVENOUS at 15:26

## 2019-04-12 RX ADMIN — Medication 10 ML: at 20:39

## 2019-04-12 RX ADMIN — KETOROLAC TROMETHAMINE 15 MG: 15 INJECTION, SOLUTION INTRAMUSCULAR; INTRAVENOUS at 01:43

## 2019-04-12 RX ADMIN — POTASSIUM CHLORIDE, DEXTROSE MONOHYDRATE AND SODIUM CHLORIDE: 150; 5; 450 INJECTION, SOLUTION INTRAVENOUS at 05:50

## 2019-04-12 RX ADMIN — ONDANSETRON 4 MG: 2 INJECTION INTRAMUSCULAR; INTRAVENOUS at 01:59

## 2019-04-12 RX ADMIN — FAMOTIDINE 20 MG: 10 INJECTION, SOLUTION INTRAVENOUS at 08:14

## 2019-04-12 RX ADMIN — FAMOTIDINE 20 MG: 10 INJECTION, SOLUTION INTRAVENOUS at 20:40

## 2019-04-12 RX ADMIN — PROMETHAZINE HYDROCHLORIDE 25 MG: 25 INJECTION INTRAMUSCULAR; INTRAVENOUS at 06:29

## 2019-04-12 RX ADMIN — METOPROLOL TARTRATE 5 MG: 1 INJECTION, SOLUTION INTRAVENOUS at 20:40

## 2019-04-12 RX ADMIN — MAGNESIUM HYDROXIDE 30 ML: 400 SUSPENSION ORAL at 09:58

## 2019-04-12 RX ADMIN — ENOXAPARIN SODIUM 40 MG: 40 INJECTION SUBCUTANEOUS at 08:14

## 2019-04-12 RX ADMIN — POTASSIUM CHLORIDE, DEXTROSE MONOHYDRATE AND SODIUM CHLORIDE: 150; 5; 450 INJECTION, SOLUTION INTRAVENOUS at 16:20

## 2019-04-12 RX ADMIN — POTASSIUM CHLORIDE 10 MEQ: 7.46 INJECTION, SOLUTION INTRAVENOUS at 05:50

## 2019-04-12 RX ADMIN — KETOROLAC TROMETHAMINE 15 MG: 15 INJECTION, SOLUTION INTRAMUSCULAR; INTRAVENOUS at 18:08

## 2019-04-12 ASSESSMENT — PAIN SCALES - GENERAL
PAINLEVEL_OUTOF10: 7
PAINLEVEL_OUTOF10: 7
PAINLEVEL_OUTOF10: 0
PAINLEVEL_OUTOF10: 7
PAINLEVEL_OUTOF10: 7
PAINLEVEL_OUTOF10: 4
PAINLEVEL_OUTOF10: 8

## 2019-04-12 ASSESSMENT — PAIN DESCRIPTION - DESCRIPTORS: DESCRIPTORS: CONSTANT

## 2019-04-12 ASSESSMENT — ENCOUNTER SYMPTOMS
DIARRHEA: 0
NAUSEA: 1
BLOOD IN STOOL: 0
VOMITING: 0
PHOTOPHOBIA: 0
COUGH: 0
WHEEZING: 0
ABDOMINAL DISTENTION: 0
SHORTNESS OF BREATH: 0
CONSTIPATION: 0
ABDOMINAL PAIN: 1

## 2019-04-12 ASSESSMENT — PAIN DESCRIPTION - PROGRESSION

## 2019-04-12 ASSESSMENT — PAIN DESCRIPTION - PAIN TYPE
TYPE: ACUTE PAIN
TYPE: ACUTE PAIN

## 2019-04-12 ASSESSMENT — PAIN DESCRIPTION - FREQUENCY: FREQUENCY: CONTINUOUS

## 2019-04-12 ASSESSMENT — PAIN DESCRIPTION - ORIENTATION
ORIENTATION: MID
ORIENTATION: MID

## 2019-04-12 ASSESSMENT — PAIN DESCRIPTION - LOCATION
LOCATION: ABDOMEN
LOCATION: ABDOMEN

## 2019-04-12 ASSESSMENT — PAIN DESCRIPTION - ONSET: ONSET: ON-GOING

## 2019-04-12 NOTE — ED PROVIDER NOTES
[facility]  Emergency Department  Faculty Attestation     I performed a history and physical examination of the patient and discussed management with the resident. I reviewed the residents note and agree with the documented findings and plan of care. Any areas of disagreement are noted on the chart. I was personally present for the key portions of any procedures. I have documented in the chart those procedures where I was not present during the key portions. I have reviewed the emergency nurses triage note. I agree with the chief complaint, past medical history, past surgical history, allergies, medications, social and family history as documented unless otherwise noted below. For Physician Assistant/ Nurse Practitioner cases/documentation I have personally evaluated this patient and have completed at least one if not all key elements of the E/M (history, physical exam, and MDM). Additional findings are as noted. Primary Care Physician:  Aj Morales DO    Screenings:  [unfilled]    CHIEF COMPLAINT       Chief Complaint   Patient presents with    Nausea    Constipation    Back Pain       RECENT VITALS:   Temp: 98.7 °F (37.1 °C),  Pulse: 86, Resp: 20, BP: (!) 149/70    LABS:  Labs Reviewed   BASIC METABOLIC PANEL W/ REFLEX TO MG FOR LOW K - Abnormal; Notable for the following components:       Result Value    Glucose 142 (*)     Potassium 3.4 (*)     All other components within normal limits   MAGNESIUM   URINALYSIS WITH MICROSCOPIC   HEPATIC FUNCTION PANEL   LIPASE       Radiology  CT ABDOMEN PELVIS W IV CONTRAST Additional Contrast? None    (Results Pending)       EKG:    Attending Physician Additional  Notes    Patient has not had a bowel movement for the past 2 weeks. He has loss of appetite. He was recently admitted for similar symptoms and found to have ACS and had cardiac catheterization. He denies chest pain terms of breath. No leg edema.   No abdominal pain. Normal urination. No vomiting. On exam he is nontoxic afebrile vital signs normal.  Abdomen is benign. No tympany distention rebound or guarding or mass. Concern is for ileus, dehydration, other cause. Plan is labs fluids CT imaging reassess. Elijah Rubi.  Franny Ga MD, [facility]  Attending Emergency  Physician                Neema Hernandez MD  04/11/19 [id number]

## 2019-04-12 NOTE — ED NOTES
Pt had a small bowel movement in bedside commode.      Linda Roper RN  04/12/19 0829       Linda Roper RN  04/12/19 [id number]

## 2019-04-12 NOTE — H&P
[facility]       HISTORY AND PHYSICAL EXAMINATION            Date:   4/12/2019  Patient name:  Kyra Rizvi  Date of admission:  4/11/2019  8:15 PM  MRN:   [id number]  Account:  [de-identified]  YOB: 1950  PCP:    Katie Hunt DO  Room:   4977/1644-38  Code Status:    Full Code    Chief Complaint:     Chief Complaint   Patient presents with    Nausea    Constipation    Back Pain       History Obtained From:     patient, electronic medical record    History of Present Illness: The patient is a 71 y.o. male who presents with:   Nausea; Constipation; and Back Pain     Patient was admitted thru ER with:  Devon Greene is a 71 y.o. male with past medical history of essential hypertension, esophageal stricture came to the emergency room for nausea, constipation and abdominal pain. Patient is having constipation for the last 2 week associated with  nausea and lower abdominal pain. Patient was seen here one week back complaining of nausea, epigastric pain and constipation. Stress test was concerning for ischemia, patient had cardiac cath which was unremarkable. Patient was discharged home. Per patient, he received 2 enemas at home but he did not had any bowel movement. Patient does not have any history of constipation. Patient denies taking start taking any new medication or herbal or over-the-counter pain. \"    43-year-old male readmitted to the hospital with nausea and abdominal pain  It appears that he was here recently - discharged on 4/5  He did undergo cardiac cath on 4/5:  Conclusions:  1. Mild non-obstructive CAD  2. Overall preserved LV function   Recommendations:  1. Medical Therapy. Start BP control; Norvasc 10 mg daily. 2. Risk Factors Modification including smoking cessation.   3. Post Cath Protocol  The patient responded to medical therapy  He states that after a couple days at home his symptoms recurred    He has been having nausea  He has passed gas but no BM  Appetites been poor    Initial database has revealed:  CT abdomen:  Impression:   Too numerous to count low-density liver lesions are likely cysts. These are  stable. Stable adrenal glands. Multifocal small-bowel distention with fluid levels suggesting ileus. Nonspecific enteritis is possible. Mesenteric vessels are patent. No bowel wall thickening.      Lipase 38     Pro-     WBC 8.2 k/uL RBC 4.59 m/uL Hemoglobin 12.7Low     BP has been in the 170s/90s      Past Medical History:     Past Medical History:   Diagnosis Date    Arthritis     BPH (benign prostatic hyperplasia)     Candida esophagitis ([facility] 2017    Esophageal hiatus hernia     GERD (gastroesophageal reflux disease)     History of dysphagia     several EGDs with dilation    History of esophageal stricture     History of radiation therapy     for prostate cancer    Prostate cancer ([facility] 2017    states scheduled for surgery in the next month    Sleep apnea     no machine, says uses nasal pillows     Smoker         Past Surgical History:     Past Surgical History:   Procedure Laterality Date    COLONOSCOPY      COLONOSCOPY  2018    COLONOSCOPY POLYPECTOMY SNARE/COLD BIOPSY performed by Tiffany Anders MD at  [facility] [address] COLONOSCOPY  2018    COLONOSCOPY WITH BIOPSY performed by Tiffany Anders MD at [facility], COLON, DIAGNOSTIC      KNEE SURGERY Right     meniscus    IN EGD BALLOON DILATION ESOPHAGUS <30 MM DIAM N/A 2017    EGD ESOPHAGOGASTRODUODENOSCOPY DILATATION performed by T[facility] Anders MD at [facility]    IN  [address] <30 MM DIAM N/A 2017    EGD ESOPHAGOGASTRODUODENOSCOPY DILATATION performed by Tiffany Anders MD at [facility]    IN ESOPHAGOGASTRODUODENOSCOPY TRANSORAL DIAGNOSTIC N/A 2017    EGD ESOPHAGOGASTRODUODENOSCOPY  AND PEDIATRIC SCOPE performed by Luis M MAURO Francisco Corona MD at [facility] ESOPHAGOGASTRODUODENOSCOPY TRANSORAL DIAGNOSTIC  3/17/2017    EGD ESOPHAGOGASTRODUODENOSCOPY performed by [facility]ardo [facility] at [facility]  May 2015    TOTAL HIP ARTHROPLASTY Right     UPPER GASTROINTESTINAL ENDOSCOPY  01/30/2017    egd with dilation    UPPER GASTROINTESTINAL ENDOSCOPY  02/13/2017    UPPER GASTROINTESTINAL ENDOSCOPY N/A 6/2/2017    EGD ESOPHAGOGASTRODUODENOSCOPY WITH DILATION SAVORY DILATORS 14-15-16MM performed by Monico Eduardo MD at [address] [place] [address] N/A 5/29/2018    EGD DIAGNOSTIC ONLY performed by Monico Eduardo MD at [address] [place] [address] N/A 6/28/2018    EGD DILATION SAVORY performed by Monico Eduardo MD at [address] [place] [address] N/A 12/13/2018    EGD BIOPSY performed by Monico Eduardo MD at [address] [place] [address]  12/13/2018    EGD DILATION SAVORY performed by Monico Eduardo MD at [address] [place] [address] N/A 1/15/2019    EGD DILATION SAVORY performed by Qi Hopson MD at [facility]        Medications Prior to Admission:     Prior to Admission medications    Medication Sig Start Date End Date Taking? Authorizing Provider   amLODIPine (NORVASC) 10 MG tablet Take 1 tablet by mouth daily 4/5/19  Yes Marisabel Davis MD   omeprazole (PRILOSEC) 40 MG delayed release capsule TAKE 1 CAPSULE ONE TIME DAILY  45 TO 60  MINUTES  BEFORE  FIRST  MEAL OF THE DAY 11/15/17  Yes Monico Eduardo MD        Allergies:     Patient has no known allergies. Social History:     Tobacco:    reports that he has been smoking cigarettes. He has a 30.00 pack-year smoking history. He has never used smokeless tobacco.  Alcohol:  reports that he does not drink alcohol. Drug Use:  reports that he does not use drugs.     Family History:     Family History   Problem Relation Age of Onset  Other Mother         breathing problems    High Blood Pressure Father        Review of Systems:     Positive and Negative as describedin HPI. Review of Systems   Constitutional: Positive for appetite change (Decreased). Negative for activity change, chills, diaphoresis, fatigue and fever. HENT: Negative for congestion and nosebleeds. Eyes: Negative for photophobia and visual disturbance. Respiratory: Negative for cough, shortness of breath and wheezing. Cardiovascular: Negative for chest pain and palpitations. Gastrointestinal: Positive for abdominal pain (vague, nonlocalized cramping) and nausea. Negative for abdominal distention, blood in stool, constipation, diarrhea and vomiting. Genitourinary: Negative for flank pain and hematuria. Musculoskeletal: Negative for arthralgias and myalgias. Skin: Negative for rash and wound. Neurological: Negative for dizziness and light-headedness. Psychiatric/Behavioral: Positive for sleep disturbance (not sleeping well). Physical Exam:   BP (!) 178/77   Pulse 72   Temp 98.5 °F (36.9 °C) (Oral)   Resp 18   Ht 5' 9\" (1.753 m)   Wt 237 lb 7 oz (107.7 kg)   SpO2 96%   BMI 35.06 kg/m²   Temp (24hrs), Av.6 °F (37 °C), Min:98.5 °F (36.9 °C), Max:98.7 °F (37.1 °C)    No results for input(s): POCGLU in the last 72 hours. Intake/Output Summary (Last 24 hours) at 2019 1122  Last data filed at 2019 0050  Gross per 24 hour   Intake 1100 ml   Output --   Net 1100 ml       Physical Exam   Constitutional: No distress. HENT:   Head: Normocephalic. Nose: Nose normal.   Eyes: Conjunctivae are normal. No scleral icterus. Neck: Neck supple. No tracheal deviation present. Cardiovascular: Normal rate and regular rhythm. Pulmonary/Chest: Effort normal and breath sounds normal. No respiratory distress. He has no wheezes. He has no rales. He exhibits no tenderness. Abdominal: Soft.  Bowel sounds are normal. He exhibits no distension 27.7 25.2 - 33.5 pg    MCHC 30.6 28.4 - 34.8 g/dL    RDW 15.8 (H) 11.8 - 14.4 %    Platelets 270 449 - 624 k/uL    MPV 11.0 8.1 - 13.5 fL    NRBC Automated 0.0 0.0 per 100 WBC    Differential Type NOT REPORTED     Seg Neutrophils 60 36 - 65 %    Lymphocytes 28 24 - 43 %    Monocytes 8 3 - 12 %    Eosinophils % 2 1 - 4 %    Basophils 1 0 - 2 %    Immature Granulocytes 1 (H) 0 %    Segs Absolute 5.00 1.50 - 8.10 k/uL    Absolute Lymph # 2.28 1.10 - 3.70 k/uL    Absolute Mono # 0.65 0.10 - 1.20 k/uL    Absolute Eos # 0.20 0.00 - 0.44 k/uL    Basophils # 0.05 0.00 - 0.20 k/uL    Absolute Immature Granulocyte 0.04 0.00 - 0.30 k/uL    WBC Morphology NOT REPORTED     RBC Morphology ANISOCYTOSIS PRESENT     Platelet Estimate NOT REPORTED        Imaging/Diagnostics:  See Above    Assessment :     Principal Problem:    Ileus (HCC)  Active Problems:    Esophageal stricture    Hypokalemia    Tobacco use    Labile blood pressure    GERD (gastroesophageal reflux disease)    History of radiation therapy    Sleep apnea  Resolved Problems:    * No resolved hospital problems. *        Plan:     Admit  Pain control  Antiemetics  Pepcid  Surgical consultation - laxatives   Correct electrolyte abnormalities  Blood Pressure - Monitor and control  Reflux precautions  CPAP  Smoking cessation / education   DVT prophylaxis   GI follow-up - Dr Kristen Calzada     Consultations:   Tessa Zamora     Patient is admitted as inpatient status because of co-morbidities listed above, severity of signs and symptoms as outlined, requirement for current medicaltherapies and most importantly because of direct risk to patient if care not provided in a hospital setting.   EHR - recommended inpatient status    Urban Sandifer, DO  4/12/2019  11:22 AM    Copy sent to Dr. Hima Cueto DO

## 2019-04-12 NOTE — PROGRESS NOTES
Nutrition Assessment    Type and Reason for Visit: Initial, Positive Nutrition Screen(Nausea/Vomiting)    Nutrition Recommendations:   - Continue Clear Liquid diet - monitor for diet advancement as tolerated/appropriate. - Will provide Ensure Clear Liquid ONS with meals.  - Monitor abdominal pain, nausea, and bowel function. Nutrition Assessment: Pt nutritionally compromised AEB reports of decreased appetite and constipation x 2 weeks. Pt also reports having nausea and abdominal pain which have been preventing him from eating. Reports weight loss - per EHR minimal weight changes noted. Pt recently started on a Clear Liquid diet recently. Will provide ONS with meals. Labs reviewed - K: 3.4 mmol/L - 10 mEq KCl replacement given. Malnutrition Assessment:  · Malnutrition Status: At risk for malnutrition  · Context: Acute illness or injury  · Findings of the 6 clinical characteristics of malnutrition (Minimum of 2 out of 6 clinical characteristics is required to make the diagnosis of moderate or severe Protein Calorie Malnutrition based on AND/ASPEN Guidelines):  1. Energy Intake-Less than or equal to 75% of estimated energy requirement, Greater than or equal to 1 month    2. Weight Loss-No significant weight loss  3. Fat Loss-Unable to assess  4. Muscle Loss-Unable to assess  5. Fluid Accumulation-No significant fluid accumulation    Nutrition Risk Level:  Moderate    Nutrient Needs:  · Estimated Daily Total Kcal: 5986-9886 kcal/day  · Estimated Daily Protein (g):  gm pro/day    Nutrition Diagnosis:   · Problem: Inadequate oral intake  · Etiology: related to Pain, Constipation, Appetite     Signs and symptoms:  as evidenced by Patient report of, Diet history of poor intake, recent start of Clear Liquid diet    Objective Information:  · Wound Type: None  · Current Nutrition Therapies:  · Oral Diet Orders: Clear Liquid   · Oral Diet intake: Diet recently advanced  · Oral Nutrition Supplement (ONS) Orders: None  · Anthropometric Measures:  · Ht: 5' 9\" (175.3 cm)   · Current Body Wt: 237 lb 7 oz (107.7 kg)  · Admission Body Wt: 237 lb 7 oz (107.7 kg)  · Ideal Body Wt: 159 lb 13.3 oz (72.5 kg), % Ideal Body 149%  · BMI Classification: BMI 35.0 - 39.9 Obese Class II    Nutrition Interventions:   Continue current diet, Start ONS - Provide Ensure Clear ONS. Monitor for diet advancement as appropriate/tolerated. Continued Inpatient Monitoring, Education Not Indicated    Nutrition Evaluation:   · Evaluation: Goals set   · Goals: Oral intakes to meet at least 75% of estimated nutrition needs.     · Monitoring: Nutrition Progression, Meal Intake, Supplement Intake, Diet Tolerance, Skin Integrity, Weight, Pertinent Labs, Constipation, Nausea or Vomiting    Electronically signed by Carla Talavera RD, LD on 4/12/19 at 3:34 PM    Contact Number: [phone]

## 2019-04-12 NOTE — PROGRESS NOTES
Smoking Cessation - topics covered   [x]  Health Risks  [x]  Benefits of Quitting   [x]  Smoking Cessation  []  Patient has no history of tobacco use  []  Patient is former smoker. []  No need for tobacco cessation education. [x]  Booklet given  [x]  Patient verbalizes understanding. []  Patient denies need for tobacco cessation education. []  Unable to meet with patient today. Will follow up as able.   Serena Vincent  2:49 PM

## 2019-04-12 NOTE — ED PROVIDER NOTES
[facility]  Emergency Department  Faculty Sign-Out Addendum     Care of Zane Jaramillo was assumed from previous attending and is being seen for Nausea; Constipation; and Back Pain  . The patient's initial evaluation and plan have been discussed with the prior provider who initially evaluated the patient. EMERGENCY DEPARTMENT COURSE / MEDICAL DECISION MAKING:       MEDICATIONS GIVEN:  Orders Placed This Encounter   Medications    ondansetron (ZOFRAN) injection 4 mg    promethazine (PHENERGAN) injection 6.25 mg    ketorolac (TORADOL) injection 15 mg    iohexol (OMNIPAQUE 350) solution 75 mL    potassium chloride 10 mEq/100 mL IVPB (Peripheral Line)    0.9 % sodium chloride bolus    milk and molasses enema 240 mL    ketorolac (TORADOL) injection 15 mg       LABS / RADIOLOGY:     Labs Reviewed   BASIC METABOLIC PANEL W/ REFLEX TO MG FOR LOW K - Abnormal; Notable for the following components:       Result Value    Glucose 142 (*)     Potassium 3.4 (*)     All other components within normal limits   HEPATIC FUNCTION PANEL - Abnormal; Notable for the following components: Total Bilirubin 0.25 (*)     All other components within normal limits   CBC WITH AUTO DIFFERENTIAL - Abnormal; Notable for the following components:    Hemoglobin 12.7 (*)     RDW 15.8 (*)     Immature Granulocytes 1 (*)     All other components within normal limits   MAGNESIUM   BRAIN NATRIURETIC PEPTIDE   LIPASE   URINALYSIS WITH MICROSCOPIC       Xr Chest Standard (2 Vw)    Result Date: 4/4/2019  EXAMINATION: TWO VIEWS OF THE CHEST 4/4/2019 10:22 am COMPARISON: 24 August 2017 HISTORY: ORDERING SYSTEM PROVIDED HISTORY: chest pain TECHNOLOGIST PROVIDED HISTORY: chest pain FINDINGS: Overlying cardiac monitoring electrodes are present. No cardiomegaly, vascular congestion effusion or pneumothorax is noted. Strand-like opacities are present at the lighting bases consistent with mild atelectasis.   Osseous and mediastinal structures are age-appropriate. Mild atelectasis. Otherwise unremarkable study. Ct Abdomen Pelvis W Iv Contrast Additional Contrast? None    Result Date: 4/11/2019  EXAMINATION: CT OF THE ABDOMEN AND PELVIS WITH CONTRAST 4/11/2019 9:57 pm TECHNIQUE: CT of the abdomen and pelvis was performed with the administration of intravenous contrast. Multiplanar reformatted images are provided for review. Dose modulation, iterative reconstruction, and/or weight based adjustment of the mA/kV was utilized to reduce the radiation dose to as low as reasonably achievable. COMPARISON: May 2015 HISTORY: ORDERING SYSTEM PROVIDED HISTORY: nausea and constipation for last 2 weeks despite 2 enemas. TECHNOLOGIST PROVIDED HISTORY: Ordering Physician Provided Reason for Exam: nausea x 2 weeks Acuity: Unknown Type of Exam: Unknown Additional signs and symptoms: constipation x 2 weeks FINDINGS: Lower Chest: Dependent atelectasis is noted. Bronchiectatic changes are noted. Vascular calcifications are noted. Small pericardial effusion is noted. Small hiatal hernia is noted. Organs: Too numerous to count low-density liver lesions are again noted. No gallstones are noted. No splenic or pancreatic lesions are noted. Slight adrenal gland fullness is noted, left greater than right. This is not grossly changed. Hyperplasia or adenoma is favored. Low-density left renal lesion is noted. This is likely a small cyst. GI/Bowel: Stool is noted throughout the colon. Mild small-bowel distention in the left side of the abdomen is noted with multiple air-fluid levels. There is no pneumatosis. There is no bowel wall thickening. Appendix is normal. Pelvis: Multiple small bowel loops are mildly dilated with fluid. Urinary bladder detail is significantly limited due to artifact. There is suggested wall thickening. Sigmoid redundancy is noted. There is no bowel wall thickening in the pelvis. Vascular calcifications are noted. Peritoneum/Retroperitoneum: Vascular calcifications are noted. There is no aneurysm. Scattered small lymph nodes are noted. There is no pathologic enlargement Bones/Soft Tissues: Postop changes in the right hip are noted give rise to artifact. Degenerative changes in the left hip are noted. SI joint degenerative changes and lumbar spine degenerative changes are noted. Disc bulging at L5-S1 is noted. Canal detail is limited     Too numerous to count low-density liver lesions are likely cysts. These are stable. Stable adrenal glands. Multifocal small-bowel distention with fluid levels suggesting ileus. Nonspecific enteritis is possible. Mesenteric vessels are patent. No bowel wall thickening. RECENT VITALS:     Temp: 98.7 °F (37.1 °C),  Pulse: 86, Resp: 20, BP: (!) 149/70, SpO2: 99 %    Carmelo Vogt is a 71 y.o. male who presents with complaint of nausea, decreased appetite and constipation. Patient found to have ileus and will be admitted for bowel prep. OUTSTANDING TASKS / RECOMMENDATIONS:    1.  Disposition made by previous attending and nothing to do      Azul Pereira DO  Attending Emergency Physician  [facility]       Azul Pereira [place]  04/12/19 [id number]

## 2019-04-12 NOTE — ED NOTES
Pt c/o nausea and constipation for a week now. Pt sts she was seen here a week ago for same and had a heart cath done. Pt denies any SOB or chest pain at this time. Pt also c/o \"throwing his back out\" right before coming to ED. Pt sts he was putting lotion on his legs and felt his \"back go out\". Pt denies any pain at this time. Pt on full monitor. Call light provided. Will continue to monitor.      Nahun Tracy RN  04/11/19 1930

## 2019-04-12 NOTE — ED PROVIDER NOTES
[facility] ED  eMERGENCY dEPARTMENT eNCOUnter  Resident    Pt Name: Meryle Hodgkins  MRN: [id number]  [place] 1950  Date of evaluation: 4/11/2019  PCP:  Alexys Tubbs, [address] [place] [address]       Chief Complaint   Patient presents with    Nausea    Constipation    Back Pain         HISTORY OF PRESENT ILLNESS    Meryle Hodgkins is a 71 y.o. male with past medical history of essential hypertension, esophageal stricture came to the emergency room for nausea, constipation and abdominal pain. Patient is having constipation for the last 2 week associated with  nausea and lower abdominal pain. Patient was seen here one week back complaining of nausea, epigastric pain and constipation. Stress test was concerning for ischemia, patient had cardiac cath which was unremarkable. Patient was discharged home. Per patient, he received 2 enemas at home but he did not had any bowel movement. Patient does not have any history of constipation. Patient denies taking start taking any new medication or herbal or over-the-counter pain. REVIEW OF SYSTEMS       Review of Systems   Constitutional: Negative for chills, fatigue, fever and unexpected weight change. HENT: Negative for congestion, drooling, ear discharge, mouth sores, rhinorrhea and sore throat. Eyes: Negative for discharge and itching. Respiratory: Negative for cough, choking, chest tightness and shortness of breath. Cardiovascular: Negative for chest pain, palpitations and leg swelling. Gastrointestinal: Positive for abdominal pain, constipation and nausea. Negative for abdominal distention, anal bleeding, blood in stool, diarrhea, rectal pain and vomiting. Endocrine: Negative for cold intolerance and heat intolerance. Genitourinary: Negative for decreased urine volume, dysuria, enuresis, flank pain, frequency, hematuria and urgency. Musculoskeletal: Negative for arthralgias, joint swelling and myalgias.    Neurological: Negative for dizziness, syncope, numbness and headaches. Hematological: Negative for adenopathy. PAST MEDICAL HISTORY    has a past medical history of Arthritis, BPH (benign prostatic hyperplasia), Candida esophagitis ([address] Esophageal hiatus hernia, GERD (gastroesophageal reflux disease), History of dysphagia, History of esophageal stricture, History of radiation therapy, Prostate cancer ([address] Sleep apnea, and Smoker. SURGICAL HISTORY      has a past surgical history that includes Endoscopy, colon, diagnostic; Colonoscopy; knee surgery (Right); Prostate biopsy (May 2015); Upper gastrointestinal endoscopy (2017); Upper gastrointestinal endoscopy (2017); pr esophagogastroduodenoscopy transoral diagnostic (N/A, 2017); pr esophagogastroduodenoscopy transoral diagnostic (3/17/2017); pr egd balloon dilation esophagus <30 mm diam (N/A, 2017); pr egd balloon dilation esophagus <30 mm diam (N/A, 2017); Upper gastrointestinal endoscopy (N/A, 2017); Upper gastrointestinal endoscopy (N/A, 2018); Colonoscopy (2018); Colonoscopy (2018); Upper gastrointestinal endoscopy (N/A, 2018); Upper gastrointestinal endoscopy (N/A, 2018); Upper gastrointestinal endoscopy (2018); Total hip arthroplasty (Right); and Upper gastrointestinal endoscopy (N/A, 1/15/2019). CURRENT MEDICATIONS       Previous Medications    AMLODIPINE (NORVASC) 10 MG TABLET    Take 1 tablet by mouth daily    OMEPRAZOLE (PRILOSEC) 40 MG DELAYED RELEASE CAPSULE    TAKE 1 CAPSULE ONE TIME DAILY  45 TO 60  MINUTES  BEFORE  FIRST  MEAL OF THE DAY       ALLERGIES     has No Known Allergies. FAMILY HISTORY     indicated that his mother is . He indicated that his father is . He indicated that his sister is alive. family history includes High Blood Pressure in his father; Other in his mother. SOCIAL HISTORY      reports that he has been smoking cigarettes.   He has a 30.00 pack-year smoking history. He has never used smokeless tobacco. He reports that he does not drink alcohol or use drugs. PHYSICAL EXAM     INITIAL VITALS:  height is 5' 9.5\" (1.765 m) and weight is 230 lb (104.3 kg). His oral temperature is 98.7 °F (37.1 °C). His blood pressure is 149/70 (abnormal) and his pulse is 86. His respiration is 20 and oxygen saturation is 99%. Physical Examination: General appearance - alert, well appearing, and in no distress  Chest - clear to auscultation, no wheezes, rales or rhonchi, symmetric air entry  Heart - normal rate, regular rhythm, normal S1, S2, no murmurs, rubs, clicks or gallops  Abdomen - soft, mild lower abdominal tenderness, nondistended, hypoactive bowel sounds, no masses or organomegaly  Back exam - full range of motion, no tenderness, palpable spasm or pain on motion  Neurological - alert, oriented, normal speech, no focal findings or movement disorder noted  Musculoskeletal - no joint tenderness, deformity or swelling  Extremities - peripheral pulses normal, no pedal edema, no clubbing or cyanosis  Skin - normal coloration and turgor, no rashes, no suspicious skin lesions noted      DIFFERENTIAL DIAGNOSIS/MDM:     Small bowel obstruction. Ileus. Constipation. DIAGNOSTIC RESULTS     EKG: All EKG's are interpreted by the Emergency Department Physician who either signs or Co-signs this chart in the absence of a cardiologist.        RADIOLOGY:   I directly visualized the following  images and reviewed the radiologist interpretations:  CT ABDOMEN PELVIS W IV CONTRAST Additional Contrast? None   Final Result   Too numerous to count low-density liver lesions are likely cysts. These are   stable. Stable adrenal glands. Multifocal small-bowel distention with fluid levels suggesting ileus. Nonspecific enteritis is possible. Mesenteric vessels are patent. No bowel wall thickening.              LABS:  Labs Reviewed   BASIC METABOLIC PANEL W/ REFLEX TO MG FOR LOW K - Abnormal; Notable for the following components:       Result Value    Glucose 142 (*)     Potassium 3.4 (*)     All other components within normal limits   HEPATIC FUNCTION PANEL - Abnormal; Notable for the following components: Total Bilirubin 0.25 (*)     All other components within normal limits   MAGNESIUM   BRAIN NATRIURETIC PEPTIDE   LIPASE   URINALYSIS WITH MICROSCOPIC           EMERGENCY DEPARTMENT COURSE:   Vitals:    Vitals:    04/11/19 2025 04/11/19 2027   BP:  (!) 149/70   Pulse:  86   Resp: 20    Temp: 98.7 °F (37.1 °C)    TempSrc: Oral    SpO2:  99%   Weight: 230 lb (104.3 kg)    Height: 5' 9.5\" (1.765 m)        CONSULTS:  IP CONSULT TO GENERAL SURGERY      Patient came to the emergency room for constipation and nausea for the last 2 weeks. BMP and LFTs were unremarkable except potassium 3.4. CT abdomen pelvis with contrast showed multiple small bowel air fluid level possible ileus and multiple small liver cysts. Patient started on IV hydration and potassium replacement. General surgery consulted. They recommended to give enema. If patient have bowel movement and feel better patient can be discharged. NG tube replaced if patient starts to get vomiting. If patient does not get any bowel movement after enema he can be admitted. FINAL IMPRESSION      1. Ileus [facility])            DISPOSITION/PLAN   DISPOSITION     Signed out to Dr. Mirela Hurley. PATIENT REFERRED TO:  No follow-up provider specified.     DISCHARGE MEDICATIONS:  New Prescriptions    No medications on file       (Please note that portions of this note were completed with a voice recognition program.  Efforts were made to edit the dictations but occasionally words are mis-transcribed.)    Aftab Scott  Emergency Medicine Resident            Aftab Scott MD  04/12/19 1996

## 2019-04-12 NOTE — CONSULTS
General Surgery:    Consult Note        PATIENT NAME: Yane North   YOB: 1950    ADMISSION DATE: 4/11/2019  8:15 PM     Admitting Provider: ED    Consulted Physician: Dr. Idalia Dorado DATE: 4/12/2019    Chief Complaint:  Abd pain, obstipation   Consult Regarding:  above    HISTORY OF PRESENT ILLNESS:  The patient is a 71 y.o. male  who presented on 4/11/19 to ED with complaints of abd pain and obstipation for past 2 weeks. Pt was seen a week prior at [facility] for \"constipation\" and ultimately underwent a heart cath with no need for PCI or stents. Pt discharge and has still cont to have mild abd pain and no BM or flatus. Pt denies any sick contacts, f/c, emesis, sob, chest pain. No recent diarrhea. No change in dietary  habits. Pt has Hx of prostate cancer s/p radiation therapy. Pt admits to c-scope last year with Dr. Mari Aviles and had a couple polypectomies but otherwise no need for follow up for another 5 years. Pt denies any significant Hx of constipation prior to these past 2 weeks. Pt normal has a non bloody BM daily with no straining.      Past Medical History:        Diagnosis Date    Arthritis     BPH (benign prostatic hyperplasia)     Candida esophagitis ([facility] 03/2017    Esophageal hiatus hernia     GERD (gastroesophageal reflux disease)     History of dysphagia     several EGDs with dilation    History of esophageal stricture     History of radiation therapy     for prostate cancer    Prostate cancer ([facility] 04/2017    states scheduled for surgery in the next month    Sleep apnea     no machine, says uses nasal pillows     Smoker        Past Surgical History:        Procedure Laterality Date    COLONOSCOPY      COLONOSCOPY  5/29/2018    COLONOSCOPY POLYPECTOMY SNARE/COLD BIOPSY performed by Chaparrita Wang MD at  [facility] [address] COLONOSCOPY  5/29/2018    COLONOSCOPY WITH BIOPSY performed by Chaparrita Wang MD at [place] [address], COLON, DIAGNOSTIC      KNEE SURGERY Right     meniscus    ME EGD BALLOON DILATION ESOPHAGUS <30 MM DIAM N/A 2017    EGD ESOPHAGOGASTRODUODENOSCOPY DILATATION performed by Joshua Mason MD at [facility]    ME  [address] <30 MM DIAM N/A 2017    EGD ESOPHAGOGASTRODUODENOSCOPY DILATATION performed by Joshua Mason MD at [facility]    ME ESOPHAGOGASTRODUODENOSCOPY TRANSORAL DIAGNOSTIC N/A 2017    EGD ESOPHAGOGASTRODUODENOSCOPY  AND PEDIATRIC SCOPE performed by Jaleesa Hedrick MD at [address] ESOPHAGOGASTRODUODENOSCOPY TRANSORAL DIAGNOSTIC  3/17/2017    EGD ESOPHAGOGASTRODUODENOSCOPY performed by [facility] MD at [facility]  May 2015    TOTAL HIP ARTHROPLASTY Right     UPPER GASTROINTESTINAL ENDOSCOPY  2017    egd with dilation    UPPER GASTROINTESTINAL ENDOSCOPY  2017    UPPER GASTROINTESTINAL ENDOSCOPY N/A 2017    EGD ESOPHAGOGASTRODUODENOSCOPY WITH DILATION SAVORY DILATORS 14-15-16MM performed by Joshua Mason MD at [address] [place] [address] N/A 2018    EGD DIAGNOSTIC ONLY performed by Joshua Mason MD at [address] [place] [address] N/A 2018    EGD DILATION SAVORY performed by Joshua Mason MD at [address] [place] [address] N/A 2018    EGD BIOPSY performed by Joshua Mason MD at [address] [place] [address]  2018    EGD DILATION SAVORY performed by Joshua Mason MD at [address] [place] [address] N/A 1/15/2019    EGD DILATION SAVORY performed by Jaleesa Hedrick MD at [facility]       Medications Prior to Admission:   Not in a hospital admission. Allergies:  Patient has no known allergies.     Social History:   Social History     Socioeconomic History    Marital status:      Spouse name: Not on file    Number of children: Not on file    Years of education: Not on file    Highest education level: Not on file   Occupational History    Not on file   Social Needs    Financial resource strain: Not on file    Food insecurity:     Worry: Not on file     Inability: Not on file    Transportation needs:     Medical: Not on file     Non-medical: Not on file   Tobacco Use    Smoking status: Current Every Day Smoker     Packs/day: 0.75     Years: 40.00     Pack years: 30.00     Types: Cigarettes    Smokeless tobacco: Never Used   Substance and Sexual Activity    Alcohol use: No     Alcohol/week: 0.0 oz    Drug use: No    Sexual activity: Not on file   Lifestyle    Physical activity:     Days per week: Not on file     Minutes per session: Not on file    Stress: Not on file   Relationships    Social connections:     Talks on phone: Not on file     Gets together: Not on file     Attends Quaker service: Not on file     Active member of club or organization: Not on file     Attends meetings of clubs or organizations: Not on file     Relationship status: Not on file    Intimate partner violence:     Fear of current or ex partner: Not on file     Emotionally abused: Not on file     Physically abused: Not on file     Forced sexual activity: Not on file   Other Topics Concern    Not on file   Social History Narrative    Not on file       Family History:       Problem Relation Age of Onset    Other Mother         breathing problems    High Blood Pressure Father        REVIEW OF SYSTEMS:    CONSTITUTIONAL:  No recent weight gain/loss. Energy level normal for pt. HEENT:  No nasal congestion or drainage. CARDIOVASCULAR:  No chest pain  GASTROINTESTINAL:  + mild abdominal pain, + nausea, no vomiting.  +constipation,   No rectal bleeding  GENITOURINARY: no dysuria, frequency  HEMATOLOGIC/LYMPHATIC:  No easy bruising.  + prostate cancer Hx  ENDOCRINE: negative  Review of systems negative unless listed above.     PHYSICAL EXAM:    VITALS:  BP (!) 149/70 Pulse 86   Temp 98.7 °F (37.1 °C) (Oral)   Resp 20   Ht 5' 9.5\" (1.765 m)   Wt 230 lb (104.3 kg)   SpO2 99%   BMI 33.48 kg/m²   INTAKE/OUTPUT:   No intake or output data in the 24 hours ending 04/12/19 0005    CONSTITUTIONAL:  awake, alert, not distressed   ENT:  normocephalic/atraumatic, without obvious abnormality  NECK:  supple, symmetrical, trachea midline   LUNGS:  CTA bilaterally  CARDIOVASCULAR:  regular rate and rhythm  ABDOMEN: soft, non-distended, non-tender to palpation, obese. no guarding. No Scars, no rebound, no flank pain, no masses   Rectal:  Normal rectal tone, no stool in vault  SKIN:  No drainage, induration, or erythema noted.    MUSCULOSKELETAL:  Negative,  NEUROLOGIC:  Mental Status Exam:  Level of Alertness:   alert  Orientation:   oriented to person, place, and time    CBC with Differential:    Lab Results   Component Value Date    WBC 8.2 04/11/2019    RBC 4.59 04/11/2019    HGB 12.7 04/11/2019    HCT 41.5 04/11/2019     04/11/2019    MCV 90.4 04/11/2019    MCH 27.7 04/11/2019    MCHC 30.6 04/11/2019    RDW 15.8 04/11/2019    LYMPHOPCT 28 04/11/2019    MONOPCT 8 04/11/2019    MYELOPCT 1 08/25/2017    BASOPCT 1 04/11/2019    MONOSABS 0.65 04/11/2019    LYMPHSABS 2.28 04/11/2019    EOSABS 0.20 04/11/2019    BASOSABS 0.05 04/11/2019    DIFFTYPE NOT REPORTED 04/11/2019     CMP:    Lab Results   Component Value Date     04/11/2019    K 3.4 04/11/2019     04/11/2019    CO2 23 04/11/2019    BUN 11 04/11/2019    CREATININE 1.01 04/11/2019    GFRAA >60 04/11/2019    LABGLOM >60 04/11/2019    GLUCOSE 142 04/11/2019    PROT 7.4 04/11/2019    LABALBU 4.3 04/11/2019    CALCIUM 9.0 04/11/2019    BILITOT 0.25 04/11/2019    ALKPHOS 87 04/11/2019    AST 11 04/11/2019    ALT 9 04/11/2019       Pertinent Radiology:   Ct Abdomen Pelvis W Iv Contrast Additional Contrast? None    Result Date: 4/11/2019  EXAMINATION: CT OF THE ABDOMEN AND PELVIS WITH CONTRAST 4/11/2019 9:57 pm TECHNIQUE: CT of the abdomen and pelvis was performed with the administration of intravenous contrast. Multiplanar reformatted images are provided for review. Dose modulation, iterative reconstruction, and/or weight based adjustment of the mA/kV was utilized to reduce the radiation dose to as low as reasonably achievable. COMPARISON: May 2015 HISTORY: ORDERING SYSTEM PROVIDED HISTORY: nausea and constipation for last 2 weeks despite 2 enemas. TECHNOLOGIST PROVIDED HISTORY: Ordering Physician Provided Reason for Exam: nausea x 2 weeks Acuity: Unknown Type of Exam: Unknown Additional signs and symptoms: constipation x 2 weeks FINDINGS: Lower Chest: Dependent atelectasis is noted. Bronchiectatic changes are noted. Vascular calcifications are noted. Small pericardial effusion is noted. Small hiatal hernia is noted. Organs: Too numerous to count low-density liver lesions are again noted. No gallstones are noted. No splenic or pancreatic lesions are noted. Slight adrenal gland fullness is noted, left greater than right. This is not grossly changed. Hyperplasia or adenoma is favored. Low-density left renal lesion is noted. This is likely a small cyst. GI/Bowel: Stool is noted throughout the colon. Mild small-bowel distention in the left side of the abdomen is noted with multiple air-fluid levels. There is no pneumatosis. There is no bowel wall thickening. Appendix is normal. Pelvis: Multiple small bowel loops are mildly dilated with fluid. Urinary bladder detail is significantly limited due to artifact. There is suggested wall thickening. Sigmoid redundancy is noted. There is no bowel wall thickening in the pelvis. Vascular calcifications are noted. Peritoneum/Retroperitoneum: Vascular calcifications are noted. There is no aneurysm. Scattered small lymph nodes are noted. There is no pathologic enlargement Bones/Soft Tissues: Postop changes in the right hip are noted give rise to artifact. Degenerative changes in the left hip are noted. SI joint degenerative changes and lumbar spine degenerative changes are noted. Disc bulging at L5-S1 is noted. Canal detail is limited     Too numerous to count low-density liver lesions are likely cysts. These are stable. Stable adrenal glands. Multifocal small-bowel distention with fluid levels suggesting ileus. Nonspecific enteritis is possible. Mesenteric vessels are patent. No bowel wall thickening. ASSESSMENT:  There are no active hospital problems to display for this patient. 71 y.o. male with 2 week Hx of obstipation and CT consistent with signs of ileus   S/p cardiac cath 4/5 revealed mild non obstructive CAD  Hx prostate cancer s/p radiation   Hypokalemia     Plan:  1. Continue medical mgmt and supportive care per primary   2. Recommend NPO for and if pt has emesis place NGT to LIWS, if pt has BM and nausea improves recommend diet and addition of oral bowel regimen   3. Milk of molasses enema  4. Replace K+ and other electrolytes PRN  5. F/U enema and abd discomfort, if improved likely d/c home with aggressive bowel regimen of daily colace/senna and miralax  6. Contact GI if continued concerns for GI workup   7. No acute general surgery intervention at this time. Contact as needed     Discussed with Dr. Armijo All     Electronically signed by Vee Chaudhry DO  on 4/12/2019 at 12:05 AM               Trauma Attending Attestation      I have reviewed the above [facility] note(s) and confirmed the key elements of the medical history and physical exam. I have seen and examined the pt. I have discussed the findings, established the care plan and recommendations with Resident, [facility]CS RN, bedside nurse.           Purvi Moreno DO  4/12/2019  4:53 PM

## 2019-04-12 NOTE — CARE COORDINATION
Case Management Initial Discharge Plan  Jodi Britt,             Met with:patient to discuss discharge plans. Information verified: address, contacts, phone number, , insurance Yes  PCP: Hima Cueto DO  Date of last visit: >1 year    Insurance Provider: Medicare, [employer]    Discharge Planning    Living Arrangements:  Children, Parent   Support Systems:  Children, Spouse/Significant Other    Home has 2 stories  7 stairs to climb to get into front door, 1 flight stairs to climb to reach second floor  Location of bedroom/bathroom in home second story    Patient able to perform ADL's:Independent    Current Services (outpatient & in home) DME  DME equipment: CPAP  DME provider:      Pharmacy: [facility] at [address] [place] [address] Medications:  No  Does patient want to participate in local refill/ meds to beds program?  Yes    Potential Assistance Needed:  N/A    Patient agreeable to home care: No  [place] of choice provided:  n/a    Prior SNF/Rehab Placement and Facility: No  Agreeable to SNF/Rehab: No  [place] of choice provided: n/a   Evaluation: n/a    Expected Discharge date:  04/15/19  Patient expects to be discharged to:  home  Follow Up Appointment: Best Day/ Time: Monday AM    Transportation provider: self or spouse  Transportation arrangements needed for discharge: No     Readmission Risk              Risk of Unplanned Readmission:        12             Does patient have a readmission risk score greater than 14?: No  If yes, follow-up appointment must be made within 7 days of discharge.      Discharge Plan: Home with spouse          Electronically signed by Sherin Sanderson RN on 19 at 1:07 PM

## 2019-04-12 NOTE — PROGRESS NOTES
PROGRESS NOTE          PATIENT NAME: Randee Hernandez  MEDICAL RECORD NO. [id number]  DATE: 2019  SURGEON: Dr. Bri Morrow: Willis Cockayne,     HD: # 0    ASSESSMENT    Patient Active Problem List   Diagnosis    BPH (benign prostatic hyperplasia)    Esophageal stricture    S/P TURP    Chest pain    Ileus [facility])       MEDICAL DECISION MAKING AND PLAN    1. Continue medical mgmt and supportive care per primary  2. Recommend NPO, if pt has emesis place NGT to LIWS, once nausea improves recommend diet and oral bowel regimen. 3. Milk of Magnesia  4. Continue electrolyte replacement PRN  5. If GI does not require further workup, d/c home with aggressive bowel regimen of daily colace/senna and miralax. 6. No acute general surgery intervention at this time. Contact as needed      SUBJECTIVE    Randee Hernandez has improved since yesterday. Passing flatus and BM x1. Continued nausea but no abdominal or flank pain. Unable to eat due to nausea. OBJECTIVE  VITALS: Temp: Temp: 98.7 °F (37.1 °C)Temp  Av.7 °F (37.1 °C)  Min: 98.7 °F (37.1 °C)  Max: 98.7 °F (19.0 °C) BP Systolic (97RLX), KMH:877 , Min:149 , YH   Diastolic (53WDE), AUNG:07, Min:70, Max:90   Pulse Pulse  Av  Min: 72  Max: 86 Resp Resp  Av  Min: 20  Max: 20 Pulse ox SpO2  Av.5 %  Min: 98 %  Max: 99 %  GENERAL: alert, cooperative, no distress  HEENT: No conjunctival pallor  : deferred  LUNGS: clear to ausculation, without wheezes, rales or rhonci  HEART: normal rate and regular rhythm  ABDOMEN: soft, non-tender, non-distended, bowel sounds present in all 4 quadrants and no guarding or peritoneal signs present  EXTREMITY: no cyanosis, clubbing or edema    No intake/output data recorded. Drain/tube output:   In: 1100   Out: -     LAB:  CBC:   Recent Labs     19   WBC 8.2   HGB 12.7*   HCT 41.5   MCV 90.4        BMP:   Recent Labs     19      K 3.4*      CO2 23 BUN 11   CREATININE 1.01   GLUCOSE 142*     COAGS:   Recent Labs     04/11/19 2050   PROT 7.4             Bernie Tee DO  4/12/19, 6:44 AM

## 2019-04-12 NOTE — ED PROVIDER NOTES
[facility]ia Fuentes ONC/MED SURG  Emergency Department  Emergency Medicine Resident Sign-out     Care of Irvin Glass was assumed from Dr. Luis Willard and is being seen for Nausea; Constipation; and Back Pain  . The patient's initial evaluation and plan have been discussed with the prior provider who initially evaluated the patient. EMERGENCY DEPARTMENT COURSE / MEDICAL DECISION MAKING:       MEDICATIONS GIVEN:  Orders Placed This Encounter   Medications    ondansetron (ZOFRAN) injection 4 mg    promethazine (PHENERGAN) injection 6.25 mg    ketorolac (TORADOL) injection 15 mg    iohexol (OMNIPAQUE 350) solution 75 mL    potassium chloride 10 mEq/100 mL IVPB (Peripheral Line)    0.9 % sodium chloride bolus    milk and molasses enema 240 mL    ketorolac (TORADOL) injection 15 mg    ondansetron (ZOFRAN) injection 4 mg    dextrose 5 % and 0.45 % NaCl with KCl 20 mEq infusion    sodium chloride flush 0.9 % injection 10 mL    sodium chloride flush 0.9 % injection 10 mL    OR Linked Order Group     potassium chloride (KLOR-CON M) extended release tablet 40 mEq     potassium bicarb-citric acid (EFFER-K) effervescent tablet 40 mEq     potassium chloride 10 mEq/100 mL IVPB (Peripheral Line)    magnesium sulfate 1 g in dextrose 5% 100 mL IVPB    magnesium hydroxide (MILK OF MAGNESIA) 400 MG/5ML suspension 30 mL    ondansetron (ZOFRAN) injection 4 mg    famotidine (PEPCID) injection 20 mg    nicotine (NICODERM CQ) 21 MG/24HR 1 patch     As needed if a smoker and requested by patient.  enoxaparin (LOVENOX) injection 40 mg    acetaminophen (TYLENOL) tablet 650 mg       LABS / RADIOLOGY:     Labs Reviewed   BASIC METABOLIC PANEL W/ REFLEX TO MG FOR LOW K - Abnormal; Notable for the following components:       Result Value    Glucose 142 (*)     Potassium 3.4 (*)     All other components within normal limits   HEPATIC FUNCTION PANEL - Abnormal; Notable for the following components:     Total Bilirubin 0.25 (*) noted.  No gallstones are noted. No splenic or pancreatic lesions are noted. Slight adrenal gland fullness is noted, left greater than right. This is not grossly changed. Hyperplasia or adenoma is favored. Low-density left renal lesion is noted. This is likely a small cyst. GI/Bowel: Stool is noted throughout the colon. Mild small-bowel distention in the left side of the abdomen is noted with multiple air-fluid levels. There is no pneumatosis. There is no bowel wall thickening. Appendix is normal. Pelvis: Multiple small bowel loops are mildly dilated with fluid. Urinary bladder detail is significantly limited due to artifact. There is suggested wall thickening. Sigmoid redundancy is noted. There is no bowel wall thickening in the pelvis. Vascular calcifications are noted. Peritoneum/Retroperitoneum: Vascular calcifications are noted. There is no aneurysm. Scattered small lymph nodes are noted. There is no pathologic enlargement Bones/Soft Tissues: Postop changes in the right hip are noted give rise to artifact. Degenerative changes in the left hip are noted. SI joint degenerative changes and lumbar spine degenerative changes are noted. Disc bulging at L5-S1 is noted. Canal detail is limited     Too numerous to count low-density liver lesions are likely cysts. These are stable. Stable adrenal glands. Multifocal small-bowel distention with fluid levels suggesting ileus. Nonspecific enteritis is possible. Mesenteric vessels are patent. No bowel wall thickening. RECENT VITALS:     Temp: 98.7 °F (37.1 °C),  Pulse: 72, Resp: 20, BP: (!) 155/90, SpO2: 98 %    This patient is a 71 y.o. Male with constipation w/ no bowel movement over the last 2 weeks, with associted nausea. Was here 1 week ago w/ nausea and epigatric pain. Had a stress test that was concerning for ischemia, cardiac cath was negative for obstructive disease. On this presentation, CT abdomen and pelvis consistent with ileus. General surgery recommending enema which has been ordered but not yet given. If no bowel movement recommending adm to ETU vs internal medicine. Also hypokalemic- this was replaced    Patient has received milk of molasses enema with only very small bowel movement. Still with nausea. Received a second dose of Zofran at 2 AM, also received another dose of Toradol. As patient continues to have symptoms and he has not had an adequate bowel movement will admit patient to [facility] for further management of his ileus. Patient admitted to [facility]. No significant nausea, pain is well controlled. Patient transferred to floor in stable condition. OUTSTANDING TASKS / RECOMMENDATIONS:    1. F/u bowel movement  2. dispo pending BM     FINAL IMPRESSION:     1.  Ileus [facility])      DISPOSITION:         DISPOSITION:  []  Discharge   []  Transfer -    [x]  Admission -  [facility]   []  Against Medical Advice   []  Eloped   FOLLOW-UP: Emerita Middleton DO  [address] [place] [address]  [phone]           DISCHARGE MEDICATIONS: Current Discharge Medication List           Elgin Tripp DO  Emergency Medicine Resident  [facility] [place]  04/12/19 3469

## 2019-04-12 NOTE — PLAN OF CARE
Problem:  Bowel/Gastric:  Goal: Control of bowel function will improve  Description  Control of bowel function will improve  Outcome: Ongoing  Goal: Ability to achieve a regular elimination pattern will improve  Description  Ability to achieve a regular elimination pattern will improve  Outcome: Ongoing     Problem: Nutritional:  Goal: Ability to follow a diet with enough fiber (20 to 30 grams) for normal bowel function will improve  Description  Ability to follow a diet with enough fiber (20 to 30 grams) for normal bowel function will improve  Outcome: Ongoing     Problem: Skin Integrity:  Goal: Risk for impaired skin integrity will decrease  Description  Risk for impaired skin integrity will decrease  Outcome: Ongoing     Problem: Pain:  Goal: Pain level will decrease  Description  Pain level will decrease  Outcome: Ongoing  Goal: Control of acute pain  Description  Control of acute pain  Outcome: Ongoing  Goal: Control of chronic pain  Description  Control of chronic pain  Outcome: Ongoing

## 2019-04-13 VITALS
BODY MASS INDEX: 35.55 KG/M2 | SYSTOLIC BLOOD PRESSURE: 157 MMHG | TEMPERATURE: 98.2 F | RESPIRATION RATE: 16 BRPM | HEIGHT: 69 IN | OXYGEN SATURATION: 98 % | WEIGHT: 240 LBS | DIASTOLIC BLOOD PRESSURE: 58 MMHG | HEART RATE: 60 BPM

## 2019-04-13 PROBLEM — E83.51 HYPOCALCEMIA: Status: ACTIVE | Noted: 2019-04-13

## 2019-04-13 LAB
-: ABNORMAL
ALBUMIN SERPL-MCNC: 3.5 G/DL (ref 3.5–5.2)
ALBUMIN/GLOBULIN RATIO: 1.2 (ref 1–2.5)
ALP BLD-CCNC: 77 U/L (ref 40–129)
ALT SERPL-CCNC: 10 U/L (ref 5–41)
AMORPHOUS: ABNORMAL
AMYLASE: 53 U/L (ref 28–100)
ANION GAP SERPL CALCULATED.3IONS-SCNC: 11 MMOL/L (ref 9–17)
AST SERPL-CCNC: 19 U/L
BACTERIA: ABNORMAL
BILIRUB SERPL-MCNC: 0.26 MG/DL (ref 0.3–1.2)
BILIRUBIN URINE: NEGATIVE
BUN BLDV-MCNC: 8 MG/DL (ref 8–23)
BUN/CREAT BLD: ABNORMAL (ref 9–20)
CALCIUM SERPL-MCNC: 8.5 MG/DL (ref 8.6–10.4)
CASTS UA: ABNORMAL /LPF (ref 0–8)
CHLORIDE BLD-SCNC: 107 MMOL/L (ref 98–107)
CO2: 21 MMOL/L (ref 20–31)
COLOR: YELLOW
CREAT SERPL-MCNC: 0.86 MG/DL (ref 0.7–1.2)
CRYSTALS, UA: ABNORMAL /HPF
EPITHELIAL CELLS UA: ABNORMAL /HPF (ref 0–5)
GFR AFRICAN AMERICAN: >60 ML/MIN
GFR NON-AFRICAN AMERICAN: >60 ML/MIN
GFR SERPL CREATININE-BSD FRML MDRD: ABNORMAL ML/MIN/{1.73_M2}
GFR SERPL CREATININE-BSD FRML MDRD: ABNORMAL ML/MIN/{1.73_M2}
GLUCOSE BLD-MCNC: 113 MG/DL (ref 70–99)
GLUCOSE URINE: NEGATIVE
HCT VFR BLD CALC: 38.8 % (ref 40.7–50.3)
HEMOGLOBIN: 11.6 G/DL (ref 13–17)
KETONES, URINE: NEGATIVE
LEUKOCYTE ESTERASE, URINE: NEGATIVE
LIPASE: 22 U/L (ref 13–60)
MAGNESIUM: 2.3 MG/DL (ref 1.6–2.6)
MCH RBC QN AUTO: 27.8 PG (ref 25.2–33.5)
MCHC RBC AUTO-ENTMCNC: 29.9 G/DL (ref 28.4–34.8)
MCV RBC AUTO: 92.8 FL (ref 82.6–102.9)
MUCUS: ABNORMAL
NITRITE, URINE: NEGATIVE
NRBC AUTOMATED: 0 PER 100 WBC
OTHER OBSERVATIONS UA: ABNORMAL
PDW BLD-RTO: 15.5 % (ref 11.8–14.4)
PH UA: 7 (ref 5–8)
PHOSPHORUS: 2.9 MG/DL (ref 2.5–4.5)
PLATELET # BLD: 221 K/UL (ref 138–453)
PMV BLD AUTO: 10.6 FL (ref 8.1–13.5)
POTASSIUM SERPL-SCNC: 3.9 MMOL/L (ref 3.7–5.3)
PROTEIN UA: ABNORMAL
RBC # BLD: 4.18 M/UL (ref 4.21–5.77)
RBC UA: ABNORMAL /HPF (ref 0–4)
RENAL EPITHELIAL, UA: ABNORMAL /HPF
SODIUM BLD-SCNC: 139 MMOL/L (ref 135–144)
SPECIFIC GRAVITY UA: 1.02 (ref 1–1.03)
TOTAL PROTEIN: 6.4 G/DL (ref 6.4–8.3)
TRICHOMONAS: ABNORMAL
TURBIDITY: CLEAR
URINE HGB: ABNORMAL
UROBILINOGEN, URINE: NORMAL
WBC # BLD: 6.3 K/UL (ref 3.5–11.3)
WBC UA: ABNORMAL /HPF (ref 0–5)
YEAST: ABNORMAL

## 2019-04-13 PROCEDURE — 85027 COMPLETE CBC AUTOMATED: CPT

## 2019-04-13 PROCEDURE — 2500000003 HC RX 250 WO HCPCS: Performed by: NURSE PRACTITIONER

## 2019-04-13 PROCEDURE — 6370000000 HC RX 637 (ALT 250 FOR IP): Performed by: INTERNAL MEDICINE

## 2019-04-13 PROCEDURE — 82150 ASSAY OF AMYLASE: CPT

## 2019-04-13 PROCEDURE — 6360000002 HC RX W HCPCS: Performed by: NURSE PRACTITIONER

## 2019-04-13 PROCEDURE — 81001 URINALYSIS AUTO W/SCOPE: CPT

## 2019-04-13 PROCEDURE — 84100 ASSAY OF PHOSPHORUS: CPT

## 2019-04-13 PROCEDURE — 83735 ASSAY OF MAGNESIUM: CPT

## 2019-04-13 PROCEDURE — 2500000003 HC RX 250 WO HCPCS: Performed by: INTERNAL MEDICINE

## 2019-04-13 PROCEDURE — 83690 ASSAY OF LIPASE: CPT

## 2019-04-13 PROCEDURE — 99238 HOSP IP/OBS DSCHRG MGMT 30/<: CPT | Performed by: INTERNAL MEDICINE

## 2019-04-13 PROCEDURE — 36415 COLL VENOUS BLD VENIPUNCTURE: CPT

## 2019-04-13 PROCEDURE — 80053 COMPREHEN METABOLIC PANEL: CPT

## 2019-04-13 RX ORDER — POLYETHYLENE GLYCOL 3350 17 G/17G
17 POWDER, FOR SOLUTION ORAL DAILY
Status: DISCONTINUED | OUTPATIENT
Start: 2019-04-13 | End: 2019-04-13 | Stop reason: HOSPADM

## 2019-04-13 RX ORDER — CALCIUM CARBONATE 200(500)MG
1000 TABLET,CHEWABLE ORAL ONCE
Status: DISCONTINUED | OUTPATIENT
Start: 2019-04-13 | End: 2019-04-13 | Stop reason: HOSPADM

## 2019-04-13 RX ORDER — BISACODYL 10 MG
10 SUPPOSITORY, RECTAL RECTAL DAILY PRN
Status: DISCONTINUED | OUTPATIENT
Start: 2019-04-13 | End: 2019-04-13 | Stop reason: HOSPADM

## 2019-04-13 RX ORDER — ONDANSETRON 4 MG/1
4 TABLET, FILM COATED ORAL EVERY 8 HOURS PRN
Qty: 20 TABLET | Refills: 1 | Status: ON HOLD | OUTPATIENT
Start: 2019-04-13 | End: 2019-04-22

## 2019-04-13 RX ORDER — PANTOPRAZOLE SODIUM 40 MG/1
40 TABLET, DELAYED RELEASE ORAL
Status: DISCONTINUED | OUTPATIENT
Start: 2019-04-14 | End: 2019-04-13 | Stop reason: HOSPADM

## 2019-04-13 RX ORDER — AMLODIPINE BESYLATE 10 MG/1
10 TABLET ORAL DAILY
Status: DISCONTINUED | OUTPATIENT
Start: 2019-04-13 | End: 2019-04-13 | Stop reason: HOSPADM

## 2019-04-13 RX ORDER — POLYETHYLENE GLYCOL 3350 17 G/17G
17 POWDER, FOR SOLUTION ORAL DAILY
Qty: 527 G | Refills: 1 | Status: ON HOLD | OUTPATIENT
Start: 2019-04-13 | End: 2019-04-22

## 2019-04-13 RX ADMIN — POLYETHYLENE GLYCOL 3350 17 G: 17 POWDER, FOR SOLUTION ORAL at 11:25

## 2019-04-13 RX ADMIN — BISACODYL 10 MG: 10 SUPPOSITORY RECTAL at 18:45

## 2019-04-13 RX ADMIN — METOPROLOL TARTRATE 5 MG: 1 INJECTION, SOLUTION INTRAVENOUS at 03:01

## 2019-04-13 RX ADMIN — FAMOTIDINE 20 MG: 10 INJECTION, SOLUTION INTRAVENOUS at 08:57

## 2019-04-13 RX ADMIN — ONDANSETRON 4 MG: 2 INJECTION INTRAMUSCULAR; INTRAVENOUS at 18:49

## 2019-04-13 RX ADMIN — ONDANSETRON 4 MG: 2 INJECTION INTRAMUSCULAR; INTRAVENOUS at 09:36

## 2019-04-13 RX ADMIN — POTASSIUM CHLORIDE, DEXTROSE MONOHYDRATE AND SODIUM CHLORIDE: 150; 5; 450 INJECTION, SOLUTION INTRAVENOUS at 02:42

## 2019-04-13 RX ADMIN — ENOXAPARIN SODIUM 40 MG: 40 INJECTION SUBCUTANEOUS at 08:57

## 2019-04-13 RX ADMIN — AMLODIPINE BESYLATE 10 MG: 10 TABLET ORAL at 11:25

## 2019-04-13 ASSESSMENT — ENCOUNTER SYMPTOMS
ABDOMINAL PAIN: 0
CONSTIPATION: 1
VOMITING: 0
SHORTNESS OF BREATH: 0
COUGH: 0
NAUSEA: 0

## 2019-04-13 ASSESSMENT — PAIN DESCRIPTION - PROGRESSION
CLINICAL_PROGRESSION: NOT CHANGED

## 2019-04-13 ASSESSMENT — PAIN DESCRIPTION - PAIN TYPE: TYPE: ACUTE PAIN

## 2019-04-13 ASSESSMENT — PAIN DESCRIPTION - LOCATION: LOCATION: ABDOMEN

## 2019-04-13 NOTE — CARE COORDINATION
Patient tolerating advanced diet, still reports no BM. Discharge instructions reviewed with patient. Patient verbalized understanding. IV and telemetry discontinued. Patient awaiting wife for ride home.

## 2019-04-14 NOTE — DISCHARGE SUMMARY
with nausea and abdominal pain  It appears that he was here recently - discharged on 4/5  He did undergo cardiac cath on 4/5:  Conclusions:  1. Mild non-obstructive CAD  2. Overall preserved LV function   Recommendations:  1. Medical Therapy. Start BP control; Norvasc 10 mg daily.   2. Risk Factors Modification including smoking cessation. 3. Post Cath Protocol  The patient responded to medical therapy  He states that after a couple days at home his symptoms recurred     He has been having nausea  He has passed gas but no BM  Appetites been poor     Initial database has revealed:  CT abdomen:  Impression:   Too numerous to count low-density liver lesions are likely cysts.  These are  stable. Stable adrenal glands. Multifocal small-bowel distention with fluid levels suggesting ileus. Nonspecific enteritis is possible.  Mesenteric vessels are patent.   No bowel wall thickening.      Lipase 38      Pro-      WBC 8.2 k/uL RBC 4.59 m/uL Hemoglobin 12.7Low      BP has been in the 170s/90s     The patient responded to conservative treatment  Diet was progressed  Laxatives ordered  Discharge planning initiated        Discharge plan:     Advance diet  Laxatives  Pain control  Antiemetics  Protonix  Surgical consultation / follow-up as scheduled  Correct electrolyte abnormalities  Blood Pressure - Monitor and control  Resume Norvasc  Reflux precautions  CPAP  Smoking cessation / education   DVT prophylaxis   GI follow-up - Dr Chika Cuadra this afternoon if stable  The patient was instructed to follow up with their PCP, Mynor Kent, DO in one week        Significant Diagnostic Studies:   Labs / Micro:   Hematology:  Recent Labs     04/11/19 2050 04/13/19  0603   WBC 8.2 6.3   HGB 12.7* 11.6*   HCT 41.5 38.8*    221     Chemistry:  Recent Labs     04/11/19 2050 04/13/19  0603    139   K 3.4* 3.9    107   CO2 23 21   GLUCOSE 142* 113*   BUN 11 8   CREATININE 1.01 0.86   MG 2.2 2.3   CALCIUM 9.0 8.5*   PHOS  --  2.9     Recent Labs     04/11/19 2050 04/13/19  0603   PROT 7.4 6.4   LABALBU 4.3 3.5   AST 11 19   ALT 9 10   ALKPHOS 87 77   BILITOT 0.25* 0.26*   BILIDIR <0.08  --    AMYLASE  --  53   LIPASE 38 22       Radiology:    Xr Chest Standard (2 Vw)    Result Date: 4/4/2019  EXAMINATION: TWO VIEWS OF THE CHEST 4/4/2019 10:22 am COMPARISON: 24 August 2017 HISTORY: ORDERING SYSTEM PROVIDED HISTORY: chest pain TECHNOLOGIST PROVIDED HISTORY: chest pain FINDINGS: Overlying cardiac monitoring electrodes are present. No cardiomegaly, vascular congestion effusion or pneumothorax is noted. Strand-like opacities are present at the lighting bases consistent with mild atelectasis. Osseous and mediastinal structures are age-appropriate. Mild atelectasis. Otherwise unremarkable study. Ct Abdomen Pelvis W Iv Contrast Additional Contrast? None    Result Date: 4/11/2019  EXAMINATION: CT OF THE ABDOMEN AND PELVIS WITH CONTRAST 4/11/2019 9:57 pm TECHNIQUE: CT of the abdomen and pelvis was performed with the administration of intravenous contrast. Multiplanar reformatted images are provided for review. Dose modulation, iterative reconstruction, and/or weight based adjustment of the mA/kV was utilized to reduce the radiation dose to as low as reasonably achievable. COMPARISON: May 2015 HISTORY: ORDERING SYSTEM PROVIDED HISTORY: nausea and constipation for last 2 weeks despite 2 enemas. TECHNOLOGIST PROVIDED HISTORY: Ordering Physician Provided Reason for Exam: nausea x 2 weeks Acuity: Unknown Type of Exam: Unknown Additional signs and symptoms: constipation x 2 weeks FINDINGS: Lower Chest: Dependent atelectasis is noted. Bronchiectatic changes are noted. Vascular calcifications are noted. Small pericardial effusion is noted. Small hiatal hernia is noted. Organs: Too numerous to count low-density liver lesions are again noted. No gallstones are noted.   No splenic or pancreatic lesions are noted. Slight adrenal gland fullness is noted, left greater than right. This is not grossly changed. Hyperplasia or adenoma is favored. Low-density left renal lesion is noted. This is likely a small cyst. GI/Bowel: Stool is noted throughout the colon. Mild small-bowel distention in the left side of the abdomen is noted with multiple air-fluid levels. There is no pneumatosis. There is no bowel wall thickening. Appendix is normal. Pelvis: Multiple small bowel loops are mildly dilated with fluid. Urinary bladder detail is significantly limited due to artifact. There is suggested wall thickening. Sigmoid redundancy is noted. There is no bowel wall thickening in the pelvis. Vascular calcifications are noted. Peritoneum/Retroperitoneum: Vascular calcifications are noted. There is no aneurysm. Scattered small lymph nodes are noted. There is no pathologic enlargement Bones/Soft Tissues: Postop changes in the right hip are noted give rise to artifact. Degenerative changes in the left hip are noted. SI joint degenerative changes and lumbar spine degenerative changes are noted. Disc bulging at L5-S1 is noted. Canal detail is limited     Too numerous to count low-density liver lesions are likely cysts. These are stable. Stable adrenal glands. Multifocal small-bowel distention with fluid levels suggesting ileus. Nonspecific enteritis is possible. Mesenteric vessels are patent. No bowel wall thickening.          Consultations:    Consults:     Final Specialist Recommendations/Findings:   IP CONSULT TO GENERAL SURGERY  IP CONSULT TO HOSPITALIST  IP CONSULT TO GENERAL SURGERY        Discharged Condition:    Stable     Disposition: Home    Physician Follow Up:   Shalini Sapp DO  [address] [facility]  [phone]             Diet:   As tolerated     Activity:   As tolerated    Discharge Medications:      Medication List      START taking these medications    ondansetron 4 MG

## 2019-04-22 ENCOUNTER — APPOINTMENT (OUTPATIENT)
Dept: GENERAL RADIOLOGY | Age: 69
DRG: 389 | End: 2019-04-22
Payer: MEDICARE

## 2019-04-22 ENCOUNTER — HOSPITAL ENCOUNTER (INPATIENT)
Age: 69
LOS: 3 days | Discharge: HOME | DRG: 389 | End: 2019-04-25
Attending: EMERGENCY MEDICINE | Admitting: FAMILY MEDICINE
Payer: MEDICARE

## 2019-04-22 DIAGNOSIS — K56.7 ILEUS (HCC): ICD-10-CM

## 2019-04-22 DIAGNOSIS — E86.0 DEHYDRATION: Primary | ICD-10-CM

## 2019-04-22 LAB
ABSOLUTE EOS #: 0.2 K/UL (ref 0–0.4)
ABSOLUTE IMMATURE GRANULOCYTE: ABNORMAL K/UL (ref 0–0.3)
ABSOLUTE LYMPH #: 2.1 K/UL (ref 1–4.8)
ABSOLUTE MONO #: 0.6 K/UL (ref 0.2–0.8)
ALBUMIN SERPL-MCNC: 4.1 G/DL (ref 3.5–5.2)
ALBUMIN/GLOBULIN RATIO: ABNORMAL (ref 1–2.5)
ALP BLD-CCNC: 105 U/L (ref 40–129)
ALT SERPL-CCNC: 77 U/L (ref 5–41)
AMYLASE: 96 U/L (ref 28–100)
ANION GAP SERPL CALCULATED.3IONS-SCNC: 18 MMOL/L (ref 9–17)
AST SERPL-CCNC: 34 U/L
BASOPHILS # BLD: 0 % (ref 0–2)
BASOPHILS ABSOLUTE: 0 K/UL (ref 0–0.2)
BILIRUB SERPL-MCNC: 0.32 MG/DL (ref 0.3–1.2)
BILIRUBIN DIRECT: 0.1 MG/DL
BILIRUBIN, INDIRECT: 0.22 MG/DL (ref 0–1)
BUN BLDV-MCNC: 36 MG/DL (ref 8–23)
BUN/CREAT BLD: 24 (ref 9–20)
CALCIUM SERPL-MCNC: 8.9 MG/DL (ref 8.6–10.4)
CHLORIDE BLD-SCNC: 102 MMOL/L (ref 98–107)
CO2: 23 MMOL/L (ref 20–31)
CREAT SERPL-MCNC: 1.53 MG/DL (ref 0.7–1.2)
DIFFERENTIAL TYPE: ABNORMAL
EOSINOPHILS RELATIVE PERCENT: 2 % (ref 1–4)
GFR AFRICAN AMERICAN: 55 ML/MIN
GFR NON-AFRICAN AMERICAN: 45 ML/MIN
GFR SERPL CREATININE-BSD FRML MDRD: ABNORMAL ML/MIN/{1.73_M2}
GFR SERPL CREATININE-BSD FRML MDRD: ABNORMAL ML/MIN/{1.73_M2}
GLOBULIN: ABNORMAL G/DL (ref 1.5–3.8)
GLUCOSE BLD-MCNC: 112 MG/DL (ref 70–99)
HCT VFR BLD CALC: 44.5 % (ref 41–53)
HEMOGLOBIN: 14.5 G/DL (ref 13.5–17.5)
IMMATURE GRANULOCYTES: ABNORMAL %
LIPASE: 73 U/L (ref 13–60)
LYMPHOCYTES # BLD: 21 % (ref 24–44)
MCH RBC QN AUTO: 28.8 PG (ref 26–34)
MCHC RBC AUTO-ENTMCNC: 32.5 G/DL (ref 31–37)
MCV RBC AUTO: 88.5 FL (ref 80–100)
MONOCYTES # BLD: 6 % (ref 1–7)
NRBC AUTOMATED: ABNORMAL PER 100 WBC
PDW BLD-RTO: 16 % (ref 11.5–14.5)
PLATELET # BLD: 270 K/UL (ref 130–400)
PLATELET ESTIMATE: ABNORMAL
PMV BLD AUTO: ABNORMAL FL (ref 6–12)
POTASSIUM SERPL-SCNC: 3.9 MMOL/L (ref 3.7–5.3)
RBC # BLD: 5.03 M/UL (ref 4.5–5.9)
RBC # BLD: ABNORMAL 10*6/UL
SEG NEUTROPHILS: 71 % (ref 36–66)
SEGMENTED NEUTROPHILS ABSOLUTE COUNT: 7.1 K/UL (ref 1.8–7.7)
SODIUM BLD-SCNC: 143 MMOL/L (ref 135–144)
TOTAL PROTEIN: 7.9 G/DL (ref 6.4–8.3)
WBC # BLD: 10 K/UL (ref 3.5–11)
WBC # BLD: ABNORMAL 10*3/UL

## 2019-04-22 PROCEDURE — 6370000000 HC RX 637 (ALT 250 FOR IP): Performed by: FAMILY MEDICINE

## 2019-04-22 PROCEDURE — 6360000002 HC RX W HCPCS: Performed by: FAMILY MEDICINE

## 2019-04-22 PROCEDURE — 6360000002 HC RX W HCPCS: Performed by: SURGERY

## 2019-04-22 PROCEDURE — 85025 COMPLETE CBC W/AUTO DIFF WBC: CPT

## 2019-04-22 PROCEDURE — 83690 ASSAY OF LIPASE: CPT

## 2019-04-22 PROCEDURE — 2500000003 HC RX 250 WO HCPCS: Performed by: SURGERY

## 2019-04-22 PROCEDURE — 80076 HEPATIC FUNCTION PANEL: CPT

## 2019-04-22 PROCEDURE — 82150 ASSAY OF AMYLASE: CPT

## 2019-04-22 PROCEDURE — 96372 THER/PROPH/DIAG INJ SC/IM: CPT

## 2019-04-22 PROCEDURE — 96374 THER/PROPH/DIAG INJ IV PUSH: CPT

## 2019-04-22 PROCEDURE — 74022 RADEX COMPL AQT ABD SERIES: CPT

## 2019-04-22 PROCEDURE — 1200000000 HC SEMI PRIVATE

## 2019-04-22 PROCEDURE — 99285 EMERGENCY DEPT VISIT HI MDM: CPT

## 2019-04-22 PROCEDURE — 2580000003 HC RX 258: Performed by: EMERGENCY MEDICINE

## 2019-04-22 PROCEDURE — 80048 BASIC METABOLIC PNL TOTAL CA: CPT

## 2019-04-22 PROCEDURE — 6360000002 HC RX W HCPCS: Performed by: EMERGENCY MEDICINE

## 2019-04-22 PROCEDURE — 2580000003 HC RX 258: Performed by: FAMILY MEDICINE

## 2019-04-22 RX ORDER — MORPHINE SULFATE 4 MG/ML
4 INJECTION, SOLUTION INTRAMUSCULAR; INTRAVENOUS ONCE
Status: COMPLETED | OUTPATIENT
Start: 2019-04-22 | End: 2019-04-22

## 2019-04-22 RX ORDER — OMEPRAZOLE 40 MG/1
40 CAPSULE, DELAYED RELEASE ORAL
COMMUNITY

## 2019-04-22 RX ORDER — ONDANSETRON 2 MG/ML
4 INJECTION INTRAMUSCULAR; INTRAVENOUS EVERY 6 HOURS PRN
Status: DISCONTINUED | OUTPATIENT
Start: 2019-04-22 | End: 2019-04-22 | Stop reason: ALTCHOICE

## 2019-04-22 RX ORDER — PROMETHAZINE HYDROCHLORIDE 25 MG/ML
6.25 INJECTION, SOLUTION INTRAMUSCULAR; INTRAVENOUS ONCE
Status: COMPLETED | OUTPATIENT
Start: 2019-04-22 | End: 2019-04-22

## 2019-04-22 RX ORDER — PANTOPRAZOLE SODIUM 40 MG/1
40 TABLET, DELAYED RELEASE ORAL
Status: DISCONTINUED | OUTPATIENT
Start: 2019-04-23 | End: 2019-04-25 | Stop reason: HOSPADM

## 2019-04-22 RX ORDER — CIPROFLOXACIN 2 MG/ML
400 INJECTION, SOLUTION INTRAVENOUS EVERY 12 HOURS
Status: DISCONTINUED | OUTPATIENT
Start: 2019-04-22 | End: 2019-04-24

## 2019-04-22 RX ORDER — AMLODIPINE BESYLATE 10 MG/1
10 TABLET ORAL DAILY
Status: DISCONTINUED | OUTPATIENT
Start: 2019-04-22 | End: 2019-04-25 | Stop reason: HOSPADM

## 2019-04-22 RX ORDER — SODIUM CHLORIDE 9 MG/ML
INJECTION, SOLUTION INTRAVENOUS CONTINUOUS
Status: DISCONTINUED | OUTPATIENT
Start: 2019-04-22 | End: 2019-04-25

## 2019-04-22 RX ORDER — DOCUSATE SODIUM 100 MG/1
100 CAPSULE, LIQUID FILLED ORAL PRN
Status: DISCONTINUED | OUTPATIENT
Start: 2019-04-22 | End: 2019-04-25 | Stop reason: HOSPADM

## 2019-04-22 RX ORDER — SODIUM CHLORIDE 0.9 % (FLUSH) 0.9 %
10 SYRINGE (ML) INJECTION PRN
Status: DISCONTINUED | OUTPATIENT
Start: 2019-04-22 | End: 2019-04-25 | Stop reason: HOSPADM

## 2019-04-22 RX ORDER — SODIUM CHLORIDE 0.9 % (FLUSH) 0.9 %
10 SYRINGE (ML) INJECTION EVERY 12 HOURS SCHEDULED
Status: DISCONTINUED | OUTPATIENT
Start: 2019-04-22 | End: 2019-04-25 | Stop reason: HOSPADM

## 2019-04-22 RX ORDER — PROMETHAZINE HYDROCHLORIDE 25 MG/1
25 TABLET ORAL EVERY 6 HOURS
COMMUNITY

## 2019-04-22 RX ORDER — OXYCODONE HYDROCHLORIDE AND ACETAMINOPHEN 5; 325 MG/1; MG/1
2 TABLET ORAL EVERY 4 HOURS PRN
Status: DISCONTINUED | OUTPATIENT
Start: 2019-04-22 | End: 2019-04-25 | Stop reason: HOSPADM

## 2019-04-22 RX ORDER — ONDANSETRON 2 MG/ML
4 INJECTION INTRAMUSCULAR; INTRAVENOUS EVERY 6 HOURS PRN
Status: DISCONTINUED | OUTPATIENT
Start: 2019-04-22 | End: 2019-04-23

## 2019-04-22 RX ORDER — ACETAMINOPHEN 325 MG/1
650 TABLET ORAL EVERY 4 HOURS PRN
Status: DISCONTINUED | OUTPATIENT
Start: 2019-04-22 | End: 2019-04-25 | Stop reason: HOSPADM

## 2019-04-22 RX ORDER — ONDANSETRON 4 MG/1
4 TABLET, ORALLY DISINTEGRATING ORAL EVERY 6 HOURS PRN
Status: DISCONTINUED | OUTPATIENT
Start: 2019-04-22 | End: 2019-04-23

## 2019-04-22 RX ADMIN — MORPHINE SULFATE 4 MG: 4 INJECTION INTRAVENOUS at 11:05

## 2019-04-22 RX ADMIN — CIPROFLOXACIN 400 MG: 2 INJECTION, SOLUTION INTRAVENOUS at 20:33

## 2019-04-22 RX ADMIN — ONDANSETRON 4 MG: 2 INJECTION INTRAMUSCULAR; INTRAVENOUS at 17:03

## 2019-04-22 RX ADMIN — PROMETHAZINE HYDROCHLORIDE 6.25 MG: 25 INJECTION INTRAMUSCULAR; INTRAVENOUS at 11:05

## 2019-04-22 RX ADMIN — OXYCODONE AND ACETAMINOPHEN 2 TABLET: 5; 325 TABLET ORAL at 20:34

## 2019-04-22 RX ADMIN — SODIUM CHLORIDE 200 ML: 9 INJECTION, SOLUTION INTRAVENOUS at 11:04

## 2019-04-22 RX ADMIN — AMLODIPINE BESYLATE 10 MG: 10 TABLET ORAL at 20:37

## 2019-04-22 RX ADMIN — METRONIDAZOLE 500 MG: 500 INJECTION, SOLUTION INTRAVENOUS at 18:39

## 2019-04-22 RX ADMIN — SODIUM CHLORIDE: 9 INJECTION, SOLUTION INTRAVENOUS at 17:04

## 2019-04-22 ASSESSMENT — PAIN SCALES - GENERAL
PAINLEVEL_OUTOF10: 6
PAINLEVEL_OUTOF10: 8
PAINLEVEL_OUTOF10: 8
PAINLEVEL_OUTOF10: 0

## 2019-04-22 ASSESSMENT — ENCOUNTER SYMPTOMS
ABDOMINAL PAIN: 1
COUGH: 0
SHORTNESS OF BREATH: 0
EYE REDNESS: 0
BLOOD IN STOOL: 0
CONSTIPATION: 0
EYE DISCHARGE: 0
DIARRHEA: 1
COLOR CHANGE: 0
FACIAL SWELLING: 0
VOMITING: 0

## 2019-04-22 NOTE — CONSULTS
(N/A, 5/29/2018); Colonoscopy (5/29/2018); Colonoscopy (5/29/2018); Upper gastrointestinal endoscopy (N/A, 6/28/2018); Upper gastrointestinal endoscopy (N/A, 12/13/2018); Upper gastrointestinal endoscopy (12/13/2018); Total hip arthroplasty (Right); and Upper gastrointestinal endoscopy (N/A, 1/15/2019). Medications  Prior to Admission medications    Medication Sig Start Date End Date Taking? Authorizing Provider   promethazine (PHENERGAN) 25 MG tablet Take 25 mg by mouth every 6 hours   Yes Historical Provider, MD   Docusate Calcium (STOOL SOFTENER PO) Take 100 mg by mouth as needed (constipation)    Yes Historical Provider, MD   omeprazole (PRILOSEC) 40 MG delayed release capsule Take 40 mg by mouth every morning (before breakfast)   Yes Historical Provider, MD   amLODIPine (NORVASC) 10 MG tablet Take 1 tablet by mouth daily 4/5/19  Yes Eleanor Benavidez MD     Allergies  has No Known Allergies. Family History  family history includes High Blood Pressure in his father; Other in his mother. Social History   reports that he has been smoking cigarettes. He has a 30.00 pack-year smoking history. He has never used smokeless tobacco. He reports that he does not drink alcohol or use drugs. Review of Systems:  General Denies any fever or chills  HEENT Denies any diplopia, tinnitus or vertigo  Resp Denies any shortness of breath, cough or wheezing  Cardiac Denies any chest pain, palpitations, claudication or edema  GI Denies any melena, hematochezia, hematemesis or pyrosis   Denies any frequency, urgency, hesitancy or incontinence  Heme Denies bruising or bleeding easily  Endocrine Denies any history of diabetes or thyroid disease  Neuro Denies any focal motor or sensory deficits    OBJECTIVE:   VITALS:  height is 5' 8\" (1.727 m) and weight is 221 lb 8 oz (100.5 kg). His oral temperature is 97.2 °F (36.2 °C). His blood pressure is 174/67 (abnormal) and his pulse is 76.  His respiration is 18 and oxygen saturation is 100%. CONSTITUTIONAL: Alert and oriented times 3, no acute distress and cooperative to examination with proper mood and affect. SKIN: Skin color, texture, turgor normal. No rashes or lesions. LYMPH: no cervical nodes, no inguinal nodes  HEENT: Head is normocephalic, atraumatic. EOMI, PERRLA  NECK: Supple, symmetrical, trachea midline, no adenopathy, thyroid symmetric, not enlarged and no tenderness, skin normal  CHEST/LUNGS: chest symmetric with normal A/P diameter, normal respiratory rate and rhythm, lungs clear to auscultation without wheezes, rales or rhonchi. No accessory muscle use. CARDIOVASCULAR: Heart regular rate and rhythm   ABDOMEN: Normal shape. Normal bowel sounds. No bruits. Soft, nondistended, no masses or organomegaly. no evidence of hernia. A lump is palpable in the right groin, likely hematoma from recent catheterization. Percussion: Normal without hepatosplenomegally. Tenderness: absent  RECTAL: Deferred at this time  NEUROLOGIC: There are no focalizing motor or sensory deficits. CN II-XII are grossly intact.   EXTREMITIES: no cyanosis, no clubbing and no edema    LABS:     CBC with Differential:    Lab Results   Component Value Date    WBC 10.0 04/22/2019    RBC 5.03 04/22/2019    HGB 14.5 04/22/2019    HCT 44.5 04/22/2019     04/22/2019    MCV 88.5 04/22/2019    MCH 28.8 04/22/2019    MCHC 32.5 04/22/2019    RDW 16.0 04/22/2019    LYMPHOPCT 21 04/22/2019    MONOPCT 6 04/22/2019    MYELOPCT 1 08/25/2017    BASOPCT 0 04/22/2019    MONOSABS 0.60 04/22/2019    LYMPHSABS 2.10 04/22/2019    EOSABS 0.20 04/22/2019    BASOSABS 0.00 04/22/2019    DIFFTYPE NOT REPORTED 04/22/2019     CMP:    Lab Results   Component Value Date     04/22/2019    K 3.9 04/22/2019     04/22/2019    CO2 23 04/22/2019    BUN 36 04/22/2019    CREATININE 1.53 04/22/2019    GFRAA 55 04/22/2019    LABGLOM 45 04/22/2019    GLUCOSE 112 04/22/2019    PROT 7.9 04/22/2019    LABALBU 4.1 04/22/2019 CALCIUM 8.9 04/22/2019    BILITOT 0.32 04/22/2019    ALKPHOS 105 04/22/2019    AST 34 04/22/2019    ALT 77 04/22/2019     U/A:    Lab Results   Component Value Date    COLORU YELLOW 04/13/2019    PROTEINU TRACE 04/13/2019    PHUR 7.0 04/13/2019    WBCUA 5 TO 10 04/13/2019    RBCUA 10 TO 20 04/13/2019    MUCUS NOT REPORTED 04/13/2019    TRICHOMONAS NOT REPORTED 04/13/2019    YEAST NOT REPORTED 04/13/2019    BACTERIA NOT REPORTED 04/13/2019    SPECGRAV 1.024 04/13/2019    LEUKOCYTESUR NEGATIVE 04/13/2019    UROBILINOGEN Normal 04/13/2019    BILIRUBINUR NEGATIVE 04/13/2019    GLUCOSEU NEGATIVE 04/13/2019    AMORPHOUS NOT REPORTED 04/13/2019         RADIOLOGY:   I have personally reviewed the following films:  CT scan abd/pelvis:   Reviewed CT from admission to [facility] two weeks ago. Consistent with enteritis, possible mild diverticulitis  Abd flat plate:   Chest:  Cardiomediastinal silhouette and pulmonary vasculature are stable. No focal airspace consolidation, pneumothorax, or pleural effusion. No free  air beneath the diaphragm. No evidence of acute osseous abnormality. Abdomen: There are scattered air-fluid levels in the small bowel. No  abnormal bowel distention is seen. Stool burden is within normal limits, as  visualized. No free air. No evidence of acute osseous abnormality. Right  hip arthroplasty is noted. There are moderate degenerative changes in the  left hip. No abnormal calcifications are seen. Impression:      1. No acute intrathoracic process. 2.  Scattered air-fluid levels without abnormal bowel distention, suggesting  mild ileus or enteritis. No definite evidence of bowel obstruction at this  time. No free air. IMPRESSION:   1. Likely enteritis, ? Mild diverticulitis  2. ? Mild ileus  3. No evidence of SBO    Active Problems:    Dehydration  Resolved Problems:    * No resolved hospital problems. *      PLAN:   1. Will start Cipro/Flagyl  2.  Recommend GI evaluation  3. Agree with V fluids  4. Further recommendations to follow      Thank you for this interesting consult and for allowing us to participate in the care of your patient. Please feel free to contact me with any questions or concerns.

## 2019-04-22 NOTE — PROGRESS NOTES
Pt is a current smoker. Pt smokes 0.25 packs per day for the past 20+ years  Nicotine patch offered and pt declined. Smoking cessation given   Pt is ready to quit once he is discharged.

## 2019-04-22 NOTE — ED PROVIDER NOTES
Procedure Laterality Date    COLONOSCOPY      COLONOSCOPY  2018    COLONOSCOPY POLYPECTOMY SNARE/COLD BIOPSY performed by Al Smith MD at [facility]    COLONOSCOPY  2018    COLONOSCOPY WITH BIOPSY performed by Al Smith MD at [facility]    ENDOSCOPY, COLON, DIAGNOSTIC      KNEE SURGERY Right     meniscus    CA EGD BALLOON DILATION ESOPHAGUS <30 MM DIAM N/A 2017    EGD ESOPHAGOGASTRODUODENOSCOPY DILATATION performed by Al Smith MD at [facility]    CA  [address] <30 MM DIAM N/A 2017    EGD ESOPHAGOGASTRODUODENOSCOPY DILATATION performed by Al Smith MD at [facility]    CA ESOPHAGOGASTRODUODENOSCOPY TRANSORAL DIAGNOSTIC N/A 2017    EGD ESOPHAGOGASTRODUODENOSCOPY  AND PEDIATRIC SCOPE performed by Humera Junior MD at [address] [place] [address] ESOPHAGOGASTRODUODENOSCOPY TRANSORAL DIAGNOSTIC  3/17/2017    EGD ESOPHAGOGASTRODUODENOSCOPY performed [facility] at [facility]  May 2015    TOTAL HIP ARTHROPLASTY Right     UPPER GASTROINTESTINAL ENDOSCOPY  2017    egd with dilation    UPPER GASTROINTESTINAL ENDOSCOPY  2017    UPPER GASTROINTESTINAL ENDOSCOPY N/A 2017    EGD ESOPHAGOGASTRODUODENOSCOPY WITH DILATION SAVORY DILATORS 14-15-16MM performed by Al Smith MD at [address] [place] [address] N/A 2018    EGD DIAGNOSTIC ONLY performed by Al Smith MD at [address] [place] [address] N/A 2018    EGD DILATION SAVORY performed by Al Smith MD at [address] [place] [address] N/A 2018    EGD BIOPSY performed by Al Smith MD at [address] [place] [address]  2018    EGD DILATION SAVORY performed by Al Smith MD at [address] [place] [address] N/A 1/15/2019    EGD DILATION SAVORY performed by Patria Castañeda arthroplasty is noted. There are moderate degenerative changes in the left hip. No abnormal calcifications are seen. 1.  No acute intrathoracic process. 2.  Scattered air-fluid levels without abnormal bowel distention, suggesting mild ileus or enteritis. No definite evidence of bowel obstruction at this time. No free air. LABS:  Labs Reviewed   CBC WITH AUTO DIFFERENTIAL - Abnormal; Notable for the following components:       Result Value    RDW 16.0 (*)     Seg Neutrophils 71 (*)     Lymphocytes 21 (*)     All other components within normal limits   BASIC METABOLIC PANEL - Abnormal; Notable for the following components:    Glucose 112 (*)     BUN 36 (*)     CREATININE 1.53 (*)     Bun/Cre Ratio 24 (*)     Anion Gap 18 (*)     GFR Non- 45 (*)     GFR  55 (*)     All other components within normal limits   LIPASE - Abnormal; Notable for the following components:    Lipase 73 (*)     All other components within normal limits   HEPATIC FUNCTION PANEL - Abnormal; Notable for the following components:    ALT 77 (*)     All other components within normal limits   AMYLASE       All other labs were within normal range or not returned as of this dictation. EMERGENCY DEPARTMENT COURSE and DIFFERENTIAL DIAGNOSIS/MDM:   Vitals:    Vitals:    04/22/19 1000   BP: (!) 144/59   Pulse: 89   Resp: 20   Temp: 98.4 °F (36.9 °C)   TempSrc: Oral   SpO2: 96%   Weight: 230 lb (104.3 kg)   Height: 5' 8\" (1.727 m)       Orders Placed This Encounter   Medications    0.9 % sodium chloride infusion    promethazine (PHENERGAN) injection 6.25 mg    morphine sulfate (PF) injection 4 mg       Medical Decision Making: The patient has ileus and dehydration and is being admitted. He is not hypokalemic or hypocalcemic. Findings are discussed thoroughly with the patient. He had a CAT scan of his abdomen 11 days ago and this was not repeated.      CONSULTS:  IP CONSULT TO HOSPITALIST    PROCEDURES:  None    FINAL IMPRESSION      1. Dehydration    2.  Ileus [facility])          DISPOSITION/PLAN   DISPOSITION Admitted 04/22/2019 01:11:09 PM      PATIENT REFERRED TO:   Sushma Meyer DO  [facility]  [phone]            DISCHARGE MEDICATIONS:     New Prescriptions    No medications on file         (Please note that portions of this note were completed with a voice recognition program.  Efforts were made to edit the dictations but occasionally words are mis-transcribed.)    Drake Hernandez MD  Attending Emergency Physician           Drake Hernandez MD  04/22/19 [id number]

## 2019-04-22 NOTE — PROGRESS NOTES
Patient admitted to room 2008 from ER via stretcher with belongings and family at bedside. VSS  Patient oriented to room and call light. Assessment as charted. Admission database done. Call light in reach. Will continue to monitor.

## 2019-04-22 NOTE — PROGRESS NOTES
Writer contacted attending physician for orders. Orders as following: SX consult, zofran and tylenol ordered.

## 2019-04-22 NOTE — ED NOTES
Pt not in pain at this time. He is resting comfortably on cart.       Ama Ramires, JANE  04/22/19 1596

## 2019-04-22 NOTE — PLAN OF CARE
Problem: Pain:  Goal: Pain level will decrease  Description  Pain level will decrease  Outcome: Ongoing  Goal: Control of acute pain  Description  Control of acute pain  Outcome: Ongoing  Goal: Control of chronic pain  Description  Control of chronic pain  Outcome: Ongoing     Problem: Falls - Risk of:  Goal: Will remain free from falls  Description  Will remain free from falls  Outcome: Ongoing  Goal: Absence of physical injury  Description  Absence of physical injury  Outcome: Ongoing     Problem: SAFETY  Goal: LTG - patient will adhere to hip precautions during ADL's and transfers  Outcome: Ongoing  Goal: LTG - Patient will demonstrate safety requirements appropriate to situation/environment  Outcome: Ongoing  Goal: LTG - patient will utilize safety techniques  Outcome: Ongoing  Goal: STG - patient locks brakes on wheelchair  Outcome: Ongoing  Goal: STG - Patient uses call light consistently to request assistance with transfers  Outcome: Ongoing  Goal: STG - patient uses gait belt during all transfers  Outcome: Ongoing     Problem: Fluid Volume - Imbalance:  Goal: Absence of imbalanced fluid volume signs and symptoms  Description  Absence of imbalanced fluid volume signs and symptoms  Outcome: Ongoing

## 2019-04-22 NOTE — FLOWSHEET NOTE
Patient is awake and alert while sitting in a chair. Patient's daughter and grand daughter are present. Patient engages in conversation with writer and Beth Helms presents patient with a 's Pin. Patient reports family support and martín in [de-identified]. Patient accepts prayer offered by Beth Helms. Writer offers prayer for healing, peace, and rest. Patient seems to be coping and seems to have adequate family support. Patient expresses appreciation for the visit. 04/22/19 1027   Encounter Summary   Services provided to: Patient and family together   Referral/Consult From: [address] [place] [address]; Family members   Continue Visiting   (4/22/29)   Complexity of Encounter Low   Length of Encounter 30 minutes   Spiritual Assessment Completed Yes   Routine   Type Initial   Assessment Approachable;Coping   Intervention Active listening;Explored feelings, thoughts, concerns;Explored coping resources;Nurtured hope;Prayer;Sustaining presence/ Ministry of presence   Outcome Expressed gratitude

## 2019-04-22 NOTE — PROGRESS NOTES
Transitions of Care Pharmacy Service   Medication Review    The patient's list of current home medications has been reviewed. Source(s) of information: Patient/[facility]/[employer]/[facility]    Based on information provided by the above source(s), I have updated the patient's home med list as described below. Please review the ACTION REQUESTED BY PHYSICIAN section of this note for any discrepancies on current hospital orders. I changed or updated the following medications on the patient's home medication list:  Discontinued Zofran - patient said he was switched to promethazine  Polyethylene glycol - patient said he takes stool softener pills not powder     Added Promethazine 25 mg q6h - per [facility] 4/15/19  Stool softener - per patient he takes this PRN for constipation, but feels it does not work (patient was unable to tell me how often he takes this or how much)     Other Notes Amlodipine 10 mg daily per [facility] 4/5/19, picked up from [facility] 4/10/19 (patient says he hasn't taken this yet because of vomiting)  Patient said he used to take omeprazole before he started feeling sick about 2 weeks ago       Please feel free to call me with any questions about this encounter. Thank you. This note will be reviewed and co-signed by the Transitions of Care Pharmacist.    Hans Marie, PharmD student  Transitions of Care Pharmacy Service  Phone:  [phone]  Fax: [phone]      Electronically signed by Hans Marie on 4/22/2019 at 4:45 PM     Prior to Admission medications    Medication Sig Start Date End Date Taking?  Authorizing Provider   promethazine (PHENERGAN) 25 MG tablet Take 25 mg by mouth every 6 hours   Yes Historical Provider, MD   Docusate Calcium (STOOL SOFTENER PO) Take by mouth as needed   Yes Historical Provider, MD   amLODIPine (NORVASC) 10 MG tablet Take 1 tablet by mouth daily 4/5/19  Yes Kelley Puentes MD   omeprazole (PRILOSEC) 40 MG delayed release capsule TAKE 1 CAPSULE ONE TIME DAILY  45 TO 60  MINUTES BEFORE  FIRST  MEAL OF THE DAY 11/15/17  Yes Lydia Alex MD

## 2019-04-23 PROCEDURE — 2580000003 HC RX 258: Performed by: FAMILY MEDICINE

## 2019-04-23 PROCEDURE — 6360000002 HC RX W HCPCS: Performed by: FAMILY MEDICINE

## 2019-04-23 PROCEDURE — 2500000003 HC RX 250 WO HCPCS: Performed by: SURGERY

## 2019-04-23 PROCEDURE — 1200000000 HC SEMI PRIVATE

## 2019-04-23 PROCEDURE — 6360000002 HC RX W HCPCS: Performed by: SURGERY

## 2019-04-23 PROCEDURE — 6370000000 HC RX 637 (ALT 250 FOR IP): Performed by: NURSE PRACTITIONER

## 2019-04-23 PROCEDURE — 99222 1ST HOSP IP/OBS MODERATE 55: CPT | Performed by: INTERNAL MEDICINE

## 2019-04-23 PROCEDURE — 6370000000 HC RX 637 (ALT 250 FOR IP): Performed by: SURGERY

## 2019-04-23 PROCEDURE — 6370000000 HC RX 637 (ALT 250 FOR IP): Performed by: FAMILY MEDICINE

## 2019-04-23 RX ORDER — POLYETHYLENE GLYCOL 3350 17 G/17G
85 POWDER, FOR SOLUTION ORAL ONCE
Status: COMPLETED | OUTPATIENT
Start: 2019-04-23 | End: 2019-04-23

## 2019-04-23 RX ORDER — ONDANSETRON 2 MG/ML
4 INJECTION INTRAMUSCULAR; INTRAVENOUS EVERY 4 HOURS PRN
Status: DISCONTINUED | OUTPATIENT
Start: 2019-04-23 | End: 2019-04-25 | Stop reason: HOSPADM

## 2019-04-23 RX ORDER — ONDANSETRON 4 MG/1
4 TABLET, ORALLY DISINTEGRATING ORAL EVERY 4 HOURS PRN
Status: DISCONTINUED | OUTPATIENT
Start: 2019-04-23 | End: 2019-04-25 | Stop reason: HOSPADM

## 2019-04-23 RX ORDER — PROMETHAZINE HYDROCHLORIDE 25 MG/ML
6.25 INJECTION, SOLUTION INTRAMUSCULAR; INTRAVENOUS EVERY 6 HOURS PRN
Status: DISCONTINUED | OUTPATIENT
Start: 2019-04-23 | End: 2019-04-25 | Stop reason: HOSPADM

## 2019-04-23 RX ADMIN — ONDANSETRON 4 MG: 2 INJECTION INTRAMUSCULAR; INTRAVENOUS at 00:22

## 2019-04-23 RX ADMIN — SODIUM CHLORIDE: 9 INJECTION, SOLUTION INTRAVENOUS at 14:54

## 2019-04-23 RX ADMIN — POLYETHYLENE GLYCOL (3350) 85 G: 17 POWDER, FOR SOLUTION ORAL at 16:07

## 2019-04-23 RX ADMIN — PANTOPRAZOLE SODIUM 40 MG: 40 TABLET, DELAYED RELEASE ORAL at 05:16

## 2019-04-23 RX ADMIN — CIPROFLOXACIN 400 MG: 2 INJECTION, SOLUTION INTRAVENOUS at 20:53

## 2019-04-23 RX ADMIN — METRONIDAZOLE 500 MG: 500 INJECTION, SOLUTION INTRAVENOUS at 02:25

## 2019-04-23 RX ADMIN — SODIUM CHLORIDE: 9 INJECTION, SOLUTION INTRAVENOUS at 02:25

## 2019-04-23 RX ADMIN — Medication 10 ML: at 11:35

## 2019-04-23 RX ADMIN — METRONIDAZOLE 500 MG: 500 INJECTION, SOLUTION INTRAVENOUS at 18:39

## 2019-04-23 RX ADMIN — CIPROFLOXACIN 400 MG: 2 INJECTION, SOLUTION INTRAVENOUS at 05:16

## 2019-04-23 RX ADMIN — OXYCODONE AND ACETAMINOPHEN 2 TABLET: 5; 325 TABLET ORAL at 08:19

## 2019-04-23 RX ADMIN — AMLODIPINE BESYLATE 10 MG: 10 TABLET ORAL at 09:23

## 2019-04-23 RX ADMIN — ONDANSETRON 4 MG: 2 INJECTION INTRAMUSCULAR; INTRAVENOUS at 05:16

## 2019-04-23 RX ADMIN — PROMETHAZINE HYDROCHLORIDE 6.25 MG: 25 INJECTION INTRAMUSCULAR; INTRAVENOUS at 16:06

## 2019-04-23 RX ADMIN — ONDANSETRON 4 MG: 2 INJECTION INTRAMUSCULAR; INTRAVENOUS at 09:23

## 2019-04-23 RX ADMIN — METRONIDAZOLE 500 MG: 500 INJECTION, SOLUTION INTRAVENOUS at 10:10

## 2019-04-23 RX ADMIN — ENOXAPARIN SODIUM 40 MG: 40 INJECTION SUBCUTANEOUS at 12:46

## 2019-04-23 ASSESSMENT — PAIN DESCRIPTION - LOCATION
LOCATION: ABDOMEN
LOCATION: ABDOMEN

## 2019-04-23 ASSESSMENT — PAIN SCALES - GENERAL
PAINLEVEL_OUTOF10: 5
PAINLEVEL_OUTOF10: 5
PAINLEVEL_OUTOF10: 8
PAINLEVEL_OUTOF10: 8

## 2019-04-23 ASSESSMENT — PAIN DESCRIPTION - PAIN TYPE
TYPE: ACUTE PAIN
TYPE: ACUTE PAIN

## 2019-04-23 ASSESSMENT — PAIN DESCRIPTION - ORIENTATION: ORIENTATION: LOWER

## 2019-04-23 ASSESSMENT — PAIN DESCRIPTION - DESCRIPTORS: DESCRIPTORS: CONSTANT;CRAMPING

## 2019-04-23 NOTE — CONSULTS
INITIAL CONSULTATION     HISTORY OF PRESENT ILLNESS: Flaca Carlson is a 71 y.o. male admitted to the hospital on 2019 for abd pain, nausea and constipation. He is not a good historian. He reports diffuse abdominal pain. No clear triggers. Nausea. Fullness abdomen. He reports no bowel movements for one month. He was recently in the hospital with similar symptoms. Multiple prior admissions. He is requesting food. He has had several EGDs with dilation. History of colonoscopies with removal of polyps.      PAST MEDICAL HISTORY:     Past Medical History:   Diagnosis Date    Arthritis     BPH (benign prostatic hyperplasia)     [facility] es[facility] ([facility] [address] 2017    Esophageal hiatus hernia     GERD (gastroesophageal reflux disease)     History of dysphagia     several EGDs with dilation    History of esophageal stricture     History of radiation therapy     for prostate cancer    [facility]    states scheduled for surgery in the next month    Sleep apnea     no machine, says uses nasal pillows     Smoker         Past Surgical History:   Procedure Laterality Date    COLONOSCOPY      COLONOSCOPY  2018    COLONOSCOPY POLYPECTOMY SNARE/COLD BIOPSY performed by Ana Leiva MD at  [address] COLONOSCOPY  2018    COLONOSCOPY WITH BIOPSY performed by [facility] MD at [facility] 15, COLON, DIAGNOSTIC      KNEE SURGERY Right     meniscus    FL EGD BALLOON DILATION ESOPHAGUS <30 MM DIAM N/A 2017    EGD ESOPHAGOGASTRODUODENOSCOPY DILATATION performed by Ana Leiva MD at [facility] Endoscopy    [place]  [address] <30 MM DIAM N/A 2017    EGD ESOPHAGOGASTRODUODENOSCOPY DILATATION performed by [facility]iva MD at [facility] Endoscopy    FL ESOPHAGOGASTRODUODENOSCOPY TRANSORAL DIAGNOSTIC N/A 2017    EGD ESOPHAGOGASTRODUODENOSCOPY  AND PEDIATRIC SCOPE performed by Jenna Philip MD at [address] ESOPHAGOGASTRODUODENOSCOPY TRANSORAL DIAGNOSTIC  3/17/2017    EGD ESOPHAGOGASTRODUODENOSCOPY performed by Chaparrita Wang MD at [facility]  May 2015    TOTAL HIP ARTHROPLASTY Right     UPPER GASTROINTESTINAL ENDOSCOPY  01/30/2017    egd with dilation    UPPER GASTROINTESTINAL ENDOSCOPY  02/13/2017    UPPER GASTROINTESTINAL ENDOSCOPY N/A 6/2/2017    EGD ESOPHAGOGASTRODUODENOSCOPY WITH DILATION SAVORY DILATORS 14-15-16MM performed by Chaparrita Wang MD at [address] [place] [address] N/A 5/29/2018    EGD DIAGNOSTIC ONLY performed by Chaparrita Wang MD at [address] [place] [address] N/A 6/28/2018    EGD DILATION SAVORY performed by Chaparrita Wang MD at [address] [place] [address] N/A 12/13/2018    EGD BIOPSY performed by Chaparrita Wang MD at [address] [place] [address]  12/13/2018    EGD DILATION SAVORY performed by Chaparrita Wang MD at [address] [place] [address] N/A 1/15/2019    EGD DILATION SAVORY performed by Sharmila Duran MD at [facility] Endoscopy          CURRENT MEDICATIONS:       Current Facility-Administered Medications:     ondansetron (ZOFRAN-ODT) disintegrating tablet 4 mg, 4 mg, Oral, Q4H PRN **OR** ondansetron (ZOFRAN) injection 4 mg, 4 mg, Intravenous, Q4H PRN, Dorie Aparicio, DO, 4 mg at 04/23/19 7285    enoxaparin (LOVENOX) injection 40 mg, 40 mg, Subcutaneous, Daily, Dorie Aparicio, DO, 40 mg at 04/23/19 1246    promethazine (PHENERGAN) injection 6.25 mg, 6.25 mg, Intramuscular, Q6H PRN, Dorie Chioy, DO    polyethylene glycol (GLYCOLAX) packet 85 g, 85 g, Oral, Once, Dee Beasley APRN - CNP    0.9 % sodium chloride infusion, , Intravenous, Continuous, Giovana Burgos MD, Last Rate: 200 mL/hr at 04/22/19 1104, 200 mL at 04/22/19 1104    sodium chloride flush 0.9 % injection 10 mL, 10 mL, Intravenous, 2 times per day, Brenton Score HGB 14.5 04/22/2019    HCT 44.5 04/22/2019    MCV 88.5 04/22/2019     04/22/2019     04/22/2019    K 3.9 04/22/2019     04/22/2019    CO2 23 04/22/2019    BUN 36 (H) 04/22/2019    CREATININE 1.53 (H) 04/22/2019    LABALBU 4.1 04/22/2019    BILITOT 0.32 04/22/2019    ALKPHOS 105 04/22/2019    AST 34 04/22/2019    ALT 77 (H) 04/22/2019      Lab Results   Component Value Date    RBC 5.03 04/22/2019    HGB 14.5 04/22/2019    MCV 88.5 04/22/2019    MCH 28.8 04/22/2019    MCHC 32.5 04/22/2019    RDW 16.0 (H) 04/22/2019    MPV NOT REPORTED 04/22/2019    BASOPCT 0 04/22/2019    LYMPHSABS 2.10 04/22/2019    MONOSABS 0.60 04/22/2019    NEUTROABS 7.10 04/22/2019    EOSABS 0.20 04/22/2019    BASOSABS 0.00 04/22/2019          DIAGNOSTIC TESTING:   Xr Chest Standard (2 Vw)    Result Date: 4/4/2019  EXAMINATION: TWO VIEWS OF THE CHEST 4/4/2019 10:22 am COMPARISON: 24 August 2017 HISTORY: ORDERING SYSTEM PROVIDED HISTORY: chest pain TECHNOLOGIST PROVIDED HISTORY: chest pain FINDINGS: Overlying cardiac monitoring electrodes are present. No cardiomegaly, vascular congestion effusion or pneumothorax is noted. Strand-like opacities are present at the lighting bases consistent with mild atelectasis. Osseous and mediastinal structures are age-appropriate. Mild atelectasis. Otherwise unremarkable study. Xr Acute Abd Series Chest 1 Vw    Result Date: 4/22/2019  EXAMINATION: TWO XRAY VIEWS OF THE ABDOMEN AND SINGLE  XRAY VIEW OF THE CHEST 4/22/2019 10:37 am COMPARISON: CT abdomen and pelvis dated 4/11/2019, chest x-ray dated 4/4/2019. HISTORY: ORDERING SYSTEM PROVIDED HISTORY: pain TECHNOLOGIST PROVIDED HISTORY: pain Ordering Physician Provided Reason for Exam: constipation Acuity: Acute FINDINGS: Chest:  Cardiomediastinal silhouette and pulmonary vasculature are stable. No focal airspace consolidation, pneumothorax, or pleural effusion. No free air beneath the diaphragm.   No evidence of acute osseous abnormality. Abdomen: There are scattered air-fluid levels in the small bowel. No abnormal bowel distention is seen. Stool burden is within normal limits, as visualized. No free air. No evidence of acute osseous abnormality. Right hip arthroplasty is noted. There are moderate degenerative changes in the left hip. No abnormal calcifications are seen. 1.  No acute intrathoracic process. 2.  Scattered air-fluid levels without abnormal bowel distention, suggesting mild ileus or enteritis. No definite evidence of bowel obstruction at this time. No free air. Ct Abdomen Pelvis W Iv Contrast Additional Contrast? None    Result Date: 4/11/2019  EXAMINATION: CT OF THE ABDOMEN AND PELVIS WITH CONTRAST 4/11/2019 9:57 pm TECHNIQUE: CT of the abdomen and pelvis was performed with the administration of intravenous contrast. Multiplanar reformatted images are provided for review. Dose modulation, iterative reconstruction, and/or weight based adjustment of the mA/kV was utilized to reduce the radiation dose to as low as reasonably achievable. COMPARISON: May 2015 HISTORY: ORDERING SYSTEM PROVIDED HISTORY: nausea and constipation for last 2 weeks despite 2 enemas. TECHNOLOGIST PROVIDED HISTORY: Ordering Physician Provided Reason for Exam: nausea x 2 weeks Acuity: Unknown Type of Exam: Unknown Additional signs and symptoms: constipation x 2 weeks FINDINGS: Lower Chest: Dependent atelectasis is noted. Bronchiectatic changes are noted. Vascular calcifications are noted. Small pericardial effusion is noted. Small hiatal hernia is noted. Organs: Too numerous to count low-density liver lesions are again noted. No gallstones are noted. No splenic or pancreatic lesions are noted. Slight adrenal gland fullness is noted, left greater than right. This is not grossly changed. Hyperplasia or adenoma is favored. Low-density left renal lesion is noted.   This is likely a small cyst. GI/Bowel: Stool is noted throughout the colon.  Mild small-bowel distention in the left side of the abdomen is noted with multiple air-fluid levels. There is no pneumatosis. There is no bowel wall thickening. Appendix is normal. Pelvis: Multiple small bowel loops are mildly dilated with fluid. Urinary bladder detail is significantly limited due to artifact. There is suggested wall thickening. Sigmoid redundancy is noted. There is no bowel wall thickening in the pelvis. Vascular calcifications are noted. Peritoneum/Retroperitoneum: Vascular calcifications are noted. There is no aneurysm. Scattered small lymph nodes are noted. There is no pathologic enlargement Bones/Soft Tissues: Postop changes in the right hip are noted give rise to artifact. Degenerative changes in the left hip are noted. SI joint degenerative changes and lumbar spine degenerative changes are noted. Disc bulging at L5-S1 is noted. Canal detail is limited     Too numerous to count low-density liver lesions are likely cysts. These are stable. Stable adrenal glands. Multifocal small-bowel distention with fluid levels suggesting ileus. Nonspecific enteritis is possible. Mesenteric vessels are patent. No bowel wall thickening. IMPRESSION: Mr. Mary Mullins is a 71 y.o. male with abdominal pain. Nausea. Chronic constipation. We discussed extensively with the patient and his family. Water enema. 5 doses of MiraLAX today. Advance diet as tolerated. No evidence of colitis. No need for antibiotics at this point. Thank you for allowing me to participate in the care of Mr. Mary Mullins. For any further questions please do not hesitate to contact me.        Lupe Ross MD

## 2019-04-23 NOTE — H&P
[address]    HISTORY AND PHYSICAL EXAMINATION            Date:   4/23/2019  Patient name:  Yane North  Date of admission:  4/22/2019  9:59 AM  MRN:   [id number]  Account:  [de-identified]  YOB: 1950  PCP:    Dorie Aparicio DO  Room:   2008/2008-02  Code Status:    Full Code    Chief Complaint:     Chief Complaint   Patient presents with    Abdominal Pain     over 1 month       History Obtained From:     patient    History of Present Illness: The patient is a 71 y.o. Non-/non  male who presents with Abdominal Pain (over 1 month)   and he is admitted to the hospital for the management of  Ileus, started about 1 m0nth ago, has been hospitilized twice at [facility], had colonoscopy last year. Extreme nausea and no BMs other than when had enema. No previous history and no family history of colon cancer.         Past Medical History:     Past Medical History:   Diagnosis Date    Arthritis     BPH (benign prostatic hyperplasia)     Candida esophagitis ([facility] [address] 03/2017    Esophageal hiatus hernia     GERD (gastroesophageal reflux disease)     History of dysphagia     several EGDs with dilation    History of esophageal stricture     History of radiation therapy     for prostate cancer    Prostate cancer ([facility] [address] 04/2017    states scheduled for surgery in the next month    Sleep apnea     no machine, says uses nasal pillows     Smoker         Past Surgical History:     Past Surgical History:   Procedure Laterality Date    COLONOSCOPY      COLONOSCOPY  5/29/2018    COLONOSCOPY POLYPECTOMY SNARE/COLD BIOPSY performed by Chaparrita Wang MD at  [address] COLONOSCOPY  5/29/2018    COLONOSCOPY WITH BIOPSY performed by Chaparrita Wang MD at [facility] Endoscopy    ENDOSCOPY, COLON, DIAGNOSTIC      KNEE SURGERY Right     meniscus    ME EGD BALLOON DILATION ESOPHAGUS <30 MM DIAM N/A 4/28/2017    EGD ESOPHAGOGASTRODUODENOSCOPY DILATATION performed by Anabella Zambrano MD at [facility] Endoscopy    IN  [address] <30 MM DIAM N/A 2017    EGD ESOPHAGOGASTRODUODENOSCOPY DILATATION performed by Anabella Zambrano MD at [facility] Endoscopy    IN ESOPHAGOGASTRODUODENOSCOPY TRANSORAL DIAGNOSTIC N/A 2017    EGD ESOPHAGOGASTRODUODENOSCOPY  AND PEDIATRIC SCOPE performed by Abraham Shelton MD at [address] ESOPHAGOGASTRODUODENOSCOPY TRANSORAL DIAGNOSTIC  3/17/2017    EGD ESOPHAGOGASTRODUODENOSCOPY performed by [facility]ano MD at [facility]  May 2015    TOTAL HIP ARTHROPLASTY Right     UPPER GASTROINTESTINAL ENDOSCOPY  2017    egd with dilation    UPPER GASTROINTESTINAL ENDOSCOPY  2017    UPPER GASTROINTESTINAL ENDOSCOPY N/A 2017    EGD ESOPHAGOGASTRODUODENOSCOPY WITH DILATION SAVORY DILATORS 14-15-16MM performed by Anabella Zambrano MD at [address] [place] [address] N/A 2018    EGD DIAGNOSTIC ONLY performed by Anabella Zambrano MD at [address] [place] [address] N/A 2018    EGD DILATION SAVORY performed by Anabella Zambrano MD at [address] [place] [address] N/A 2018    EGD BIOPSY performed by Anabella Zambrano MD at [address] [place] [address]  2018    EGD DILATION SAVORY performed by Anabella Zambrano MD at [address] [place] [address] N/A 1/15/2019    EGD DILATION SAVORY performed by Abraham Shelton MD at [facility] Endoscopy        Medications Prior to Admission:     Prior to Admission medications    Medication Sig Start Date End Date Taking?  Authorizing Provider   promethazine (PHENERGAN) 25 MG tablet Take 25 mg by mouth every 6 hours   Yes Historical Provider, MD   Docusate Calcium (STOOL SOFTENER PO) Take 100 mg by mouth as needed (constipation)    Yes Historical Provider, MD   omeprazole (PRILOSEC) 40 MG delayed release capsule Take 40 mg by mouth every morning (before breakfast)   Yes Historical Provider, MD   amLODIPine (NORVASC) 10 MG tablet Take 1 tablet by mouth daily 19  Yes Lisseth Grey MD        Allergies:     Patient has no known allergies. Social History:     Tobacco:    reports that he has been smoking cigarettes. He has a 30.00 pack-year smoking history. He has never used smokeless tobacco.  Alcohol:      reports that he does not drink alcohol. Drug Use:  reports that he does not use drugs. Family History:     Family History   Problem Relation Age of Onset    Other Mother         breathing problems    High Blood Pressure Father        Review of Systems:     Positive and Negative as described in HPI. CONSTITUTIONAL:  negative for fevers, chills, sweats, fatigue, weight loss  HEENT:  negative for vision, hearing changes, runny nose, throat pain  RESPIRATORY:  negative for shortness of breath, cough, congestion, wheezing. CARDIOVASCULAR:  negative for chest pain, palpitations.   GASTROINTESTINAL:  negative for nausea, vomiting, diarrhea, constipation, change in bowel habits, abdominal pain   GENITOURINARY:  negative for difficulty of urination, burning with urination, frequency   INTEGUMENT:  negative for rash, skin lesions, easy bruising   HEMATOLOGIC/LYMPHATIC:  negative for swelling/edema   ALLERGIC/IMMUNOLOGIC:  negative for urticaria , itching  ENDOCRINE:  negative increase in drinking, increase in urination, hot or cold intolerance  MUSCULOSKELETAL:  negative joint pains, muscle aches, swelling of joints  NEUROLOGICAL:  negative for headaches, dizziness, lightheadedness, numbness, pain, tingling extremities  BEHAVIOR/PSYCH:  negative for depression, anxiety    Physical Exam:   BP (!) 146/61   Pulse 77   Temp 97.6 °F (36.4 °C) (Oral)   Resp 18   Ht 5' 8\" (1.727 m)   Wt 221 lb 8 oz (100.5 kg)   SpO2 100%   BMI 33.68 kg/m²   Temp (24hrs), Av.9 °F (36.6 °C), Min:97.2 °F (36.2 °C), Max:98.4 °F (36.9 °C)    No results for input(s): POCGLU in the last 72 hours. No intake or output data in the 24 hours ending 04/23/19 0951    General Appearance:  alert, well appearing, and in no acute distress  Mental status: oriented to person, place, and time with normal affect  Head:  normocephalic, atraumatic. Eye: no icterus, redness, pupils equal and reactive, extraocular eye movements intact, conjunctiva clear  Ear: normal external ear, no discharge, hearing intact  Nose:  no drainage noted  Mouth: mucous membranes moist  Neck: supple, no carotid bruits, thyroid not palpable  Lungs: Bilateral equal air entry, clear to ausculation, no wheezing, rales or rhonchi, normal effort  Cardiovascular: normal rate, regular rhythm, no murmur, gallop, rub.   Abdomen: Soft, nontender, nondistended, normal bowel sounds, no hepatomegaly or splenomegaly  Neurologic: There are no new focal motor or sensory deficits, normal muscle tone and bulk, no abnormal sensation, normal speech, cranial nerves II through XII grossly intact  Skin: No gross lesions, rashes, bruising or bleeding on exposed skin area  Extremities:  peripheral pulses palpable, no pedal edema or calf pain with palpation  Psych: normal affect     Investigations:      Laboratory Testing:  Recent Results (from the past 24 hour(s))   CBC Auto Differential    Collection Time: 04/22/19 10:50 AM   Result Value Ref Range    WBC 10.0 3.5 - 11.0 k/uL    RBC 5.03 4.5 - 5.9 m/uL    Hemoglobin 14.5 13.5 - 17.5 g/dL    Hematocrit 44.5 41 - 53 %    MCV 88.5 80 - 100 fL    MCH 28.8 26 - 34 pg    MCHC 32.5 31 - 37 g/dL    RDW 16.0 (H) 11.5 - 14.5 %    Platelets 857 412 - 775 k/uL    MPV NOT REPORTED 6.0 - 12.0 fL    NRBC Automated NOT REPORTED per 100 WBC    Differential Type NOT REPORTED     Immature Granulocytes NOT REPORTED 0 %    Absolute Immature Granulocyte NOT REPORTED 0.00 - 0.30 k/uL    WBC Morphology NOT REPORTED     RBC Morphology NOT REPORTED     Platelet Estimate NOT prophylaxis    Consultations:   IP CONSULT TO HOSPITALIST  IP CONSULT TO GENERAL SURGERY  IP CONSULT TO GI       Patient is admitted as inpatient status because of co-morbidities listed above, severity of signs and symptoms as outlined, requirement for current medical therapies and most importantly because of direct risk to patient if care not provided in a hospital setting.     Berny Zepeda DO  4/23/2019  9:51 AM    Copy sent to Dr. Berny Zepeda DO

## 2019-04-23 NOTE — PROGRESS NOTES
General Surgery Progress Note            PATIENT NAME: Mirela Blackman     TODAY'S DATE: 4/23/2019, 3:39 PM    SUBJECTIVE/OBJECTIVE:      VITALS:  BP (!) 120/57   Pulse 60   Temp 97.2 °F (36.2 °C) (Oral)   Resp 19   Ht 5' 8\" (1.727 m)   Wt 221 lb 8 oz (100.5 kg)   SpO2 100%   BMI 33.68 kg/m²      Patient seen and examined  Awake. Alert and oriented ×3. Nontoxic-appearing  Continues to complain about not moving his bowels  Denies nausea or vomiting  Heart regular rate and rhythm  Lungs clear to auscultation bilaterally  Abdomen soft. Nondistended. Nontender. Hypoactive bowel sounds  No peripheral edema    INTAKE/OUTPUT:      Intake/Output Summary (Last 24 hours) at 4/23/2019 1539  Last data filed at 4/23/2019 1100  Gross per 24 hour   Intake 480 ml   Output --   Net 480 ml       CBC with Differential:    Lab Results   Component Value Date    WBC 10.0 04/22/2019    RBC 5.03 04/22/2019    HGB 14.5 04/22/2019    HCT 44.5 04/22/2019     04/22/2019    MCV 88.5 04/22/2019    MCH 28.8 04/22/2019    MCHC 32.5 04/22/2019    RDW 16.0 04/22/2019    LYMPHOPCT 21 04/22/2019    MONOPCT 6 04/22/2019    MYELOPCT 1 08/25/2017    BASOPCT 0 04/22/2019    MONOSABS 0.60 04/22/2019    LYMPHSABS 2.10 04/22/2019    EOSABS 0.20 04/22/2019    BASOSABS 0.00 04/22/2019    DIFFTYPE NOT REPORTED 04/22/2019     CMP:    Lab Results   Component Value Date     04/22/2019    K 3.9 04/22/2019     04/22/2019    CO2 23 04/22/2019    BUN 36 04/22/2019    CREATININE 1.53 04/22/2019    GFRAA 55 04/22/2019    LABGLOM 45 04/22/2019    GLUCOSE 112 04/22/2019    PROT 7.9 04/22/2019    LABALBU 4.1 04/22/2019    CALCIUM 8.9 04/22/2019    BILITOT 0.32 04/22/2019    ALKPHOS 105 04/22/2019    AST 34 04/22/2019    ALT 77 04/22/2019           ASSESSMENT/PLAN:    Active Problems:    Dehydration  Resolved Problems:    * No resolved hospital problems. *    Chronic constipation  Ileus  Possible enteritis  GI note appreciated  ?   EGD  Would continue antibiotics for now

## 2019-04-24 LAB
ABSOLUTE EOS #: 0.3 K/UL (ref 0–0.4)
ABSOLUTE IMMATURE GRANULOCYTE: ABNORMAL K/UL (ref 0–0.3)
ABSOLUTE LYMPH #: 1.8 K/UL (ref 1–4.8)
ABSOLUTE MONO #: 0.6 K/UL (ref 0.2–0.8)
ANION GAP SERPL CALCULATED.3IONS-SCNC: 10 MMOL/L (ref 9–17)
BASOPHILS # BLD: 0 % (ref 0–2)
BASOPHILS ABSOLUTE: 0 K/UL (ref 0–0.2)
BUN BLDV-MCNC: 17 MG/DL (ref 8–23)
BUN/CREAT BLD: 15 (ref 9–20)
CALCIUM SERPL-MCNC: 8 MG/DL (ref 8.6–10.4)
CHLORIDE BLD-SCNC: 108 MMOL/L (ref 98–107)
CO2: 25 MMOL/L (ref 20–31)
CREAT SERPL-MCNC: 1.13 MG/DL (ref 0.7–1.2)
DIFFERENTIAL TYPE: ABNORMAL
EOSINOPHILS RELATIVE PERCENT: 4 % (ref 1–4)
GFR AFRICAN AMERICAN: >60 ML/MIN
GFR NON-AFRICAN AMERICAN: >60 ML/MIN
GFR SERPL CREATININE-BSD FRML MDRD: ABNORMAL ML/MIN/{1.73_M2}
GFR SERPL CREATININE-BSD FRML MDRD: ABNORMAL ML/MIN/{1.73_M2}
GLUCOSE BLD-MCNC: 88 MG/DL (ref 70–99)
HCT VFR BLD CALC: 35.9 % (ref 41–53)
HEMOGLOBIN: 11.5 G/DL (ref 13.5–17.5)
IMMATURE GRANULOCYTES: ABNORMAL %
LYMPHOCYTES # BLD: 27 % (ref 24–44)
MCH RBC QN AUTO: 28.4 PG (ref 26–34)
MCHC RBC AUTO-ENTMCNC: 32.1 G/DL (ref 31–37)
MCV RBC AUTO: 88.4 FL (ref 80–100)
MONOCYTES # BLD: 10 % (ref 1–7)
NRBC AUTOMATED: ABNORMAL PER 100 WBC
PDW BLD-RTO: 16.2 % (ref 11.5–14.5)
PLATELET # BLD: 168 K/UL (ref 130–400)
PLATELET ESTIMATE: ABNORMAL
PMV BLD AUTO: ABNORMAL FL (ref 6–12)
POTASSIUM SERPL-SCNC: 3.8 MMOL/L (ref 3.7–5.3)
RBC # BLD: 4.06 M/UL (ref 4.5–5.9)
RBC # BLD: ABNORMAL 10*6/UL
SEG NEUTROPHILS: 59 % (ref 36–66)
SEGMENTED NEUTROPHILS ABSOLUTE COUNT: 4.1 K/UL (ref 1.8–7.7)
SODIUM BLD-SCNC: 143 MMOL/L (ref 135–144)
WBC # BLD: 6.8 K/UL (ref 3.5–11)
WBC # BLD: ABNORMAL 10*3/UL

## 2019-04-24 PROCEDURE — 80048 BASIC METABOLIC PNL TOTAL CA: CPT

## 2019-04-24 PROCEDURE — 6360000002 HC RX W HCPCS: Performed by: SURGERY

## 2019-04-24 PROCEDURE — 2500000003 HC RX 250 WO HCPCS: Performed by: SURGERY

## 2019-04-24 PROCEDURE — APPNB30 APP NON BILLABLE TIME 0-30 MINS: Performed by: NURSE PRACTITIONER

## 2019-04-24 PROCEDURE — 1200000000 HC SEMI PRIVATE

## 2019-04-24 PROCEDURE — 36415 COLL VENOUS BLD VENIPUNCTURE: CPT

## 2019-04-24 PROCEDURE — 6370000000 HC RX 637 (ALT 250 FOR IP): Performed by: SURGERY

## 2019-04-24 PROCEDURE — 85025 COMPLETE CBC W/AUTO DIFF WBC: CPT

## 2019-04-24 PROCEDURE — 6370000000 HC RX 637 (ALT 250 FOR IP): Performed by: FAMILY MEDICINE

## 2019-04-24 PROCEDURE — 2580000003 HC RX 258: Performed by: FAMILY MEDICINE

## 2019-04-24 PROCEDURE — 6360000002 HC RX W HCPCS: Performed by: FAMILY MEDICINE

## 2019-04-24 PROCEDURE — 99232 SBSQ HOSP IP/OBS MODERATE 35: CPT | Performed by: INTERNAL MEDICINE

## 2019-04-24 RX ADMIN — METRONIDAZOLE 500 MG: 500 INJECTION, SOLUTION INTRAVENOUS at 03:11

## 2019-04-24 RX ADMIN — SODIUM CHLORIDE: 9 INJECTION, SOLUTION INTRAVENOUS at 03:12

## 2019-04-24 RX ADMIN — AMLODIPINE BESYLATE 10 MG: 10 TABLET ORAL at 09:15

## 2019-04-24 RX ADMIN — CIPROFLOXACIN 400 MG: 2 INJECTION, SOLUTION INTRAVENOUS at 06:34

## 2019-04-24 RX ADMIN — METRONIDAZOLE 500 MG: 500 INJECTION, SOLUTION INTRAVENOUS at 09:15

## 2019-04-24 RX ADMIN — METRONIDAZOLE 500 MG: 500 INJECTION, SOLUTION INTRAVENOUS at 16:40

## 2019-04-24 RX ADMIN — ENOXAPARIN SODIUM 40 MG: 40 INJECTION SUBCUTANEOUS at 09:14

## 2019-04-24 RX ADMIN — ONDANSETRON 4 MG: 2 INJECTION INTRAMUSCULAR; INTRAVENOUS at 09:15

## 2019-04-24 RX ADMIN — CIPROFLOXACIN 400 MG: 2 INJECTION, SOLUTION INTRAVENOUS at 18:15

## 2019-04-24 RX ADMIN — PANTOPRAZOLE SODIUM 40 MG: 40 TABLET, DELAYED RELEASE ORAL at 06:34

## 2019-04-24 RX ADMIN — OXYCODONE AND ACETAMINOPHEN 2 TABLET: 5; 325 TABLET ORAL at 09:15

## 2019-04-24 ASSESSMENT — PAIN DESCRIPTION - ONSET: ONSET: ON-GOING

## 2019-04-24 ASSESSMENT — PAIN DESCRIPTION - FREQUENCY: FREQUENCY: CONTINUOUS

## 2019-04-24 ASSESSMENT — PAIN SCALES - GENERAL
PAINLEVEL_OUTOF10: 8
PAINLEVEL_OUTOF10: 0

## 2019-04-24 ASSESSMENT — PAIN DESCRIPTION - PAIN TYPE: TYPE: ACUTE PAIN

## 2019-04-24 ASSESSMENT — PAIN DESCRIPTION - DESCRIPTORS: DESCRIPTORS: ACHING

## 2019-04-24 ASSESSMENT — PAIN DESCRIPTION - LOCATION: LOCATION: ABDOMEN

## 2019-04-24 ASSESSMENT — PAIN - FUNCTIONAL ASSESSMENT: PAIN_FUNCTIONAL_ASSESSMENT: ACTIVITIES ARE NOT PREVENTED

## 2019-04-24 ASSESSMENT — PAIN DESCRIPTION - ORIENTATION: ORIENTATION: LOWER

## 2019-04-24 ASSESSMENT — PAIN DESCRIPTION - PROGRESSION: CLINICAL_PROGRESSION: NOT CHANGED

## 2019-04-24 NOTE — PLAN OF CARE
Problem: Pain:  Goal: Pain level will decrease  Description  Pain level will decrease  Outcome: Ongoing  Goal: Control of acute pain  Description  Control of acute pain  Outcome: Ongoing  Goal: Control of chronic pain  Description  Control of chronic pain  Outcome: Ongoing     Problem: Falls - Risk of:  Goal: Will remain free from falls  Description  Will remain free from falls  Outcome: Ongoing  Goal: Absence of physical injury  Description  Absence of physical injury  Outcome: Ongoing     Problem: SAFETY  Goal: LTG - patient will adhere to hip precautions during ADL's and transfers  Outcome: Ongoing  Goal: LTG - Patient will demonstrate safety requirements appropriate to situation/environment  Outcome: Ongoing  Goal: LTG - patient will utilize safety techniques  Outcome: Ongoing  Goal: STG - patient locks brakes on wheelchair  Outcome: Ongoing  Goal: STG - Patient uses call light consistently to request assistance with transfers  Outcome: Ongoing  Goal: STG - patient uses gait belt during all transfers  Outcome: Ongoing     Problem: Fluid Volume - Imbalance:  Goal: Absence of imbalanced fluid volume signs and symptoms  Description  Absence of imbalanced fluid volume signs and symptoms  Outcome: Ongoing     Problem: Nausea/Vomiting:  Goal: Absence of nausea/vomiting  Description  Absence of nausea/vomiting  Outcome: Ongoing  Goal: Able to drink  Description  Able to drink  Outcome: Ongoing  Goal: Able to eat  Description  Able to eat  Outcome: Ongoing  Goal: Ability to achieve adequate nutritional intake will improve  Description  Ability to achieve adequate nutritional intake will improve  Outcome: Ongoing

## 2019-04-24 NOTE — PLAN OF CARE
Problem: Pain:  Goal: Pain level will decrease  Description  Pain level will decrease  4/24/2019 1026 by La Sykes RN  Outcome: Ongoing  4/24/2019 0506 by Alfred Morris RN  Outcome: Ongoing  Goal: Control of acute pain  Description  Control of acute pain  4/24/2019 1026 by La Sykes RN  Outcome: Ongoing  4/24/2019 0506 by Alfred Morris RN  Outcome: Ongoing  Goal: Control of chronic pain  Description  Control of chronic pain  4/24/2019 1026 by La Sykes RN  Outcome: Ongoing  4/24/2019 0506 by Alfred Morris RN  Outcome: Ongoing     Problem: Falls - Risk of:  Goal: Will remain free from falls  Description  Will remain free from falls  4/24/2019 1026 by La Sykes RN  Outcome: Ongoing  4/24/2019 0506 by Alfred Morris RN  Outcome: Ongoing  Goal: Absence of physical injury  Description  Absence of physical injury  4/24/2019 1026 by La Sykes RN  Outcome: Ongoing  4/24/2019 0506 by Alfred Morris RN  Outcome: Ongoing     Problem: SAFETY  Goal: LTG - patient will adhere to hip precautions during ADL's and transfers  4/24/2019 1026 by La Sykes RN  Outcome: Ongoing  4/24/2019 0506 by Alfred Morris RN  Outcome: Ongoing  Goal: LTG - Patient will demonstrate safety requirements appropriate to situation/environment  4/24/2019 1026 by La Sykes RN  Outcome: Ongoing  4/24/2019 0506 by Alfred Morris RN  Outcome: Ongoing  Goal: LTG - patient will utilize safety techniques  4/24/2019 1026 by La Sykes RN  Outcome: Ongoing  4/24/2019 0506 by Alfred Morris RN  Outcome: Ongoing  Goal: STG - patient locks brakes on wheelchair  4/24/2019 1026 by La Sykes RN  Outcome: Ongoing  4/24/2019 0506 by Alfred Morris RN  Outcome: Ongoing  Goal: STG - Patient uses call light consistently to request assistance with transfers  4/24/2019 1026 by La Sykes RN  Outcome: Ongoing  4/24/2019 0506 by Alfred Morris RN  Outcome: Ongoing  Goal: STG - patient uses gait belt during all transfers  4/24/2019 1026 by Kael Gagnon RN  Outcome: Ongoing  4/24/2019 0506 by Bruce Saenz RN  Outcome: Ongoing     Problem: Fluid Volume - Imbalance:  Goal: Absence of imbalanced fluid volume signs and symptoms  Description  Absence of imbalanced fluid volume signs and symptoms  4/24/2019 1026 by Kael Gagnon RN  Outcome: Ongoing  4/24/2019 0506 by Bruce Saenz RN  Outcome: Ongoing     Problem: Nausea/Vomiting:  Goal: Absence of nausea/vomiting  Description  Absence of nausea/vomiting  4/24/2019 1026 by Kael Gagnon RN  Outcome: Ongoing  4/24/2019 0506 by Bruce Saenz RN  Outcome: Ongoing  Goal: Able to drink  Description  Able to drink  4/24/2019 1026 by Kael Gagnon RN  Outcome: Ongoing  4/24/2019 0506 by Bruce Saenz RN  Outcome: Ongoing  Goal: Able to eat  Description  Able to eat  4/24/2019 1026 by Kael Gagnon RN  Outcome: Ongoing  4/24/2019 0506 by Bruce Saenz RN  Outcome: Ongoing  Goal: Ability to achieve adequate nutritional intake will improve  Description  Ability to achieve adequate nutritional intake will improve  4/24/2019 1026 by Kael Gagnon RN  Outcome: Ongoing  4/24/2019 0506 by Bruce Saenz RN  Outcome: Ongoing

## 2019-04-24 NOTE — PROGRESS NOTES
[facility]    Gastroenterology Daily Progress Note      Patient:   Belgica Mcnamara   :    1950   Facility:   [facility]  Date:     2019  Consultant:   Kiera Salas, CNP      SUBJECTIVE  71 y.o. male admitted 2019 with Dehydration [E86.0] and seen for nausea and constipation. The pt was seen and examined. He did have a non bloody bowel movement yesterday. He denies nausea and abdominal pain today. He is tolerating food. OBJECTIVE  Scheduled Meds:   enoxaparin  40 mg Subcutaneous Daily    sodium chloride flush  10 mL Intravenous 2 times per day    ciprofloxacin  400 mg Intravenous Q12H    metroNIDAZOLE  500 mg Intravenous Q8H    pantoprazole  40 mg Oral QAM AC    amLODIPine  10 mg Oral Daily       Vital Signs:  BP (!) 136/59   Pulse 72   Temp 98.2 °F (36.8 °C) (Oral)   Resp 16   Ht 5' 8\" (1.727 m)   Wt 231 lb 6.4 oz (105 kg)   SpO2 97%   BMI 35.18 kg/m²      Physical Exam:   General appearance: Alert & oriented, NAD  Lungs: CTA bilaterally    Heart: S1S2 RRR  Abdomen: Soft, Nontender, Not distended, BS WNL  Skin: No jaundice, No clubbing, No cyanosis    Lab and Imaging Review     CBC  Recent Labs     19  1050 19  0448   WBC 10.0 6.8   HGB 14.5 11.5*   HCT 44.5 35.9*   MCV 88.5 88.4    168       BMP  Recent Labs     19  1050 19  0448    143   K 3.9 3.8    108*   CO2 23 25   BUN 36* 17   CREATININE 1.53* 1.13   GLUCOSE 112* 88   CALCIUM 8.9 8.0*       LFTS  Recent Labs     19  1050   ALKPHOS 105   ALT 77*   AST 34   PROT 7.9   BILITOT 0.32   BILIDIR 0.10   LABALBU 4.1       AMYLASE/LIPASE/AMMONIA  Recent Labs     19  1050   AMYLASE 96   LIPASE 73*     FINDINGS:kub 19   Chest:  Cardiomediastinal silhouette and pulmonary vasculature are stable.    No focal airspace consolidation, pneumothorax, or pleural effusion.  No free   air beneath the diaphragm.  No evidence of acute osseous abnormality.     Abdomen:  There are scattered air-fluid levels in the small bowel.  No   abnormal bowel distention is seen.  Stool burden is within normal limits, as   visualized.  No free air.  No evidence of acute osseous abnormality.  Right   hip arthroplasty is noted.  There are moderate degenerative changes in the   left hip.  No abnormal calcifications are seen.           Impression   1.  No acute intrathoracic process.       2.  Scattered air-fluid levels without abnormal bowel distention, suggesting   mild ileus or enteritis.  No definite evidence of bowel obstruction at this   time.  No free air.             FINDINGS:ct abd 4/11/19   Lower Chest: Dependent atelectasis is noted.  Bronchiectatic changes are   noted.  Vascular calcifications are noted.  Small pericardial effusion is   noted.  Small hiatal hernia is noted.       Organs: Too numerous to count low-density liver lesions are again noted.  No   gallstones are noted.  No splenic or pancreatic lesions are noted.  Slight   adrenal gland fullness is noted, left greater than right.  This is not   grossly changed.  Hyperplasia or adenoma is favored.  Low-density left renal   lesion is noted.  This is likely a small cyst.       GI/Bowel: Stool is noted throughout the colon.  Mild small-bowel distention   in the left side of the abdomen is noted with multiple air-fluid levels.    There is no pneumatosis. Doreene Eim is no bowel wall thickening.  Appendix is   normal.       Pelvis: Multiple small bowel loops are mildly dilated with fluid.  Urinary   bladder detail is significantly limited due to artifact.  There is suggested   wall thickening.  Sigmoid redundancy is noted.  There is no bowel wall   thickening in the pelvis.  Vascular calcifications are noted.       Peritoneum/Retroperitoneum: Vascular calcifications are noted. Doreene Emi is no   aneurysm.  Scattered small lymph nodes are noted. Doreene Emi is no pathologic   enlargement       Bones/Soft Tissues: Postop changes in the right hip are noted give rise to   artifact.  Degenerative changes in the left hip are noted.  SI joint   degenerative changes and lumbar spine degenerative changes are noted.  Disc   bulging at L5-S1 is noted.  Canal detail is limited           Impression   Too numerous to count low-density liver lesions are likely cysts.  These are   stable.       Stable adrenal glands.       Multifocal small-bowel distention with fluid levels suggesting ileus. Nonspecific enteritis is possible.  Mesenteric vessels are patent.       No bowel wall thickening.             ASSESSMENT/PLAN:  1. Nausea with constipation; improved  -requesting another enema tonight which has been ordered  -diet as tolerated  -no need for antibiotics from a GI standpoint  -if he does well overnight consider discharge tomorrow with outpatient follow up              This plan was formulated in collaboration with Dr. Feng Ramos.     Electronically signed by: ROSIE Mendoza CNP on 4/24/2019 at 12:43 PM

## 2019-04-24 NOTE — PROGRESS NOTES
Per GI standpoint, pt is okay for discharge. ATB were discontinued. 1 tap water enema ordered. Per pt, pt requested enema around 2200 tonight.

## 2019-04-24 NOTE — PROGRESS NOTES
Discussed with nurse  Patient had an enema last night. Had a BM and feels better  Tolerating diet  Abdomen less distended.  Soft, +BS    GI input appreciated  No surgical intervention at this time  Dr. Jonnie Emmanuel is providing coverage Thursday through Sunday

## 2019-04-25 VITALS
RESPIRATION RATE: 16 BRPM | OXYGEN SATURATION: 98 % | DIASTOLIC BLOOD PRESSURE: 59 MMHG | HEART RATE: 66 BPM | TEMPERATURE: 97.9 F | HEIGHT: 68 IN | BODY MASS INDEX: 35.07 KG/M2 | WEIGHT: 231.4 LBS | SYSTOLIC BLOOD PRESSURE: 127 MMHG

## 2019-04-25 PROBLEM — K59.01 SLOW TRANSIT CONSTIPATION: Status: ACTIVE | Noted: 2019-04-25

## 2019-04-25 LAB
ABSOLUTE EOS #: 0.3 K/UL (ref 0–0.44)
ABSOLUTE IMMATURE GRANULOCYTE: 0.02 K/UL (ref 0–0.3)
ABSOLUTE LYMPH #: 1.9 K/UL (ref 1.1–3.7)
ABSOLUTE MONO #: 0.65 K/UL (ref 0.1–1.2)
ANION GAP SERPL CALCULATED.3IONS-SCNC: 10 MMOL/L (ref 9–17)
BASOPHILS # BLD: 0 % (ref 0–2)
BASOPHILS ABSOLUTE: <0.03 K/UL (ref 0–0.2)
BUN BLDV-MCNC: 11 MG/DL (ref 8–23)
BUN/CREAT BLD: 10 (ref 9–20)
CALCIUM SERPL-MCNC: 8 MG/DL (ref 8.6–10.4)
CHLORIDE BLD-SCNC: 109 MMOL/L (ref 98–107)
CO2: 23 MMOL/L (ref 20–31)
CREAT SERPL-MCNC: 1.05 MG/DL (ref 0.7–1.2)
DIFFERENTIAL TYPE: ABNORMAL
EOSINOPHILS RELATIVE PERCENT: 5 % (ref 1–4)
GFR AFRICAN AMERICAN: >60 ML/MIN
GFR NON-AFRICAN AMERICAN: >60 ML/MIN
GFR SERPL CREATININE-BSD FRML MDRD: ABNORMAL ML/MIN/{1.73_M2}
GFR SERPL CREATININE-BSD FRML MDRD: ABNORMAL ML/MIN/{1.73_M2}
GLUCOSE BLD-MCNC: 99 MG/DL (ref 70–99)
HCT VFR BLD CALC: 34.5 % (ref 40.7–50.3)
HEMOGLOBIN: 10.6 G/DL (ref 13–17)
IMMATURE GRANULOCYTES: 0 %
LYMPHOCYTES # BLD: 33 % (ref 24–43)
MCH RBC QN AUTO: 28 PG (ref 25.2–33.5)
MCHC RBC AUTO-ENTMCNC: 30.7 G/DL (ref 28.4–34.8)
MCV RBC AUTO: 91.3 FL (ref 82.6–102.9)
MONOCYTES # BLD: 11 % (ref 3–12)
NRBC AUTOMATED: 0 PER 100 WBC
PDW BLD-RTO: 15.6 % (ref 11.8–14.4)
PLATELET # BLD: 167 K/UL (ref 138–453)
PLATELET ESTIMATE: ABNORMAL
PMV BLD AUTO: 10.9 FL (ref 8.1–13.5)
POTASSIUM SERPL-SCNC: 3.6 MMOL/L (ref 3.7–5.3)
RBC # BLD: 3.78 M/UL (ref 4.21–5.77)
RBC # BLD: ABNORMAL 10*6/UL
SEG NEUTROPHILS: 51 % (ref 36–65)
SEGMENTED NEUTROPHILS ABSOLUTE COUNT: 2.96 K/UL (ref 1.5–8.1)
SODIUM BLD-SCNC: 142 MMOL/L (ref 135–144)
WBC # BLD: 5.9 K/UL (ref 3.5–11.3)
WBC # BLD: ABNORMAL 10*3/UL

## 2019-04-25 PROCEDURE — 6370000000 HC RX 637 (ALT 250 FOR IP): Performed by: INTERNAL MEDICINE

## 2019-04-25 PROCEDURE — 99238 HOSP IP/OBS DSCHRG MGMT 30/<: CPT | Performed by: INTERNAL MEDICINE

## 2019-04-25 PROCEDURE — 6370000000 HC RX 637 (ALT 250 FOR IP): Performed by: FAMILY MEDICINE

## 2019-04-25 PROCEDURE — 80048 BASIC METABOLIC PNL TOTAL CA: CPT

## 2019-04-25 PROCEDURE — 6360000002 HC RX W HCPCS: Performed by: FAMILY MEDICINE

## 2019-04-25 PROCEDURE — 6360000002 HC RX W HCPCS: Performed by: INTERNAL MEDICINE

## 2019-04-25 PROCEDURE — 90732 PPSV23 VACC 2 YRS+ SUBQ/IM: CPT | Performed by: INTERNAL MEDICINE

## 2019-04-25 PROCEDURE — 85025 COMPLETE CBC W/AUTO DIFF WBC: CPT

## 2019-04-25 PROCEDURE — 6370000000 HC RX 637 (ALT 250 FOR IP): Performed by: SURGERY

## 2019-04-25 PROCEDURE — G0009 ADMIN PNEUMOCOCCAL VACCINE: HCPCS | Performed by: INTERNAL MEDICINE

## 2019-04-25 PROCEDURE — 36415 COLL VENOUS BLD VENIPUNCTURE: CPT

## 2019-04-25 RX ORDER — POTASSIUM CHLORIDE 20 MEQ/1
40 TABLET, EXTENDED RELEASE ORAL PRN
Status: DISCONTINUED | OUTPATIENT
Start: 2019-04-25 | End: 2019-04-25 | Stop reason: HOSPADM

## 2019-04-25 RX ORDER — MAGNESIUM SULFATE 1 G/100ML
1 INJECTION INTRAVENOUS PRN
Status: DISCONTINUED | OUTPATIENT
Start: 2019-04-25 | End: 2019-04-25 | Stop reason: HOSPADM

## 2019-04-25 RX ORDER — POTASSIUM CHLORIDE 7.45 MG/ML
10 INJECTION INTRAVENOUS PRN
Status: DISCONTINUED | OUTPATIENT
Start: 2019-04-25 | End: 2019-04-25 | Stop reason: HOSPADM

## 2019-04-25 RX ORDER — DOCUSATE SODIUM 100 MG/1
100 CAPSULE, LIQUID FILLED ORAL DAILY
Status: DISCONTINUED | OUTPATIENT
Start: 2019-04-25 | End: 2019-04-25 | Stop reason: HOSPADM

## 2019-04-25 RX ORDER — POLYETHYLENE GLYCOL 3350 17 G/17G
17 POWDER, FOR SOLUTION ORAL DAILY
Qty: 510 G | Refills: 5 | Status: SHIPPED | OUTPATIENT
Start: 2019-04-25 | End: 2019-05-25

## 2019-04-25 RX ADMIN — POTASSIUM CHLORIDE 40 MEQ: 20 TABLET, EXTENDED RELEASE ORAL at 08:54

## 2019-04-25 RX ADMIN — ENOXAPARIN SODIUM 40 MG: 40 INJECTION SUBCUTANEOUS at 08:48

## 2019-04-25 RX ADMIN — AMLODIPINE BESYLATE 10 MG: 10 TABLET ORAL at 08:48

## 2019-04-25 RX ADMIN — PNEUMOCOCCAL VACCINE POLYVALENT 0.5 ML
25; 25; 25; 25; 25; 25; 25; 25; 25; 25; 25; 25; 25; 25; 25; 25; 25; 25; 25; 25; 25; 25; 25 INJECTION, SOLUTION INTRAMUSCULAR; SUBCUTANEOUS at 11:15

## 2019-04-25 RX ADMIN — PANTOPRAZOLE SODIUM 40 MG: 40 TABLET, DELAYED RELEASE ORAL at 05:56

## 2019-04-25 RX ADMIN — DOCUSATE SODIUM 100 MG: 100 CAPSULE, LIQUID FILLED ORAL at 08:48

## 2019-04-25 NOTE — PROGRESS NOTES
promethazine, sodium chloride flush, acetaminophen, oxyCODONE-acetaminophen, docusate sodium    Data:     Past Medical History:   has a past medical history of Arthritis, BPH (benign prostatic hyperplasia), Candida esophagitis ([facility] [address] Esophageal hiatus hernia, GERD (gastroesophageal reflux disease), History of dysphagia, History of esophageal stricture, History of radiation therapy, Prostate cancer ([facility] [address] Sleep apnea, and Smoker. Social History:   reports that he has been smoking cigarettes. He has a 30.00 pack-year smoking history. He has never used smokeless tobacco. He reports that he does not drink alcohol or use drugs. Family History:   Family History   Problem Relation Age of Onset    Other Mother         breathing problems    High Blood Pressure Father        Vitals:  BP (!) 127/59   Pulse 66   Temp 97.9 °F (36.6 °C) (Oral)   Resp 16   Ht 5' 8\" (1.727 m)   Wt 231 lb 6.4 oz (105 kg)   SpO2 98%   BMI 35.18 kg/m²   Temp (24hrs), Av.9 °F (36.6 °C), Min:97.2 °F (36.2 °C), Max:98.4 °F (36.9 °C)    No results for input(s): POCGLU in the last 72 hours. I/O (24Hr):     Intake/Output Summary (Last 24 hours) at 2019 0933  Last data filed at 2019 0525  Gross per 24 hour   Intake 1490 ml   Output --   Net 1490 ml       Labs:    Hematology:  Recent Labs     19  1050 19  0448 19  0614   WBC 10.0 6.8 5.9   RBC 5.03 4.06* 3.78*   HGB 14.5 11.5* 10.6*   HCT 44.5 35.9* 34.5*   MCV 88.5 88.4 91.3   MCH 28.8 28.4 28.0   MCHC 32.5 32.1 30.7   RDW 16.0* 16.2* 15.6*    168 167   MPV NOT REPORTED NOT REPORTED 10.9     Chemistry:  Recent Labs     19  1050 19  0448 19  0614    143 142   K 3.9 3.8 3.6*    108* 109*   CO2 23 25 23   GLUCOSE 112* 88 99   BUN 36* 17 11   CREATININE 1.53* 1.13 1.05   ANIONGAP 18* 10 10   LABGLOM 45* >60 >60   GFRAA 55* >60 >60   CALCIUM 8.9 8.0* 8.0*     Recent Labs     19  1050   PROT 7.9   LABALBU 4.1   AST 34 ALT 77*   ALKPHOS 105   BILITOT 0.32   BILIDIR 0.10   AMYLASE 96   LIPASE 73*         Lab Results   Component Value Date/Time    SPECIAL NOT REPORTED 05/29/2018 08:56 AM     Lab Results   Component Value Date/Time    CULTURE  02/13/2017 08:59 AM     INAPPROPRIATE SPECIMEN FOR FUNGAL CULTURE [facility] GI UNIT NOTIFIED 2/13 0855    CULTURE  02/13/2017 08:59 AM     [employer] [address] [facility], [address] [place] [address] [phone]       No results found for: POCPH, PHART, PH, POCPCO2, DHY2KFR, PCO2, POCPO2, PO2ART, PO2, POCHCO3, FMQ6QFD, HCO3, NBEA, PBEA, BEART, BE, THGBART, THB, MYY1LXR, ADKX6FFH, U4WVHZTJ, O2SAT, FIO2    Radiology:    No new radiology reports    Physical Examination:        General appearance:  alert, cooperative and no distress  Mental Status:  oriented to person, place and time and normal affect  Lungs:  clear to auscultation bilaterally, normal effort  Heart:  regular rate and rhythm, no murmur  Abdomen:  soft, nontender, nondistended, normal bowel sounds, no masses, hepatomegaly, splenomegaly  Extremities:  no edema, redness, tenderness in the calves  Skin:  no gross lesions, rashes, induration    Assessment:        Primary Problem  Ileus [facility])    Active Hospital Problems    Diagnosis Date Noted    Slow transit constipation [K59.01] 04/25/2019    Nausea [R11.0]     Dehydration [E86.0] 04/22/2019    Ileus ([address] [K56.7] 04/12/2019       Plan:        1. Enemas as ordered per GI  2. Start Colace daily  3. Diet as tolerated  4. Stop IV fluids  5. Antiemetics as needed  6. Off antibiotics per GI recommendations  7. Continue home medications  8.  Discharge planning    Darlene Cazares DO  4/25/2019  9:33 AM

## 2019-04-25 NOTE — PROGRESS NOTES
Pt discharged to home per wc. No distress noted. miralax delivered to the room and dc instructions discussed with patient and spouse.

## 2019-04-25 NOTE — PROGRESS NOTES
Getting pt's belongings together, he will be moved to the overflow unit after 0700 for staffing purposes. Pt was agreeable to move. Kya earl was in room as I was gathering his possessions, and just before she was going to poke him, pt suddenly said he needed to get something out of the overbed table drawer. I told him that I would get it for him, opened drawer and found many saltine crackers and condiments, and also a white pill in 2 halves. I asked him what it was, he said a Percocet. I asked if it was from home, he said yes. I told him that I had to dispose of pill, pt agreed. Kya earl saw me throw pill in sharps container in room.

## 2019-04-25 NOTE — PLAN OF CARE
Pain level assessment complete. Patient educated on pain scale and control interventions  PRN pain medication given per patient request  Patient instructed to call out with new onset of pain or unrelieved pain   Pt denies c/o pain so far this shift. Siderails up x 2  Hourly rounding  Call light in reach  Instructed to call for assist as needed before attempting out of bed. Remains free from falls and accidental injury at this time   Floor free from obstacles  Bed is locked and in lowest position  Adequate lighting provided  Calls out appropriately, gait steady when oob.

## 2019-04-25 NOTE — DISCHARGE SUMMARY
[facility]    Discharge Summary     Patient ID: Irvin Glass  :  1950   MRN: [id number]     ACCOUNT:  [de-identified]   Patient's PCP: Sarnaya Craig DO  Admit Date: 2019   Discharge Date: 2019     Length of Stay: 3  Code Status:  Prior  Admitting Physician: Saranya Craig DO  Discharge Physician: Heribreto Patel DO     Active Discharge Diagnoses:     Hospital Problem Lists:  Principal Problem:    Ileus [facility])  Active Problems:    Dehydration    Nausea    Slow transit constipation    ROLY (acute kidney injury) ([facility]  Resolved Problems:    * No resolved hospital problems. *      Admission Condition:  fair     Discharged Condition: stable    Hospital Stay:     Hospital Course:  Irvin Glass is a 71 y.o. male who was admitted for the management of   Ileus ([facility] , presented to ER with Abdominal Pain (over 1 month)    This is a 51-year-old male who presents with a complaint of abdominal pain has been intermittent over the last month. His found have evidence of ileus was admitted to the hospital for bowel rest and IV hydration for dehydration. There was concern raised for possible colitis he was initially treated with Cipro and Flagyl after 40 hours this was discontinued by the GI service. His ileus and constipation issues are resolved with enemas and his diet was advanced.       Significant therapeutic interventions: As above    Significant Diagnostic Studies:   Labs / Micro:  CBC:   Lab Results   Component Value Date    WBC 5.9 2019    RBC 3.78 2019    HGB 10.6 2019    HCT 34.5 2019    MCV 91.3 2019    MCH 28.0 2019    MCHC 30.7 2019    RDW 15.6 2019     2019     BMP:    Lab Results   Component Value Date    GLUCOSE 99 2019     2019    K 3.6 2019     2019    CO2 23 2019    ANIONGAP 10 2019    BUN 11 2019    CREATININE 1.05 04/25/2019    BUNCRER 10 04/25/2019    CALCIUM 8.0 04/25/2019    LABGLOM >60 04/25/2019    GFRAA >60 04/25/2019    GFR      04/25/2019    GFR NOT REPORTED 04/25/2019         Radiology:  Xr Chest Standard (2 Vw)    Result Date: 4/4/2019  EXAMINATION: TWO VIEWS OF THE CHEST 4/4/2019 10:22 am COMPARISON: 24 August 2017 HISTORY: ORDERING SYSTEM PROVIDED HISTORY: chest pain TECHNOLOGIST PROVIDED HISTORY: chest pain FINDINGS: Overlying cardiac monitoring electrodes are present. No cardiomegaly, vascular congestion effusion or pneumothorax is noted. Strand-like opacities are present at the lighting bases consistent with mild atelectasis. Osseous and mediastinal structures are age-appropriate. Mild atelectasis. Otherwise unremarkable study. Xr Acute Abd Series Chest 1 Vw    Result Date: 4/22/2019  EXAMINATION: TWO XRAY VIEWS OF THE ABDOMEN AND SINGLE  XRAY VIEW OF THE CHEST 4/22/2019 10:37 am COMPARISON: CT abdomen and pelvis dated 4/11/2019, chest x-ray dated 4/4/2019. HISTORY: ORDERING SYSTEM PROVIDED HISTORY: pain TECHNOLOGIST PROVIDED HISTORY: pain Ordering Physician Provided Reason for Exam: constipation Acuity: Acute FINDINGS: Chest:  Cardiomediastinal silhouette and pulmonary vasculature are stable. No focal airspace consolidation, pneumothorax, or pleural effusion. No free air beneath the diaphragm. No evidence of acute osseous abnormality. Abdomen: There are scattered air-fluid levels in the small bowel. No abnormal bowel distention is seen. Stool burden is within normal limits, as visualized. No free air. No evidence of acute osseous abnormality. Right hip arthroplasty is noted. There are moderate degenerative changes in the left hip. No abnormal calcifications are seen. 1.  No acute intrathoracic process. 2.  Scattered air-fluid levels without abnormal bowel distention, suggesting mild ileus or enteritis. No definite evidence of bowel obstruction at this time. No free air.      Ct Abdomen Pelvis W Iv Contrast Additional Contrast? None    Result Date: 4/11/2019  EXAMINATION: CT OF THE ABDOMEN AND PELVIS WITH CONTRAST 4/11/2019 9:57 pm TECHNIQUE: CT of the abdomen and pelvis was performed with the administration of intravenous contrast. Multiplanar reformatted images are provided for review. Dose modulation, iterative reconstruction, and/or weight based adjustment of the mA/kV was utilized to reduce the radiation dose to as low as reasonably achievable. COMPARISON: May 2015 HISTORY: ORDERING SYSTEM PROVIDED HISTORY: nausea and constipation for last 2 weeks despite 2 enemas. TECHNOLOGIST PROVIDED HISTORY: Ordering Physician Provided Reason for Exam: nausea x 2 weeks Acuity: Unknown Type of Exam: Unknown Additional signs and symptoms: constipation x 2 weeks FINDINGS: Lower Chest: Dependent atelectasis is noted. Bronchiectatic changes are noted. Vascular calcifications are noted. Small pericardial effusion is noted. Small hiatal hernia is noted. Organs: Too numerous to count low-density liver lesions are again noted. No gallstones are noted. No splenic or pancreatic lesions are noted. Slight adrenal gland fullness is noted, left greater than right. This is not grossly changed. Hyperplasia or adenoma is favored. Low-density left renal lesion is noted. This is likely a small cyst. GI/Bowel: Stool is noted throughout the colon. Mild small-bowel distention in the left side of the abdomen is noted with multiple air-fluid levels. There is no pneumatosis. There is no bowel wall thickening. Appendix is normal. Pelvis: Multiple small bowel loops are mildly dilated with fluid. Urinary bladder detail is significantly limited due to artifact. There is suggested wall thickening. Sigmoid redundancy is noted. There is no bowel wall thickening in the pelvis. Vascular calcifications are noted. Peritoneum/Retroperitoneum: Vascular calcifications are noted. There is no aneurysm.   Scattered small lymph nodes are noted. There is no pathologic enlargement Bones/Soft Tissues: Postop changes in the right hip are noted give rise to artifact. Degenerative changes in the left hip are noted. SI joint degenerative changes and lumbar spine degenerative changes are noted. Disc bulging at L5-S1 is noted. Canal detail is limited     Too numerous to count low-density liver lesions are likely cysts. These are stable. Stable adrenal glands. Multifocal small-bowel distention with fluid levels suggesting ileus. Nonspecific enteritis is possible. Mesenteric vessels are patent. No bowel wall thickening. Consultations:    Consults:     Final Specialist Recommendations/Findings:   IP CONSULT TO HOSPITALIST  IP CONSULT TO GENERAL SURGERY  IP CONSULT TO GI      The patient was seen and examined on day of discharge and this discharge summary is in conjunction with any daily progress note from day of discharge.     Discharge plan:     Disposition: Home    Physician Follow Up:   Korin Kaiser DO  [address] [facility]  [phone]    Go on 4/30/2019  Appointment 4-30-19 at 1:15pm    Dede Casarez MD  [address] [place] [id number] [phone]    On 6/26/2019  0915       Requiring Further Evaluation/Follow Up POST HOSPITALIZATION/Incidental Findings: None    Diet: regular diet    Activity: As tolerated    Instructions to Patient: Take medication as prescribed    Discharge Medications:      Medication List      START taking these medications    polyethylene glycol powder  Commonly known as:  GLYCOLAX  Take 17 g by mouth daily        CONTINUE taking these medications    amLODIPine 10 MG tablet  Commonly known as:  NORVASC  Take 1 tablet by mouth daily     omeprazole 40 MG delayed release capsule  Commonly known as:  PRILOSEC     promethazine 25 MG tablet  Commonly known as:  PHENERGAN     STOOL SOFTENER PO           Where to Get Your Medications      These medications were sent to [address] [facility]. [place] [address]    Hours:  24-hours Phone:  [phone]   · polyethylene glycol powder         Time Spent on discharge is  26 minutes in patient examination, evaluation, counseling as well as medication reconciliation, prescriptions for required medications, discharge plan and follow up. Electronically signed by   Angelo Vick DO  5/1/2019  6:30 AM      Thank you Dr. Tasha Petit DO for the opportunity to be involved in this patient's care.

## 2019-04-25 NOTE — PROGRESS NOTES
CLINICAL PHARMACY NOTE: MEDS TO [address] [employer] [facility] Patient?: No  Total # of Prescriptions Filled: 1   The following medications were delivered to the patient:  · polyethylene glycol  Total # of Interventions Completed: 1  Time Spent (min): 15    Additional Documentation:    Pt was prescribed miralax. rx was sent to [facility] but just sold pt the OTC bottle. Total came out to $20.37.  Pt paid with a card

## 2019-04-25 NOTE — CARE COORDINATION
Hospital F/U appointment scheduled with Dr. Linnea Tobin 4-30-19 at 1:15pm and pt informed.
yesterday shows mild ileus or enteritis. Surgery and GI consulted. Pt follows with Dr. Cahntel Joyce for esophageal stricture and states has dilatation several times a year at [facility]. V's. Pt verbalizes frustration due to frequent admissions and does not want to be discharged until issue is resolved. Continue to follow GI and surgeries plan of care.         Electronically signed by Ignacio Jc RN on 4/23/19 at 1:04 PM

## 2019-04-25 NOTE — PLAN OF CARE
Patient is a fall risk during this admission. Fall risk assessment was performed. Patient is absent of falls. Bed is in the lowest position. Wheels on the bed are locked. Call light and bed side table are within reach. Clutter is removed. Patient was educated to call out when needing assistance or wanting to get out of bed. Patient offered toileting assistance during rounding. Hourly rounds have been performed. Pt medicated with pain medication prn. Assessed all pain characteristics including level, type, location, frequency, and onset. Non-pharmacologic interventions offered to pt as well. Pt states pain is tolerable at this time.  Will continue to monitor

## 2019-05-01 PROBLEM — N17.9 AKI (ACUTE KIDNEY INJURY) (HCC): Status: ACTIVE | Noted: 2019-05-01

## 2019-05-07 RX ORDER — AMLODIPINE BESYLATE 10 MG/1
TABLET ORAL
Qty: 30 TABLET | Refills: 0 | OUTPATIENT
Start: 2019-05-07

## 2019-05-25 PROBLEM — E86.0 DEHYDRATION: Status: RESOLVED | Noted: 2019-04-22 | Resolved: 2019-05-25

## 2019-09-17 ENCOUNTER — HOSPITAL ENCOUNTER (OUTPATIENT)
Age: 69
Setting detail: OUTPATIENT SURGERY
Discharge: HOME | End: 2019-09-17
Attending: INTERNAL MEDICINE | Admitting: INTERNAL MEDICINE
Payer: MEDICARE

## 2019-09-17 ENCOUNTER — ANESTHESIA (OUTPATIENT)
Dept: ENDOSCOPY | Age: 69
End: 2019-09-17
Payer: MEDICARE

## 2019-09-17 ENCOUNTER — ANESTHESIA EVENT (OUTPATIENT)
Dept: ENDOSCOPY | Age: 69
End: 2019-09-17
Payer: MEDICARE

## 2019-09-17 VITALS
SYSTOLIC BLOOD PRESSURE: 158 MMHG | OXYGEN SATURATION: 99 % | DIASTOLIC BLOOD PRESSURE: 81 MMHG | RESPIRATION RATE: 24 BRPM

## 2019-09-17 VITALS
SYSTOLIC BLOOD PRESSURE: 136 MMHG | BODY MASS INDEX: 33.64 KG/M2 | RESPIRATION RATE: 26 BRPM | HEART RATE: 76 BPM | TEMPERATURE: 98 F | WEIGHT: 235 LBS | DIASTOLIC BLOOD PRESSURE: 69 MMHG | HEIGHT: 70 IN | OXYGEN SATURATION: 97 %

## 2019-09-17 PROCEDURE — 2709999900 HC NON-CHARGEABLE SUPPLY: Performed by: INTERNAL MEDICINE

## 2019-09-17 PROCEDURE — 2580000003 HC RX 258: Performed by: INTERNAL MEDICINE

## 2019-09-17 PROCEDURE — 3700000000 HC ANESTHESIA ATTENDED CARE: Performed by: INTERNAL MEDICINE

## 2019-09-17 PROCEDURE — 6360000002 HC RX W HCPCS: Performed by: NURSE ANESTHETIST, CERTIFIED REGISTERED

## 2019-09-17 PROCEDURE — 7100000011 HC PHASE II RECOVERY - ADDTL 15 MIN: Performed by: INTERNAL MEDICINE

## 2019-09-17 PROCEDURE — 2500000003 HC RX 250 WO HCPCS: Performed by: NURSE ANESTHETIST, CERTIFIED REGISTERED

## 2019-09-17 PROCEDURE — 3700000001 HC ADD 15 MINUTES (ANESTHESIA): Performed by: INTERNAL MEDICINE

## 2019-09-17 PROCEDURE — 3609012700 HC EGD DILATION SAVORY: Performed by: INTERNAL MEDICINE

## 2019-09-17 PROCEDURE — 7100000010 HC PHASE II RECOVERY - FIRST 15 MIN: Performed by: INTERNAL MEDICINE

## 2019-09-17 RX ORDER — GLYCOPYRROLATE 1 MG/5 ML
SYRINGE (ML) INTRAVENOUS PRN
Status: DISCONTINUED | OUTPATIENT
Start: 2019-09-17 | End: 2019-09-17 | Stop reason: SDUPTHER

## 2019-09-17 RX ORDER — LIDOCAINE HYDROCHLORIDE 10 MG/ML
INJECTION, SOLUTION EPIDURAL; INFILTRATION; INTRACAUDAL; PERINEURAL PRN
Status: DISCONTINUED | OUTPATIENT
Start: 2019-09-17 | End: 2019-09-17 | Stop reason: SDUPTHER

## 2019-09-17 RX ORDER — PROPOFOL 10 MG/ML
INJECTION, EMULSION INTRAVENOUS PRN
Status: DISCONTINUED | OUTPATIENT
Start: 2019-09-17 | End: 2019-09-17 | Stop reason: SDUPTHER

## 2019-09-17 RX ORDER — SODIUM CHLORIDE 9 MG/ML
INJECTION, SOLUTION INTRAVENOUS CONTINUOUS
Status: DISCONTINUED | OUTPATIENT
Start: 2019-09-17 | End: 2019-09-17 | Stop reason: HOSPADM

## 2019-09-17 RX ADMIN — SODIUM CHLORIDE 30 ML: 9 INJECTION, SOLUTION INTRAVENOUS at 09:33

## 2019-09-17 RX ADMIN — PROPOFOL 30 MG: 10 INJECTION, EMULSION INTRAVENOUS at 10:38

## 2019-09-17 RX ADMIN — PROPOFOL 120 MG: 10 INJECTION, EMULSION INTRAVENOUS at 10:32

## 2019-09-17 RX ADMIN — LIDOCAINE HYDROCHLORIDE 50 MG: 10 INJECTION, SOLUTION EPIDURAL; INFILTRATION; INTRACAUDAL at 10:32

## 2019-09-17 RX ADMIN — PROPOFOL 50 MG: 10 INJECTION, EMULSION INTRAVENOUS at 10:40

## 2019-09-17 RX ADMIN — Medication 0.2 MG: at 10:32

## 2019-09-17 ASSESSMENT — PAIN SCALES - GENERAL
PAINLEVEL_OUTOF10: 0

## 2019-09-17 ASSESSMENT — PAIN - FUNCTIONAL ASSESSMENT: PAIN_FUNCTIONAL_ASSESSMENT: 0-10

## 2019-09-17 ASSESSMENT — LIFESTYLE VARIABLES: SMOKING_STATUS: 1

## 2019-09-17 NOTE — H&P
History of ileus (04/2019), History of radiation therapy, Poor dentition, Prostate cancer ([facility] [address] (04/2017), Sleep apnea, and Smoker. PLANS:   1.  EGD with dilation    PIEDAD MAR PA-C  Electronically signed 9/17/2019 at 9:34 AM

## 2019-11-12 ENCOUNTER — HOSPITAL ENCOUNTER (OUTPATIENT)
Age: 69
Setting detail: OUTPATIENT SURGERY
Discharge: HOME | End: 2019-11-12
Attending: INTERNAL MEDICINE | Admitting: INTERNAL MEDICINE
Payer: MEDICARE

## 2019-11-12 ENCOUNTER — ANESTHESIA EVENT (OUTPATIENT)
Dept: ENDOSCOPY | Age: 69
End: 2019-11-12
Payer: MEDICARE

## 2019-11-12 ENCOUNTER — ANESTHESIA (OUTPATIENT)
Dept: ENDOSCOPY | Age: 69
End: 2019-11-12
Payer: MEDICARE

## 2019-11-12 VITALS
HEART RATE: 79 BPM | DIASTOLIC BLOOD PRESSURE: 85 MMHG | TEMPERATURE: 97.1 F | RESPIRATION RATE: 20 BRPM | BODY MASS INDEX: 34.36 KG/M2 | SYSTOLIC BLOOD PRESSURE: 152 MMHG | HEIGHT: 70 IN | WEIGHT: 240 LBS | OXYGEN SATURATION: 97 %

## 2019-11-12 VITALS — RESPIRATION RATE: 25 BRPM | DIASTOLIC BLOOD PRESSURE: 82 MMHG | SYSTOLIC BLOOD PRESSURE: 112 MMHG

## 2019-11-12 PROCEDURE — 7100000010 HC PHASE II RECOVERY - FIRST 15 MIN: Performed by: INTERNAL MEDICINE

## 2019-11-12 PROCEDURE — 2500000003 HC RX 250 WO HCPCS: Performed by: NURSE ANESTHETIST, CERTIFIED REGISTERED

## 2019-11-12 PROCEDURE — 6360000002 HC RX W HCPCS: Performed by: NURSE ANESTHETIST, CERTIFIED REGISTERED

## 2019-11-12 PROCEDURE — 7100000011 HC PHASE II RECOVERY - ADDTL 15 MIN: Performed by: INTERNAL MEDICINE

## 2019-11-12 PROCEDURE — 2709999900 HC NON-CHARGEABLE SUPPLY: Performed by: INTERNAL MEDICINE

## 2019-11-12 PROCEDURE — 2580000003 HC RX 258: Performed by: NURSE ANESTHETIST, CERTIFIED REGISTERED

## 2019-11-12 PROCEDURE — 2580000003 HC RX 258: Performed by: INTERNAL MEDICINE

## 2019-11-12 PROCEDURE — 3609017700 HC EGD DILATION GASTRIC/DUODENAL STRICTURE: Performed by: INTERNAL MEDICINE

## 2019-11-12 PROCEDURE — 3700000000 HC ANESTHESIA ATTENDED CARE: Performed by: INTERNAL MEDICINE

## 2019-11-12 RX ORDER — PROPOFOL 10 MG/ML
INJECTION, EMULSION INTRAVENOUS PRN
Status: DISCONTINUED | OUTPATIENT
Start: 2019-11-12 | End: 2019-11-12 | Stop reason: SDUPTHER

## 2019-11-12 RX ORDER — SODIUM CHLORIDE 9 MG/ML
INJECTION, SOLUTION INTRAVENOUS CONTINUOUS PRN
Status: DISCONTINUED | OUTPATIENT
Start: 2019-11-12 | End: 2019-11-12 | Stop reason: SDUPTHER

## 2019-11-12 RX ORDER — LIDOCAINE HYDROCHLORIDE 10 MG/ML
INJECTION, SOLUTION INFILTRATION; PERINEURAL PRN
Status: DISCONTINUED | OUTPATIENT
Start: 2019-11-12 | End: 2019-11-12 | Stop reason: SDUPTHER

## 2019-11-12 RX ORDER — SODIUM CHLORIDE 9 MG/ML
INJECTION, SOLUTION INTRAVENOUS CONTINUOUS
Status: DISCONTINUED | OUTPATIENT
Start: 2019-11-12 | End: 2019-11-12 | Stop reason: HOSPADM

## 2019-11-12 RX ADMIN — SODIUM CHLORIDE: 9 INJECTION, SOLUTION INTRAVENOUS at 09:55

## 2019-11-12 RX ADMIN — LIDOCAINE HYDROCHLORIDE 100 MG: 10 INJECTION, SOLUTION INFILTRATION; PERINEURAL at 10:03

## 2019-11-12 RX ADMIN — SODIUM CHLORIDE: 9 INJECTION, SOLUTION INTRAVENOUS at 09:36

## 2019-11-12 RX ADMIN — SODIUM CHLORIDE: 9 INJECTION, SOLUTION INTRAVENOUS at 09:30

## 2019-11-12 RX ADMIN — PROPOFOL 150 MG: 10 INJECTION, EMULSION INTRAVENOUS at 10:03

## 2019-11-12 ASSESSMENT — PAIN SCALES - GENERAL
PAINLEVEL_OUTOF10: 0

## 2019-11-12 ASSESSMENT — LIFESTYLE VARIABLES: SMOKING_STATUS: 1

## 2019-11-12 ASSESSMENT — PAIN - FUNCTIONAL ASSESSMENT: PAIN_FUNCTIONAL_ASSESSMENT: 0-10

## 2020-04-01 NOTE — PROGRESS NOTES
[facility]    Progress Note    4/13/2019    9:53 AM    Name:   James Dugan  MRN:     [id number]     Acct:      [de-identified]   Room:   [address] [place] [address] Day:  1  Admit Date:  4/11/2019  8:15 PM    PCP:   Jayda Reyes DO  Code Status:  Full Code    Subjective:     C/C:   Chief Complaint   Patient presents with    Nausea    Constipation    Back Pain     Interval History Status:  Improved  Up to chair  Hungry  Wants more food  Feels constipated  Has had flatus  No stool  BP still labile    Database updates:  Calcium 8.5Low   Glucose 113High       Brief History:     The patient is a 71 y.o. male who presents with:   Nausea; Constipation; and Back Pain     Patient was admitted thru ER with:  \"Cristiano Salazar is a 71 y. o. male with past medical history of essential hypertension, esophageal stricture came to the emergency room for nausea, constipation and abdominal pain. Patient is having constipation for the last 2 week associated with  nausea and lower abdominal pain.   Patient was seen here one week back complaining of nausea, epigastric pain and constipation.  Stress test was concerning for ischemia, patient had cardiac cath which was unremarkable.  Patient was discharged home.   Per patient, he received 2 enemas at home but he did not had any bowel movement.  Patient does not have any history of constipation.  Patient denies taking start taking any new medication or herbal or over-the-counter pain. \"     59-year-old male readmitted to the hospital with nausea and abdominal pain  It appears that he was here recently - discharged on 4/5  He did undergo cardiac cath on 4/5:  Conclusions:  1. Mild non-obstructive CAD  2. Overall preserved LV function   Recommendations:  1. Medical Therapy. Start BP control; Norvasc 10 mg daily.   2. Risk Factors Modification including smoking cessation.   3. Post Cath Protocol  The patient responded to medical therapy  He states that after a couple days at home his symptoms recurred     He has been having nausea  He has passed gas but no BM  Appetites been poor     Initial database has revealed:  CT abdomen:  Impression:   Too numerous to count low-density liver lesions are likely cysts.  These are  stable. Stable adrenal glands. Multifocal small-bowel distention with fluid levels suggesting ileus. Nonspecific enteritis is possible.  Mesenteric vessels are patent. No bowel wall thickening.      Lipase 38      Pro-      WBC 8.2 k/uL RBC 4.59 m/uL Hemoglobin 12.7Low      BP has been in the 170s/90s    The patient responded to conservative treatment  Diet was progressed  Laxatives ordered  Discharge planning initiated    Past Medical History:   has a past medical history of Arthritis, BPH (benign prostatic hyperplasia), Candida esophagitis ([address] Esophageal hiatus hernia, GERD (gastroesophageal reflux disease), History of dysphagia, History of esophageal stricture, History of radiation therapy, Prostate cancer ([facility] [address] Sleep apnea, and Smoker. Social History:   reports that he has been smoking cigarettes. He has a 30.00 pack-year smoking history. He has never used smokeless tobacco. He reports that he does not drink alcohol or use drugs. Family History:   Family History   Problem Relation Age of Onset    Other Mother         breathing problems    High Blood Pressure Father        Medications:      Allergies:  No Known Allergies    Current Meds:   Scheduled Meds:   sodium chloride flush  10 mL Intravenous 2 times per day    famotidine (PEPCID) injection  20 mg Intravenous BID    enoxaparin  40 mg Subcutaneous Daily    promethazine  12.5 mg Intramuscular Once    ondansetron  8 mg Intravenous Once     Continuous Infusions:    dextrose 5% and 0.45% NaCl with KCl 20 mEq 125 mL/hr at 04/13/19 0242     PRN Meds: sodium chloride flush, potassium chloride **OR** potassium alternative oral replacement **OR** 3.9    107   CO2 23 21   GLUCOSE 142* 113*   BUN 11 8   CREATININE 1.01 0.86   MG 2.2 2.3   CALCIUM 9.0 8.5*   PHOS  --  2.9     Recent Labs     04/11/19 2050 04/13/19  0603   PROT 7.4 6.4   LABALBU 4.3 3.5   AST 11 19   ALT 9 10   ALKPHOS 87 77   BILITOT 0.25* 0.26*   BILIDIR <0.08  --    AMYLASE  --  53   LIPASE 38 22       Lab Results   Component Value Date/Time    SPECIAL NOT REPORTED 05/29/2018 08:56 AM     Lab Results   Component Value Date/Time    CULTURE  02/13/2017 08:59 AM     INAPPROPRIATE SPECIMEN FOR FUNGAL CULTURE TOMER GI UNIT NOTIFIED 2/13 0855    CULTURE  02/13/2017 08:59 AM     [facility] [address] [facility] [address] [place] [address] [phone]       No results found for: POCPH, PHART, PH, POCPCO2, GYD4TBR, PCO2, POCPO2, PO2ART, PO2, POCHCO3, MMG5HCY, HCO3, NBEA, PBEA, BEART, BE, THGBART, THB, GNR4KTV, KLQG4XUF, E7WYJOJY, O2SAT, FIO2    Radiology / Brittanie Shen:  See above      Assessment:        Primary Problem  Principal Problem:    Ileus ([address]  Active Problems:    Esophageal stricture    Hypokalemia    Tobacco use    Labile blood pressure    GERD (gastroesophageal reflux disease)    History of radiation therapy    Sleep apnea    Obesity (BMI 30-39. 9)  Resolved Problems:    * No resolved hospital problems.  *      Plan         Advance diet  Laxatives  Pain control  Antiemetics  Protonix  Surgical consultation / follow-up as scheduled  Correct electrolyte abnormalities  Blood Pressure - Monitor and control  Resume Norvasc  Reflux precautions  CPAP  Smoking cessation / education   DVT prophylaxis   GI follow-up - Dr Belkis Cortez this afternoon if stable  The patient was instructed to follow up with their PCP, Kristy Wood DO in one week     IP CONSULT TO GENERAL SURGERY  IP CONSULT TO HOSPITALIST  IP CONSULT TO  Vaughn Gaffney DO    4/13/2019    9:53 AM DISCHARGE

## 2020-07-17 ENCOUNTER — HOSPITAL ENCOUNTER (OUTPATIENT)
Dept: PREADMISSION TESTING | Age: 70
Discharge: HOME | End: 2020-07-21
Payer: MEDICARE

## 2020-07-17 PROCEDURE — U0004 COV-19 TEST NON-CDC HGH THRU: HCPCS

## 2020-07-18 LAB
SARS-COV-2, PCR: NOT DETECTED
SARS-COV-2, RAPID: NORMAL
SARS-COV-2: NORMAL
SOURCE: NORMAL

## 2020-07-20 ENCOUNTER — TELEPHONE (OUTPATIENT)
Dept: PRIMARY CARE CLINIC | Age: 70
End: 2020-07-20

## 2020-07-21 ENCOUNTER — HOSPITAL ENCOUNTER (OUTPATIENT)
Age: 70
Setting detail: OUTPATIENT SURGERY
Discharge: HOME | End: 2020-07-21
Attending: INTERNAL MEDICINE | Admitting: INTERNAL MEDICINE
Payer: MEDICARE

## 2020-07-21 ENCOUNTER — ANESTHESIA (OUTPATIENT)
Dept: ENDOSCOPY | Age: 70
End: 2020-07-21
Payer: MEDICARE

## 2020-07-21 ENCOUNTER — ANESTHESIA EVENT (OUTPATIENT)
Dept: ENDOSCOPY | Age: 70
End: 2020-07-21
Payer: MEDICARE

## 2020-07-21 VITALS
OXYGEN SATURATION: 100 % | WEIGHT: 217 LBS | BODY MASS INDEX: 31.07 KG/M2 | RESPIRATION RATE: 14 BRPM | HEART RATE: 59 BPM | TEMPERATURE: 96.3 F | HEIGHT: 70 IN | DIASTOLIC BLOOD PRESSURE: 72 MMHG | SYSTOLIC BLOOD PRESSURE: 137 MMHG

## 2020-07-21 VITALS
DIASTOLIC BLOOD PRESSURE: 52 MMHG | RESPIRATION RATE: 28 BRPM | OXYGEN SATURATION: 95 % | SYSTOLIC BLOOD PRESSURE: 87 MMHG

## 2020-07-21 PROCEDURE — 3609012700 HC EGD DILATION SAVORY: Performed by: INTERNAL MEDICINE

## 2020-07-21 PROCEDURE — 76937 US GUIDE VASCULAR ACCESS: CPT

## 2020-07-21 PROCEDURE — C1769 GUIDE WIRE: HCPCS | Performed by: INTERNAL MEDICINE

## 2020-07-21 PROCEDURE — 3700000001 HC ADD 15 MINUTES (ANESTHESIA): Performed by: INTERNAL MEDICINE

## 2020-07-21 PROCEDURE — 3609012400 HC EGD TRANSORAL BIOPSY SINGLE/MULTIPLE: Performed by: INTERNAL MEDICINE

## 2020-07-21 PROCEDURE — 6360000002 HC RX W HCPCS: Performed by: NURSE ANESTHETIST, CERTIFIED REGISTERED

## 2020-07-21 PROCEDURE — 7100000011 HC PHASE II RECOVERY - ADDTL 15 MIN: Performed by: INTERNAL MEDICINE

## 2020-07-21 PROCEDURE — 88305 TISSUE EXAM BY PATHOLOGIST: CPT

## 2020-07-21 PROCEDURE — 3700000000 HC ANESTHESIA ATTENDED CARE: Performed by: INTERNAL MEDICINE

## 2020-07-21 PROCEDURE — 2709999900 HC NON-CHARGEABLE SUPPLY: Performed by: INTERNAL MEDICINE

## 2020-07-21 PROCEDURE — 7100000010 HC PHASE II RECOVERY - FIRST 15 MIN: Performed by: INTERNAL MEDICINE

## 2020-07-21 PROCEDURE — 2580000003 HC RX 258: Performed by: INTERNAL MEDICINE

## 2020-07-21 PROCEDURE — 2500000003 HC RX 250 WO HCPCS: Performed by: NURSE ANESTHETIST, CERTIFIED REGISTERED

## 2020-07-21 RX ORDER — FENTANYL CITRATE 50 UG/ML
25 INJECTION, SOLUTION INTRAMUSCULAR; INTRAVENOUS EVERY 5 MIN PRN
Status: DISCONTINUED | OUTPATIENT
Start: 2020-07-21 | End: 2020-07-21 | Stop reason: HOSPADM

## 2020-07-21 RX ORDER — LIDOCAINE HYDROCHLORIDE 10 MG/ML
INJECTION, SOLUTION EPIDURAL; INFILTRATION; INTRACAUDAL; PERINEURAL PRN
Status: DISCONTINUED | OUTPATIENT
Start: 2020-07-21 | End: 2020-07-21 | Stop reason: SDUPTHER

## 2020-07-21 RX ORDER — PROPOFOL 10 MG/ML
INJECTION, EMULSION INTRAVENOUS PRN
Status: DISCONTINUED | OUTPATIENT
Start: 2020-07-21 | End: 2020-07-21 | Stop reason: SDUPTHER

## 2020-07-21 RX ORDER — MEPERIDINE HYDROCHLORIDE 50 MG/ML
12.5 INJECTION INTRAMUSCULAR; INTRAVENOUS; SUBCUTANEOUS EVERY 5 MIN PRN
Status: DISCONTINUED | OUTPATIENT
Start: 2020-07-21 | End: 2020-07-21 | Stop reason: HOSPADM

## 2020-07-21 RX ORDER — OXYCODONE HYDROCHLORIDE AND ACETAMINOPHEN 5; 325 MG/1; MG/1
2 TABLET ORAL PRN
Status: DISCONTINUED | OUTPATIENT
Start: 2020-07-21 | End: 2020-07-21 | Stop reason: HOSPADM

## 2020-07-21 RX ORDER — HYDRALAZINE HYDROCHLORIDE 20 MG/ML
5 INJECTION INTRAMUSCULAR; INTRAVENOUS EVERY 10 MIN PRN
Status: DISCONTINUED | OUTPATIENT
Start: 2020-07-21 | End: 2020-07-21 | Stop reason: HOSPADM

## 2020-07-21 RX ORDER — ONDANSETRON 2 MG/ML
4 INJECTION INTRAMUSCULAR; INTRAVENOUS
Status: DISCONTINUED | OUTPATIENT
Start: 2020-07-21 | End: 2020-07-21 | Stop reason: HOSPADM

## 2020-07-21 RX ORDER — LABETALOL HYDROCHLORIDE 5 MG/ML
5 INJECTION, SOLUTION INTRAVENOUS EVERY 10 MIN PRN
Status: DISCONTINUED | OUTPATIENT
Start: 2020-07-21 | End: 2020-07-21 | Stop reason: HOSPADM

## 2020-07-21 RX ORDER — DEXAMETHASONE SODIUM PHOSPHATE 10 MG/ML
4 INJECTION INTRAMUSCULAR; INTRAVENOUS
Status: DISCONTINUED | OUTPATIENT
Start: 2020-07-21 | End: 2020-07-21 | Stop reason: HOSPADM

## 2020-07-21 RX ORDER — DIPHENHYDRAMINE HYDROCHLORIDE 50 MG/ML
12.5 INJECTION INTRAMUSCULAR; INTRAVENOUS
Status: DISCONTINUED | OUTPATIENT
Start: 2020-07-21 | End: 2020-07-21 | Stop reason: HOSPADM

## 2020-07-21 RX ORDER — OXYCODONE HYDROCHLORIDE AND ACETAMINOPHEN 5; 325 MG/1; MG/1
1 TABLET ORAL PRN
Status: DISCONTINUED | OUTPATIENT
Start: 2020-07-21 | End: 2020-07-21 | Stop reason: HOSPADM

## 2020-07-21 RX ORDER — SODIUM CHLORIDE 9 MG/ML
INJECTION, SOLUTION INTRAVENOUS CONTINUOUS
Status: DISCONTINUED | OUTPATIENT
Start: 2020-07-21 | End: 2020-07-21 | Stop reason: HOSPADM

## 2020-07-21 RX ADMIN — SODIUM CHLORIDE: 9 INJECTION, SOLUTION INTRAVENOUS at 07:27

## 2020-07-21 RX ADMIN — LIDOCAINE HYDROCHLORIDE 50 MG: 10 INJECTION, SOLUTION EPIDURAL; INFILTRATION; INTRACAUDAL; PERINEURAL at 07:42

## 2020-07-21 RX ADMIN — PROPOFOL 150 MG: 10 INJECTION, EMULSION INTRAVENOUS at 07:43

## 2020-07-21 ASSESSMENT — PAIN SCALES - GENERAL: PAINLEVEL_OUTOF10: 0

## 2020-07-21 ASSESSMENT — PAIN - FUNCTIONAL ASSESSMENT: PAIN_FUNCTIONAL_ASSESSMENT: 0-10

## 2020-07-21 NOTE — H&P
History and Physical    Pt Name: Latricia Cowden  MRN: [id number]  YOB: 1950  Date of evaluation: 7/21/2020  Primary Care Physician: Mattie Jackson DO  Patient evaluated at the request of  Dr. Ritchie Cardenas for evaluation:   Dysphagia , esophageal strictures   SUBJECTIVE:   History of Chief Complaint:      Sherin Ashley is a 79 y.o. male ,      Sherin Ashley is a 71 y.o. male , Patient complains of dysphagia, GERD symptoms. Cecile Marley says that he has needed EGD with dilation several times in the past, feels that he needs this done at least 3-4 times per year typically.   Cecile Marley has been scheduled for EGD with dilation today. His last EGD was dilatation was in   NOV/2019    Denies GI bleeding and abdominal pain . dysphagia with swallowing pills , hx of smoking x 40 yrs . Past Medical History      has a past medical history of Arthritis, BPH (benign prostatic hyperplasia), Candida esophagitis ([address] Esophageal hiatus hernia, GERD (gastroesophageal reflux disease), History of dysphagia, History of esophageal stricture, History of ileus, History of radiation therapy, Poor dentition, Prostate cancer ([address] Sleep apnea, and Smoker. Past Surgical History   has a past surgical history that includes Endoscopy, colon, diagnostic; Prostate biopsy (May 2015); Upper gastrointestinal endoscopy (01/30/2017); Upper gastrointestinal endoscopy (02/13/2017); pr esophagogastroduodenoscopy transoral diagnostic (N/A, 2/13/2017); pr esophagogastroduodenoscopy transoral diagnostic (3/17/2017); pr egd balloon dilation esophagus <30 mm diam (N/A, 4/28/2017); pr egd balloon dilation esophagus <30 mm diam (N/A, 5/19/2017); Upper gastrointestinal endoscopy (N/A, 6/2/2017); Upper gastrointestinal endoscopy (N/A, 5/29/2018); Colonoscopy (5/29/2018); Colonoscopy (5/29/2018); Upper gastrointestinal endoscopy (N/A, 6/28/2018); Upper gastrointestinal endoscopy (N/A, 12/13/2018);  Upper gastrointestinal endoscopy (2018); Total hip arthroplasty (Right); Upper gastrointestinal endoscopy (N/A, 1/15/2019); Knee arthroscopy (Right); Upper gastrointestinal endoscopy (N/A, 2019); and Upper gastrointestinal endoscopy (N/A, 2019). Medications   Scheduled Meds:  Continuous Infusions:   sodium chloride       PRN Meds:. Allergies  has No Known Allergies. Family History    family history includes High Blood Pressure in his father; Other in his mother.     Family Status   Relation Name Status    Mother     Kylie Kebede Father      Sister  Alive         Social History  Social History     Socioeconomic History    Marital status:      Spouse name: Not on file    Number of children: Not on file    Years of education: Not on file    Highest education level: Not on file   Occupational History    Not on file   Social Needs    Financial resource strain: Not on file    Food insecurity     Worry: Not on file     Inability: Not on file   [employer] needs     Medical: Not on file     Non-medical: Not on file   Tobacco Use    Smoking status: Current Every Day Smoker     Packs/day: 0.75     Years: 40.00     Pack years: 30.00     Types: Cigarettes    Smokeless tobacco: Never Used   Substance and Sexual Activity    Alcohol use: No     Alcohol/week: 0.0 standard drinks    Drug use: No    Sexual activity: Not on file   Lifestyle    Physical activity     Days per week: Not on file     Minutes per session: Not on file    Stress: Not on file   Relationships    Social connections     Talks on phone: Not on file     Gets together: Not on file     Attends Denominational service: Not on file     Active member of club or organization: Not on file     Attends meetings of clubs or organizations: Not on file     Relationship status: Not on file    Intimate partner violence     Fear of current or ex partner: Not on file     Emotionally abused: Not on file     Physically abused: Not on file     Forced sexual activity: Not on file   Other Topics Concern    Not on file   Social History Narrative    Not on file              Service:Yes, [address] , weapons repair , [place]            Hobbies:  [employer]  [employer] games [phone], is a grandfather          OBJECTIVE:   VITALS:  height is 5' 10\" (1.778 m) and weight is 217 lb (98.4 kg). His oral temperature is 96.7 °F (35.9 °C). His blood pressure is 139/68 and his pulse is 63. His respiration is 15 and oxygen saturation is 99%. CONSTITUTIONAL: Alert and oriented times 3, no acute distress and cooperative to examination. friendly and pleasant     SKIN: rash No    HEENT: Head is normocephalic, atraumatic. EOMI, PERRLA    Oral air way :slightly narrow Yes    NECK: neck supple, no lymphadenopathy noted, trachea midline and straight       2+ carotid, no bruit    LUNGS: Chest expands equally bilaterally upon respiration, no accessory muscle used. Ausculation reveals no adventitious breath sounds. CARDIOVASCULAR: \"Heart sounds are normal.  Regular rate and rhythm without murmur,    ABDOMEN: Bowel sounds are present in all four quadrants      GENATALIA:Deferred. NEUROLOGIC: \"CN II-XII are grossly intact. EXTREMITIES: Pitting edema:  No,  Varicose veins: No     Dorsal pedal/posterior tibial pulses palpable: Yes         Strength:  Normal       Patient Active Problem List   Diagnosis    BPH (benign prostatic hyperplasia)    Esophageal stricture    S/P TURP    Chest pain    Ileus (HCC)    Hypokalemia    Tobacco use    Labile blood pressure    GERD (gastroesophageal reflux disease)    History of radiation therapy    Sleep apnea    Obesity (BMI 30-39. 9)    Hypocalcemia    Nausea    Slow transit constipation    ROLY (acute kidney injury) ([facility] [address]               IMPRESSIONS:   1. Dysphagia , esophageal strictures   2. does not have any pertinent problems on file.     Lonnie [facility]  Electronically signed 7/21/2020 at 7:11 AM       Scheduled for:  EGD ESOPHAGOGASTRODUODENOSCOPY WITH DILATION

## 2020-07-21 NOTE — ANESTHESIA POSTPROCEDURE EVALUATION
Department of Anesthesiology  Postprocedure Note    Patient: Mica Breen  MRN: [id number]  YOB: 1950  Date of evaluation: 7/21/2020  Time:  8:22 AM     Procedure Summary     Date:  07/21/20 Room / Location:  [address] [place] [address]    Anesthesia Start:  3935 Anesthesia Stop:  6313    Procedures:       EGD BIOPSY (N/A )      EGD DILATION SAVORY Diagnosis:  (DYSPHAGIA)    Surgeon:  Cheryl Caledrón MD Responsible Provider:  Tanvi Tomas MD    Anesthesia Type:  general, MAC ASA Status:  3          Anesthesia Type: general, MAC    Neal Phase I: Neal Score: 10    Neal Phase II: Neal Score: 7    Last vitals: Reviewed and per EMR flowsheets.        Anesthesia Post Evaluation    Patient location during evaluation: PACU  Patient participation: complete - patient participated  Level of consciousness: awake  Pain score: 0  Airway patency: patent  Nausea & Vomiting: no nausea and no vomiting  Complications: no  Cardiovascular status: blood pressure returned to baseline  Respiratory status: acceptable  Hydration status: euvolemic

## 2020-07-21 NOTE — OP NOTE
[facility]  EGD    Patient: Faraz Williamson            Date:   2020  : 1950       Med Rec#: [id number]       PROCEDURE:  EGD with Biopsy and Savary dilation (16 mm)    INDICATION:  Dysphagia    FINDINGS  Candida esophagitis. Diffuse narrowing and scaring throughout the entire esophagus.  Several small diverticula noted. Two discrete moderate benign-appearing strictures noted, one at the GEJ and one near the cricopharyngeus.  Dilated to 16 mm; mild resistance. Small hiatal hernia was noted on retroflexion. Moderate antral gastritis (biopsied) and bulbar duodenitis.      PLAN  Continue PPI. We will prescribe nystatin 5 ml PO (swish and swallow) QID for 14 days     Repeat EGD with dilation as needed  Quit smoking    MEDICATIONS:  MAC   ESTIMATED BLOOD LOSS:  Minimal  COMPLICATIONS: none  Prior to the procedure, the patient's history was reviewed and a directed physical examination was performed. Informed consent was obtained. After adequate sedation was achieved, the scope was inserted into the mouth and advanced to the second portion of the duodenum. As the scope was withdrawn, the anatomy and the appearance of the mucosa was noted.        Sherrill Clements M.D.

## 2020-07-21 NOTE — ANESTHESIA PRE PROCEDURE
Department of Anesthesiology  Preprocedure Note       Name:  Andreea Christensen   Age:  79 y.o.  :  1950                                          MRN:  [id number]         Date:  2020      Surgeon: Roselyn Rodriguez):  Kalyani Willard MD    Procedure: Procedure(s):  EGD ESOPHAGOGASTRODUODENOSCOPY WITH DILATION    Medications prior to admission:   Prior to Admission medications    Medication Sig Start Date End Date Taking? Authorizing Provider   Psyllium (METAMUCIL) 0.36 g CAPS Take by mouth as needed   Yes Historical Provider, MD   promethazine (PHENERGAN) 25 MG tablet Take 25 mg by mouth every 6 hours   Yes Historical Provider, MD   Docusate Calcium (STOOL SOFTENER PO) Take 100 mg by mouth as needed (constipation)    Yes Historical Provider, MD   omeprazole (PRILOSEC) 40 MG delayed release capsule Take 40 mg by mouth every morning (before breakfast)   Yes Historical Provider, MD   amLODIPine (NORVASC) 10 MG tablet Take 1 tablet by mouth daily 19  Yes Aries Contreras MD       Current medications:    Current Facility-Administered Medications   Medication Dose Route Frequency Provider Last Rate Last Dose    0.9 % sodium chloride infusion   Intravenous Continuous Kalyani Willard MD           Allergies:  No Known Allergies    Problem List:    Patient Active Problem List   Diagnosis Code    BPH (benign prostatic hyperplasia) N40.0    Esophageal stricture K22.2    S/P TURP Z90.79    Chest pain R07.9    Ileus (HCC) K56.7    Hypokalemia E87.6    Tobacco use Z72.0    Labile blood pressure R09.89    GERD (gastroesophageal reflux disease) K21.9    History of radiation therapy Z92.3    Sleep apnea G47.30    Obesity (BMI 30-39. 9) E66.9    Hypocalcemia E83.51    Nausea R11.0    Slow transit constipation K59.01    ROLY (acute kidney injury) ([facility] [address] N17.9       Past Medical History:        Diagnosis Date    Arthritis     BPH (benign prostatic hyperplasia)     Candida esophagitis ([facility] [address] 2017    Esophageal hiatus hernia     GERD (gastroesophageal reflux disease)     History of dysphagia     several EGDs with dilation    History of esophageal stricture     History of ileus 2019    History of radiation therapy     for prostate cancer    Poor dentition     Prostate cancer ([address] 2017    states scheduled for surgery in the next month    Sleep apnea     no machine, says uses nasal pillows     Smoker        Past Surgical History:        Procedure Laterality Date    COLONOSCOPY  2018    COLONOSCOPY POLYPECTOMY SNARE/COLD BIOPSY performed by Chelsea Willis MD at  [facility] [address] COLONOSCOPY  2018    COLONOSCOPY WITH BIOPSY performed by Chelsea Willis MD at [facility]    ENDOSCOPY, COLON, DIAGNOSTIC      KNEE ARTHROSCOPY Right     meniscus    MN EGD BALLOON DILATION ESOPHAGUS <30 MM DIAM N/A 2017    EGD ESOPHAGOGASTRODUODENOSCOPY DILATATION performed by Chelsea Willis MD at [address] [facility]  [address] <30 MM DIAM N/A 2017    EGD ESOPHAGOGASTRODUODENOSCOPY DILATATION performed by Chelsea Willis MD at [facility]    MN ESOPHAGOGASTRODUODENOSCOPY TRANSORAL DIAGNOSTIC N/A 2017    EGD ESOPHAGOGASTRODUODENOSCOPY  AND PEDIATRIC SCOPE performed by Jenna Morris MD at [address] [facility] ESOPHAGOGASTRODUODENOSCOPY TRANSORAL DIAGNOSTIC  3/17/2017    EGD ESOPHAGOGASTRODUODENOSCOPY performed by Chelsea Willis MD at [facility]  May 2015   [address] Right     UPPER GASTROINTESTINAL ENDOSCOPY  2017    egd with dilation    UPPER GASTROINTESTINAL ENDOSCOPY  2017    UPPER GASTROINTESTINAL ENDOSCOPY N/A 2017    EGD ESOPHAGOGASTRODUODENOSCOPY WITH DILATION SAVORY DILATORS 14-15-16MM performed by Chelsea Willis MD at [address] [facility] N/A 2018    EGD DIAGNOSTIC ONLY performed by Chelsea Willis MD at [address] [facility] ENDOSCOPY N/A 6/28/2018    EGD DILATION SAVORY performed by James Srinivasan MD at [address] [place] [address] N/A 12/13/2018    EGD BIOPSY performed by James Srinivasan MD at [address] [place] [address]  12/13/2018    EGD DILATION SAVORY performed by James Srinivasan MD at [address] [place] [address] N/A 1/15/2019    EGD DILATION SAVORY performed by Carlos Cutler MD at [facility]    UPPER GASTROINTESTINAL ENDOSCOPY N/A 9/17/2019    EGD DILATION SAVORY performed by James Srinivasan MD at [address] [place] [address] N/A 11/12/2019    EGD DILATION SAVORY performed by James Srinivasan MD at [facility]       Social History:    Social History     Tobacco Use    Smoking status: Current Every Day Smoker     Packs/day: 0.75     Years: 40.00     Pack years: 30.00     Types: Cigarettes    Smokeless tobacco: Never Used   Substance Use Topics    Alcohol use: No     Alcohol/week: 0.0 standard drinks                                Ready to quit: Not Answered  Counseling given: Not Answered      Vital Signs (Current):   Vitals:    07/21/20 0649   BP: 139/68   Pulse: 63   Resp: 15   Temp: 96.7 °F (35.9 °C)   TempSrc: Oral   SpO2: 99%   Weight: 217 lb (98.4 kg)   Height: 5' 10\" (1.778 m)                                              BP Readings from Last 3 Encounters:   07/21/20 139/68   11/12/19 (!) 152/85   11/12/19 112/82       NPO Status: Time of last liquid consumption: 2000                        Time of last solid consumption: 2100                        Date of last liquid consumption: 07/20/20                        Date of last solid food consumption: 07/20/20    BMI:   Wt Readings from Last 3 Encounters:   07/21/20 217 lb (98.4 kg)   11/12/19 240 lb (108.9 kg)   09/17/19 235 lb (106.6 kg)     Body mass index is 31.14 kg/m².     CBC:   Lab Results   Component Value Date    WBC 5.9 04/25/2019    RBC 3.78 04/25/2019    HGB 10.6 04/25/2019    HCT 34.5 04/25/2019    MCV 91.3 04/25/2019    RDW 15.6 04/25/2019     04/25/2019       CMP:   Lab Results   Component Value Date     04/25/2019    K 3.6 04/25/2019     04/25/2019    CO2 23 04/25/2019    BUN 11 04/25/2019    CREATININE 1.05 04/25/2019    GFRAA >60 04/25/2019    LABGLOM >60 04/25/2019    GLUCOSE 99 04/25/2019    PROT 7.9 04/22/2019    CALCIUM 8.0 04/25/2019    BILITOT 0.32 04/22/2019    ALKPHOS 105 04/22/2019    AST 34 04/22/2019    ALT 77 04/22/2019       POC Tests: No results for input(s): POCGLU, POCNA, POCK, POCCL, POCBUN, POCHEMO, POCHCT in the last 72 hours. Coags: No results found for: PROTIME, INR, APTT    HCG (If Applicable): No results found for: PREGTESTUR, PREGSERUM, HCG, HCGQUANT     ABGs: No results found for: PHART, PO2ART, LWL8MRX, RQE8IDB, BEART, B4OEFXPF     Type & Screen (If Applicable):  No results found for: LABABO, LABRH    Drug/Infectious Status (If Applicable):  No results found for: HIV, HEPCAB    COVID-19 Screening (If Applicable):   Lab Results   Component Value Date    COVID19 Not Detected 07/17/2020         Anesthesia Evaluation  Patient summary reviewed and Nursing notes reviewed  Airway: Mallampati: II  TM distance: >3 FB   Neck ROM: full  Mouth opening: > = 3 FB Dental: normal exam         Pulmonary:Negative Pulmonary ROS and normal exam  breath sounds clear to auscultation                             Cardiovascular:Negative CV ROS  Exercise tolerance: good (>4 METS),         ECG reviewed  Rhythm: regular  Rate: normal                    Neuro/Psych:   Negative Neuro/Psych ROS              GI/Hepatic/Renal: Neg GI/Hepatic/Renal ROS            Endo/Other: Negative Endo/Other ROS                    Abdominal:       Abdomen: soft. Vascular:                                      Anesthesia Plan      general and MAC     ASA 3       Induction: intravenous.     MIPS: Postoperative opioids intended and Prophylactic antiemetics administered. Anesthetic plan and risks discussed with patient. Use of blood products discussed with patient whom consented to blood products. Plan discussed with attending and CRNA.     Attending anesthesiologist reviewed and agrees with Margarett Mohs, MD   7/21/2020

## 2020-07-22 LAB — SURGICAL PATHOLOGY REPORT: NORMAL

## 2021-11-18 ENCOUNTER — ANESTHESIA (OUTPATIENT)
Dept: ENDOSCOPY | Age: 71
End: 2021-11-18
Payer: MEDICARE

## 2021-11-18 ENCOUNTER — ANESTHESIA EVENT (OUTPATIENT)
Dept: ENDOSCOPY | Age: 71
End: 2021-11-18
Payer: MEDICARE

## 2021-11-18 ENCOUNTER — HOSPITAL ENCOUNTER (OUTPATIENT)
Age: 71
Setting detail: OUTPATIENT SURGERY
Discharge: HOME | End: 2021-11-18
Attending: INTERNAL MEDICINE | Admitting: INTERNAL MEDICINE
Payer: MEDICARE

## 2021-11-18 VITALS
WEIGHT: 211 LBS | BODY MASS INDEX: 31.25 KG/M2 | SYSTOLIC BLOOD PRESSURE: 169 MMHG | OXYGEN SATURATION: 93 % | HEART RATE: 65 BPM | HEIGHT: 69 IN | RESPIRATION RATE: 17 BRPM | DIASTOLIC BLOOD PRESSURE: 67 MMHG | TEMPERATURE: 97 F

## 2021-11-18 VITALS
OXYGEN SATURATION: 95 % | DIASTOLIC BLOOD PRESSURE: 80 MMHG | SYSTOLIC BLOOD PRESSURE: 125 MMHG | RESPIRATION RATE: 25 BRPM

## 2021-11-18 PROCEDURE — 7100000010 HC PHASE II RECOVERY - FIRST 15 MIN: Performed by: INTERNAL MEDICINE

## 2021-11-18 PROCEDURE — 6360000002 HC RX W HCPCS: Performed by: NURSE ANESTHETIST, CERTIFIED REGISTERED

## 2021-11-18 PROCEDURE — 3700000000 HC ANESTHESIA ATTENDED CARE: Performed by: INTERNAL MEDICINE

## 2021-11-18 PROCEDURE — 3609017700 HC EGD DILATION GASTRIC/DUODENAL STRICTURE: Performed by: INTERNAL MEDICINE

## 2021-11-18 PROCEDURE — 7100000011 HC PHASE II RECOVERY - ADDTL 15 MIN: Performed by: INTERNAL MEDICINE

## 2021-11-18 PROCEDURE — 2580000003 HC RX 258: Performed by: INTERNAL MEDICINE

## 2021-11-18 PROCEDURE — 2500000003 HC RX 250 WO HCPCS: Performed by: NURSE ANESTHETIST, CERTIFIED REGISTERED

## 2021-11-18 PROCEDURE — C1769 GUIDE WIRE: HCPCS | Performed by: INTERNAL MEDICINE

## 2021-11-18 PROCEDURE — 3700000001 HC ADD 15 MINUTES (ANESTHESIA): Performed by: INTERNAL MEDICINE

## 2021-11-18 RX ORDER — SODIUM CHLORIDE 9 MG/ML
INJECTION, SOLUTION INTRAVENOUS CONTINUOUS
Status: DISCONTINUED | OUTPATIENT
Start: 2021-11-18 | End: 2021-11-18 | Stop reason: HOSPADM

## 2021-11-18 RX ORDER — PROPOFOL 10 MG/ML
INJECTION, EMULSION INTRAVENOUS PRN
Status: DISCONTINUED | OUTPATIENT
Start: 2021-11-18 | End: 2021-11-18 | Stop reason: SDUPTHER

## 2021-11-18 RX ORDER — LIDOCAINE HYDROCHLORIDE 10 MG/ML
INJECTION, SOLUTION INFILTRATION; PERINEURAL PRN
Status: DISCONTINUED | OUTPATIENT
Start: 2021-11-18 | End: 2021-11-18 | Stop reason: SDUPTHER

## 2021-11-18 RX ADMIN — PROPOFOL 20 MG: 10 INJECTION, EMULSION INTRAVENOUS at 09:13

## 2021-11-18 RX ADMIN — SODIUM CHLORIDE: 9 INJECTION, SOLUTION INTRAVENOUS at 08:34

## 2021-11-18 RX ADMIN — PROPOFOL 70 MG: 10 INJECTION, EMULSION INTRAVENOUS at 09:00

## 2021-11-18 RX ADMIN — PROPOFOL 30 MG: 10 INJECTION, EMULSION INTRAVENOUS at 09:07

## 2021-11-18 RX ADMIN — LIDOCAINE HYDROCHLORIDE 50 MG: 10 INJECTION, SOLUTION INFILTRATION; PERINEURAL at 09:01

## 2021-11-18 ASSESSMENT — PAIN - FUNCTIONAL ASSESSMENT: PAIN_FUNCTIONAL_ASSESSMENT: 0-10

## 2021-11-18 ASSESSMENT — LIFESTYLE VARIABLES: SMOKING_STATUS: 1

## 2021-11-18 ASSESSMENT — PAIN SCALES - GENERAL
PAINLEVEL_OUTOF10: 0

## 2021-11-18 NOTE — ANESTHESIA POSTPROCEDURE EVALUATION
POST- ANESTHESIA EVALUATION       Pt Name: Paul Chapman  MRN: [id number]  [place]: 1950  Date of evaluation: 11/18/2021  Time:  11:07 AM      BP (!) 169/67   Pulse 65   Temp 97 °F (36.1 °C) (Temporal)   Resp 17   Ht 5' 9\" (1.753 m)   Wt 211 lb (95.7 kg)   SpO2 93%   BMI 31.16 kg/m²      Consciousness Level  Awake  Cardiopulmonary Status  Stable  Pain Adequately Treated YES  Nausea / Vomiting  NO  Adequate Hydration  YES  Anesthesia Related Complications NONE      Electronically signed by Emanuel Fontenot MD on 11/18/2021 at 11:07 AM       Department of Anesthesiology  Postprocedure Note    Patient: Paul Chapman  MRN: [id number]  [place]: 1950  Date of evaluation: 11/18/2021  Time:  11:07 AM     Procedure Summary     Date: 11/18/21 Room / Location: [address]    Anesthesia Start: 8685 Anesthesia Stop: 0930    Procedure: EGD ESOPHAGOGASTRODUODENOSCOPY DILATATION (N/A ) Diagnosis: (ESOPHAGEAL STENOSIS)    Surgeons: Yohannes Chavez MD Responsible Provider: Emanuel Fontenot MD    Anesthesia Type: MAC ASA Status: 2          Anesthesia Type: MAC    Neal Phase I: Neal Score: 10    Neal Phase II: Neal Score: 10    Last vitals: Reviewed and per EMR flowsheets.        Anesthesia Post Evaluation

## 2021-11-18 NOTE — OP NOTE
[facility]. Endoscopy  EGD    Patient: Abraham Canela            Date:   2021  : 1950       Med Rec#: [id number]       PROCEDURE:  EGD with savory dilatation, 16 mm, wire-guided. INDICATION:  Dysphagia    FINDINGS  Moderate/severe candida esophagitis. Diffuse narrowing and scaring throughout the entire esophagus.  Several small diverticula noted. Two discrete moderate benign-appearing strictures noted, one at the GEJ and one near the cricopharyngeus.    Dilated to 16 mm; mild resistance. Small hiatal hernia was noted on retroflexion. Mil/moderate antral gastritis and bulbar duodenitis.      PLAN  Continue PPI. We will prescribe nystatin 5 ml PO (swish and swallow) QID for 14 days     Repeat EGD with dilation as needed  Tobacco cessation    MEDICATIONS:  MAC    ESTIMATED BLOOD LOSS:  minimal  Specimen(s) Removed: none  COMPLICATIONS: none  Prior to the procedure, the patient's history was reviewed and a directed physical examination was performed. Informed consent was obtained. After adequate sedation was achieved, the scope was inserted into the mouth and advanced to the second portion of the duodenum. As the scope was withdrawn, the anatomy and the appearance of the mucosa was noted.        Quoc Bernard M.D.

## 2021-11-18 NOTE — H&P
History and Physical    Pt Name: Alison Mohr  MRN: [id number]  YOB: 1950  Date of evaluation: 11/18/2021    SUBJECTIVE:   History of Chief Complaint:    Patient presents preprocedure for EGD, dilation. He has had this done in the past as well, most recently he believes approximately on year ago. He says that he has been experiencing difficulty swallowing solids again recently. He has underlying GERD as well. He has been scheduled for procedure today. Past Medical History    has a past medical history of Arthritis, BPH (benign prostatic hyperplasia), Candida esophagitis ([facility] [address] Esophageal hiatus hernia, GERD (gastroesophageal reflux disease), History of dysphagia, History of esophageal stricture, History of ileus, History of radiation therapy, Poor dentition, Prostate cancer ([facility] [address] Sleep apnea, and Smoker. Past Surgical History   has a past surgical history that includes Endoscopy, colon, diagnostic; Prostate biopsy (05/2015); Upper gastrointestinal endoscopy (01/30/2017); Upper gastrointestinal endoscopy (02/13/2017); pr esophagogastroduodenoscopy transoral diagnostic (N/A, 02/13/2017); pr esophagogastroduodenoscopy transoral diagnostic (03/17/2017); pr egd balloon dilation esophagus <30 mm diam (N/A, 04/28/2017); pr egd balloon dilation esophagus <30 mm diam (N/A, 05/19/2017); Upper gastrointestinal endoscopy (N/A, 06/02/2017); Upper gastrointestinal endoscopy (N/A, 05/29/2018); Colonoscopy (05/29/2018); Colonoscopy (05/29/2018); Upper gastrointestinal endoscopy (N/A, 06/28/2018); Upper gastrointestinal endoscopy (N/A, 12/13/2018); Upper gastrointestinal endoscopy (12/13/2018); Total hip arthroplasty (Bilateral); Upper gastrointestinal endoscopy (N/A, 01/15/2019); Knee arthroscopy (Right); Upper gastrointestinal endoscopy (N/A, 09/17/2019); Upper gastrointestinal endoscopy (N/A, 11/12/2019);  Upper gastrointestinal endoscopy (N/A, 07/21/2020); and Upper gastrointestinal endoscopy (07/21/2020). Medications  Prior to Admission medications    Medication Sig Start Date End Date Taking? Authorizing Provider   omeprazole (PRILOSEC) 40 MG delayed release capsule Take 40 mg by mouth every morning (before breakfast)   Yes Historical Provider, MD   Psyllium (METAMUCIL) 0.36 g CAPS Take by mouth as needed    Historical Provider, MD   promethazine (PHENERGAN) 25 MG tablet Take 25 mg by mouth every 6 hours    Historical Provider, MD   Docusate Calcium (STOOL SOFTENER PO) Take 100 mg by mouth as needed (constipation)     Historical Provider, MD     Allergies  has No Known Allergies. Family History  family history includes High Blood Pressure in his father; Other in his mother. Social History   reports that he has been smoking cigarettes. He has a 30.00 pack-year smoking history. He has never used smokeless tobacco.   reports no history of alcohol use. reports no history of drug use. Marital Status   Occupation retired    Review of Systems:  CONSTITUTIONAL:   negative for fevers, chills, fatigue and malaise   EYES:   negative for double vision, blurred vision and photophobia    HEENT:   negative for tinnitus, epistaxis and sore throat     RESPIRATORY:   negative for cough, shortness of breath, wheezing     CARDIOVASCULAR:   negative for chest pain, palpitations, syncope, edema     GASTROINTESTINAL:   negative for nausea, vomiting  Positive for dysphagia (solids)   GENITOURINARY:   negative for incontinence     MUSCULOSKELETAL:   negative for neck or back pain     NEUROLOGICAL:   Negative for weakness and tingling  negative for headaches and dizziness     PSYCHIATRIC:   negative for anxiety         OBJECTIVE:   VITALS:  height is 5' 9\" (1.753 m) and weight is 211 lb (95.7 kg). His infrared temperature is 97.7 °F (36.5 °C). His blood pressure is 154/77 (abnormal) and his pulse is 65. His respiration is 18 and oxygen saturation is 98%. CONSTITUTIONAL:alert & cooperative, no acute distress. Very pleasant. SKIN:  Warm and dry, no rashes on exposed areas of skin. HEAD:  Normocephalic, atraumatic. EYES: EOMs intact. EARS:  Hearing grossly WNL. NOSE:  Nares patent. No rhinorrhea. MOUTH/THROAT:  benign  NECK:supple, no lymphadenopathy  LUNGS: Clear to auscultation bilaterally, no wheezes. CARDIOVASCULAR: Heart sounds are normal.  Regular rate and rhythm without murmur. ABDOMEN: soft, non tender, non distended. EXTREMITIES: no edema bilateral lower extremities. IMPRESSIONS:   1. Dysphagia, esophageal stenosis  2.  has a past medical history of Arthritis, BPH (benign prostatic hyperplasia), Candida esophagitis ([facility] [address] (03/2017), Esophageal hiatus hernia, GERD (gastroesophageal reflux disease), History of dysphagia, History of esophageal stricture, History of ileus (04/2019), History of radiation therapy, Poor dentition, Prostate cancer ([facility] [address] (04/2017), Sleep apnea, and Smoker.    PLANS:   1. EGD, dilation    PIEDAD Benjamin PA-C  Electronically signed 11/18/2021 at 8:41 AM

## 2021-11-18 NOTE — ANESTHESIA PRE PROCEDURE
Department of Anesthesiology  Preprocedure Note       Name:  Paul Chapman   Age:  70 y.o.  :  1950                                          MRN:  [id number]         Date:  2021      Surgeon: Alhaji Hebert):  Yohannes Chavez MD    Procedure: Procedure(s):  EGD ESOPHAGOGASTRODUODENOSCOPY DILATATION    Medications prior to admission:   Prior to Admission medications    Medication Sig Start Date End Date Taking? Authorizing Provider   Psyllium (METAMUCIL) 0.36 g CAPS Take by mouth as needed    Historical Provider, MD   promethazine (PHENERGAN) 25 MG tablet Take 25 mg by mouth every 6 hours    Historical Provider, MD   Docusate Calcium (STOOL SOFTENER PO) Take 100 mg by mouth as needed (constipation)     Historical Provider, MD   omeprazole (PRILOSEC) 40 MG delayed release capsule Take 40 mg by mouth every morning (before breakfast)    Historical Provider, MD   amLODIPine (NORVASC) 10 MG tablet Take 1 tablet by mouth daily 19   Aurea Davis MD       Current medications:    No current facility-administered medications for this encounter. Allergies:  No Known Allergies    Problem List:    Patient Active Problem List   Diagnosis Code    BPH (benign prostatic hyperplasia) N40.0    Esophageal stricture K22.2    S/P TURP Z90.79    Chest pain R07.9    Ileus (HCC) K56.7    Hypokalemia E87.6    Tobacco use Z72.0    Labile blood pressure R09.89    GERD (gastroesophageal reflux disease) K21.9    History of radiation therapy Z92.3    Sleep apnea G47.30    Obesity (BMI 30-39. 9) E66.9    Hypocalcemia E83.51    Nausea R11.0    Slow transit constipation K59.01    ROLY (acute kidney injury) ([facility] [address] N17.9       Past Medical History:        Diagnosis Date    Arthritis     BPH (benign prostatic hyperplasia)     Candida esophagitis ([facility] [address] 2017    Esophageal hiatus hernia     GERD (gastroesophageal reflux disease)     History of dysphagia     several EGDs with dilation    History of esophageal stricture     History of ileus 2019    History of radiation therapy     for prostate cancer    Poor dentition     Prostate cancer ([facility] [address] 2017    states scheduled for surgery in the next month    Sleep apnea     no machine, says uses nasal pillows     Smoker        Past Surgical History:        Procedure Laterality Date    COLONOSCOPY  2018    COLONOSCOPY POLYPECTOMY SNARE/COLD BIOPSY performed by Kem Pruitt MD at [facility]    COLONOSCOPY  2018    COLONOSCOPY WITH BIOPSY performed by Kem Pruitt MD at [facility]    ENDOSCOPY, COLON, DIAGNOSTIC      KNEE ARTHROSCOPY Right     meniscus    [place] EGD BALLOON DILATION ESOPHAGUS <30 MM DIAM N/A 2017    EGD ESOPHAGOGASTRODUODENOSCOPY DILATATION performed by Kem Pruitt MD at [facility]    [place]  [address] <30 MM DIAM N/A 2017    EGD ESOPHAGOGASTRODUODENOSCOPY DILATATION performed by Kem Pruitt MD at [facility]    [place] ESOPHAGOGASTRODUODENOSCOPY TRANSORAL DIAGNOSTIC N/A 2017    EGD ESOPHAGOGASTRODUODENOSCOPY  AND PEDIATRIC SCOPE performed by Mary Jo Herrera MD at [address] [place] [address] ESOPHAGOGASTRODUODENOSCOPY TRANSORAL DIAGNOSTIC  3/17/2017    EGD ESOPHAGOGASTRODUODENOSCOPY performed by Kem Pruitt MD at [facility]  May 2015    TOTAL HIP ARTHROPLASTY Right     UPPER GASTROINTESTINAL ENDOSCOPY  2017    egd with dilation    UPPER GASTROINTESTINAL ENDOSCOPY  2017    UPPER GASTROINTESTINAL ENDOSCOPY N/A 2017    EGD ESOPHAGOGASTRODUODENOSCOPY WITH DILATION SAVORY DILATORS 14-15-16MM performed by Kem Pruitt MD at [address] [place] [address] N/A 2018    EGD DIAGNOSTIC ONLY performed by Kem Pruitt MD at [address] [place] [address] N/A 2018    EGD DILATION SAVORY performed by Kem Pruitt MD at [address] [place] [address] 12/13/2018    EGD BIOPSY performed by Leydi Wilson MD at [address] [place] [address]  12/13/2018    EGD DILATION SAVORY performed by Leydi Wilson MD at [address] [place] [address] N/A 1/15/2019    EGD DILATION SAVORY performed by Darrin Terry MD at [facility]    UPPER GASTROINTESTINAL ENDOSCOPY N/A 9/17/2019    EGD DILATION SAVORY performed by Leydi Wilson MD at [address] [place] [address] N/A 11/12/2019    EGD DILATION SAVORY performed by Leydi Wilson MD at [address] [place] [address] N/A 7/21/2020    EGD BIOPSY performed by Leydi Wilson MD at [address] [place] [address]  7/21/2020    EGD DILATION SAVORY performed by Leydi Wilson MD at [facility]       Social History:    Social History     Tobacco Use    Smoking status: Current Every Day Smoker     Packs/day: 0.75     Years: 40.00     Pack years: 30.00     Types: Cigarettes    Smokeless tobacco: Never Used   Substance Use Topics    Alcohol use: No     Alcohol/week: 0.0 standard drinks                                Ready to quit: Not Answered  Counseling given: Not Answered      Vital Signs (Current): There were no vitals filed for this visit.                                            BP Readings from Last 3 Encounters:   07/21/20 137/72   07/21/20 (!) 87/52   11/12/19 (!) 152/85       NPO Status:                                                                                 BMI:   Wt Readings from Last 3 Encounters:   07/21/20 217 lb (98.4 kg)   11/12/19 240 lb (108.9 kg)   09/17/19 235 lb (106.6 kg)     There is no height or weight on file to calculate BMI.    CBC:   Lab Results   Component Value Date    WBC 5.9 04/25/2019    RBC 3.78 04/25/2019    HGB 10.6 04/25/2019    HCT 34.5 04/25/2019    MCV 91.3 04/25/2019    RDW 15.6 04/25/2019     04/25/2019       CMP:   Lab Results   Component Value Date     04/25/2019    K 3.6 04/25/2019     04/25/2019    CO2 23 04/25/2019    BUN 11 04/25/2019    CREATININE 1.05 04/25/2019    GFRAA >60 04/25/2019    LABGLOM >60 04/25/2019    GLUCOSE 99 04/25/2019    PROT 7.9 04/22/2019    CALCIUM 8.0 04/25/2019    BILITOT 0.32 04/22/2019    ALKPHOS 105 04/22/2019    AST 34 04/22/2019    ALT 77 04/22/2019       POC Tests: No results for input(s): POCGLU, POCNA, POCK, POCCL, POCBUN, POCHEMO, POCHCT in the last 72 hours. Coags: No results found for: PROTIME, INR, APTT    HCG (If Applicable): No results found for: PREGTESTUR, PREGSERUM, HCG, HCGQUANT     ABGs: No results found for: PHART, PO2ART, UWZ6PZC, LEV3TGI, BEART, H8HGHTDG     Type & Screen (If Applicable):  No results found for: LABABO, LABRH    Drug/Infectious Status (If Applicable):  No results found for: HIV, HEPCAB    COVID-19 Screening (If Applicable):   Lab Results   Component Value Date    COVID19 Not Detected 07/17/2020           Anesthesia Evaluation  Patient summary reviewed and Nursing notes reviewed no history of anesthetic complications:   Airway: Mallampati: I  TM distance: >3 FB   Neck ROM: full  Mouth opening: > = 3 FB Dental:      Comment: Missing most upper and lower teeth    Pulmonary:normal exam    (+) sleep apnea:  current smoker                           Cardiovascular:    (+) hypertension:,                   Neuro/Psych:               GI/Hepatic/Renal:   (+) GERD:,           Endo/Other: Negative Endo/Other ROS                    Abdominal:             Vascular: Other Findings:           Anesthesia Plan      MAC     ASA 2       Induction: intravenous. MIPS: Postoperative opioids intended and Prophylactic antiemetics administered. Anesthetic plan and risks discussed with patient. Plan discussed with CRNA.                 Joe Eden MD   11/18/2021

## 2022-11-29 ENCOUNTER — APPOINTMENT (OUTPATIENT)
Dept: GENERAL RADIOLOGY | Age: 72
End: 2022-11-29
Payer: MEDICARE

## 2022-11-29 ENCOUNTER — HOSPITAL ENCOUNTER (OUTPATIENT)
Age: 72
Setting detail: OBSERVATION
Discharge: HOME | End: 2022-11-30
Attending: EMERGENCY MEDICINE | Admitting: STUDENT IN AN ORGANIZED HEALTH CARE EDUCATION/TRAINING PROGRAM
Payer: MEDICARE

## 2022-11-29 DIAGNOSIS — I10 UNCONTROLLED HYPERTENSION: ICD-10-CM

## 2022-11-29 DIAGNOSIS — I20.0 UNSTABLE ANGINA PECTORIS (HCC): Primary | ICD-10-CM

## 2022-11-29 DIAGNOSIS — U07.1 COVID-19: ICD-10-CM

## 2022-11-29 PROBLEM — R09.02 HYPOXIA: Status: ACTIVE | Noted: 2022-11-29

## 2022-11-29 LAB
ALBUMIN SERPL-MCNC: 4.3 G/DL (ref 3.5–5.2)
ALBUMIN/GLOBULIN RATIO: 1.5 (ref 1–2.5)
ALP BLD-CCNC: 91 U/L (ref 40–129)
ALT SERPL-CCNC: 12 U/L (ref 5–41)
ANION GAP SERPL CALCULATED.3IONS-SCNC: 14 MMOL/L (ref 9–17)
AST SERPL-CCNC: 14 U/L
BILIRUB SERPL-MCNC: 0.2 MG/DL (ref 0.3–1.2)
BUN BLDV-MCNC: 12 MG/DL (ref 8–23)
C-REACTIVE PROTEIN: 3.4 MG/L (ref 0–5)
CALCIUM SERPL-MCNC: 9.4 MG/DL (ref 8.6–10.4)
CHLORIDE BLD-SCNC: 103 MMOL/L (ref 98–107)
CO2: 22 MMOL/L (ref 20–31)
CREAT SERPL-MCNC: 1 MG/DL (ref 0.7–1.2)
D-DIMER QUANTITATIVE: 0.6 MG/L FEU
FLU A ANTIGEN: NEGATIVE
FLU B ANTIGEN: NEGATIVE
GFR SERPL CREATININE-BSD FRML MDRD: >60 ML/MIN/1.73M2
GLUCOSE BLD-MCNC: 90 MG/DL (ref 70–99)
HCT VFR BLD CALC: 41 % (ref 40.7–50.3)
HEMOGLOBIN: 13.2 G/DL (ref 13–17)
LIPASE: 46 U/L (ref 13–60)
MAGNESIUM: 2 MG/DL (ref 1.6–2.6)
MCH RBC QN AUTO: 30.2 PG (ref 25.2–33.5)
MCHC RBC AUTO-ENTMCNC: 32.2 G/DL (ref 28.4–34.8)
MCV RBC AUTO: 93.8 FL (ref 82.6–102.9)
NRBC AUTOMATED: 0 PER 100 WBC
PDW BLD-RTO: 15.3 % (ref 11.8–14.4)
PLATELET # BLD: 176 K/UL (ref 138–453)
PMV BLD AUTO: 10.4 FL (ref 8.1–13.5)
POTASSIUM SERPL-SCNC: 4.2 MMOL/L (ref 3.7–5.3)
RBC # BLD: 4.37 M/UL (ref 4.21–5.77)
SARS-COV-2, RAPID: DETECTED
SEDIMENTATION RATE, ERYTHROCYTE: 26 MM/HR (ref 0–20)
SODIUM BLD-SCNC: 139 MMOL/L (ref 135–144)
SPECIMEN DESCRIPTION: ABNORMAL
TOTAL PROTEIN: 7.2 G/DL (ref 6.4–8.3)
TROPONIN, HIGH SENSITIVITY: 12 NG/L (ref 0–22)
TROPONIN, HIGH SENSITIVITY: 13 NG/L (ref 0–22)
WBC # BLD: 6.2 K/UL (ref 3.5–11.3)

## 2022-11-29 PROCEDURE — 85379 FIBRIN DEGRADATION QUANT: CPT

## 2022-11-29 PROCEDURE — 84484 ASSAY OF TROPONIN QUANT: CPT

## 2022-11-29 PROCEDURE — 85652 RBC SED RATE AUTOMATED: CPT

## 2022-11-29 PROCEDURE — 85027 COMPLETE CBC AUTOMATED: CPT

## 2022-11-29 PROCEDURE — 87804 INFLUENZA ASSAY W/OPTIC: CPT

## 2022-11-29 PROCEDURE — 6370000000 HC RX 637 (ALT 250 FOR IP): Performed by: STUDENT IN AN ORGANIZED HEALTH CARE EDUCATION/TRAINING PROGRAM

## 2022-11-29 PROCEDURE — 93005 ELECTROCARDIOGRAM TRACING: CPT | Performed by: STUDENT IN AN ORGANIZED HEALTH CARE EDUCATION/TRAINING PROGRAM

## 2022-11-29 PROCEDURE — 99219 PR INITIAL OBSERVATION CARE/DAY 50 MINUTES: CPT | Performed by: NURSE PRACTITIONER

## 2022-11-29 PROCEDURE — 6360000002 HC RX W HCPCS: Performed by: STUDENT IN AN ORGANIZED HEALTH CARE EDUCATION/TRAINING PROGRAM

## 2022-11-29 PROCEDURE — 96376 TX/PRO/DX INJ SAME DRUG ADON: CPT

## 2022-11-29 PROCEDURE — 87635 SARS-COV-2 COVID-19 AMP PRB: CPT

## 2022-11-29 PROCEDURE — 83690 ASSAY OF LIPASE: CPT

## 2022-11-29 PROCEDURE — 86140 C-REACTIVE PROTEIN: CPT

## 2022-11-29 PROCEDURE — 71045 X-RAY EXAM CHEST 1 VIEW: CPT

## 2022-11-29 PROCEDURE — G0378 HOSPITAL OBSERVATION PER HR: HCPCS

## 2022-11-29 PROCEDURE — 99285 EMERGENCY DEPT VISIT HI MDM: CPT

## 2022-11-29 PROCEDURE — 96375 TX/PRO/DX INJ NEW DRUG ADDON: CPT

## 2022-11-29 PROCEDURE — 83735 ASSAY OF MAGNESIUM: CPT

## 2022-11-29 PROCEDURE — 80053 COMPREHEN METABOLIC PANEL: CPT

## 2022-11-29 PROCEDURE — 96374 THER/PROPH/DIAG INJ IV PUSH: CPT

## 2022-11-29 RX ORDER — SODIUM CHLORIDE 9 MG/ML
INJECTION, SOLUTION INTRAVENOUS PRN
Status: DISCONTINUED | OUTPATIENT
Start: 2022-11-29 | End: 2022-11-30 | Stop reason: HOSPADM

## 2022-11-29 RX ORDER — MAGNESIUM SULFATE 1 G/100ML
1000 INJECTION INTRAVENOUS PRN
Status: DISCONTINUED | OUTPATIENT
Start: 2022-11-29 | End: 2022-11-30 | Stop reason: HOSPADM

## 2022-11-29 RX ORDER — NITROGLYCERIN 0.4 MG/1
0.4 TABLET SUBLINGUAL EVERY 5 MIN PRN
Status: DISCONTINUED | OUTPATIENT
Start: 2022-11-29 | End: 2022-11-29

## 2022-11-29 RX ORDER — ONDANSETRON 4 MG/1
4 TABLET, ORALLY DISINTEGRATING ORAL EVERY 8 HOURS PRN
Status: DISCONTINUED | OUTPATIENT
Start: 2022-11-29 | End: 2022-11-30 | Stop reason: HOSPADM

## 2022-11-29 RX ORDER — SODIUM CHLORIDE 0.9 % (FLUSH) 0.9 %
5-40 SYRINGE (ML) INJECTION EVERY 12 HOURS SCHEDULED
Status: DISCONTINUED | OUTPATIENT
Start: 2022-11-29 | End: 2022-11-30 | Stop reason: HOSPADM

## 2022-11-29 RX ORDER — ACETAMINOPHEN 325 MG/1
650 TABLET ORAL EVERY 6 HOURS PRN
Status: DISCONTINUED | OUTPATIENT
Start: 2022-11-29 | End: 2022-11-30 | Stop reason: HOSPADM

## 2022-11-29 RX ORDER — DEXAMETHASONE SODIUM PHOSPHATE 10 MG/ML
10 INJECTION INTRAMUSCULAR; INTRAVENOUS ONCE
Status: COMPLETED | OUTPATIENT
Start: 2022-11-29 | End: 2022-11-29

## 2022-11-29 RX ORDER — POTASSIUM CHLORIDE 7.45 MG/ML
10 INJECTION INTRAVENOUS PRN
Status: DISCONTINUED | OUTPATIENT
Start: 2022-11-29 | End: 2022-11-30 | Stop reason: HOSPADM

## 2022-11-29 RX ORDER — ACETAMINOPHEN 500 MG
1000 TABLET ORAL ONCE
Status: COMPLETED | OUTPATIENT
Start: 2022-11-29 | End: 2022-11-29

## 2022-11-29 RX ORDER — ENOXAPARIN SODIUM 100 MG/ML
40 INJECTION SUBCUTANEOUS DAILY
Status: DISCONTINUED | OUTPATIENT
Start: 2022-11-29 | End: 2022-11-30 | Stop reason: HOSPADM

## 2022-11-29 RX ORDER — MORPHINE SULFATE 4 MG/ML
4 INJECTION, SOLUTION INTRAMUSCULAR; INTRAVENOUS ONCE
Status: COMPLETED | OUTPATIENT
Start: 2022-11-29 | End: 2022-11-29

## 2022-11-29 RX ORDER — SODIUM CHLORIDE 0.9 % (FLUSH) 0.9 %
10 SYRINGE (ML) INJECTION PRN
Status: DISCONTINUED | OUTPATIENT
Start: 2022-11-29 | End: 2022-11-30 | Stop reason: HOSPADM

## 2022-11-29 RX ORDER — POTASSIUM CHLORIDE 20 MEQ/1
40 TABLET, EXTENDED RELEASE ORAL PRN
Status: DISCONTINUED | OUTPATIENT
Start: 2022-11-29 | End: 2022-11-30 | Stop reason: HOSPADM

## 2022-11-29 RX ORDER — DEXAMETHASONE 4 MG/1
6 TABLET ORAL DAILY
Status: DISCONTINUED | OUTPATIENT
Start: 2022-11-30 | End: 2022-11-30 | Stop reason: HOSPADM

## 2022-11-29 RX ORDER — ONDANSETRON 2 MG/ML
4 INJECTION INTRAMUSCULAR; INTRAVENOUS EVERY 6 HOURS PRN
Status: DISCONTINUED | OUTPATIENT
Start: 2022-11-29 | End: 2022-11-30 | Stop reason: HOSPADM

## 2022-11-29 RX ORDER — ACETAMINOPHEN 650 MG/1
650 SUPPOSITORY RECTAL EVERY 6 HOURS PRN
Status: DISCONTINUED | OUTPATIENT
Start: 2022-11-29 | End: 2022-11-30 | Stop reason: HOSPADM

## 2022-11-29 RX ORDER — POLYETHYLENE GLYCOL 3350 17 G/17G
17 POWDER, FOR SOLUTION ORAL DAILY PRN
Status: DISCONTINUED | OUTPATIENT
Start: 2022-11-29 | End: 2022-11-30 | Stop reason: HOSPADM

## 2022-11-29 RX ADMIN — ACETAMINOPHEN 1000 MG: 500 TABLET ORAL at 17:21

## 2022-11-29 RX ADMIN — DEXAMETHASONE SODIUM PHOSPHATE 10 MG: 10 INJECTION INTRAMUSCULAR; INTRAVENOUS at 18:40

## 2022-11-29 RX ADMIN — NITROGLYCERIN 0.4 MG: 0.4 TABLET SUBLINGUAL at 17:21

## 2022-11-29 RX ADMIN — MORPHINE SULFATE 4 MG: 4 INJECTION INTRAVENOUS at 17:20

## 2022-11-29 RX ADMIN — MORPHINE SULFATE 4 MG: 4 INJECTION INTRAVENOUS at 18:38

## 2022-11-29 ASSESSMENT — ENCOUNTER SYMPTOMS
NAUSEA: 0
ABDOMINAL PAIN: 0
PHOTOPHOBIA: 0
DIARRHEA: 0
BACK PAIN: 0
EYE PAIN: 0
RHINORRHEA: 1
SINUS PAIN: 0
COUGH: 1
EYE REDNESS: 0
VOMITING: 0
CHEST TIGHTNESS: 1
SHORTNESS OF BREATH: 0
SORE THROAT: 0

## 2022-11-29 ASSESSMENT — PAIN DESCRIPTION - LOCATION
LOCATION: CHEST;GENERALIZED
LOCATION: CHEST
LOCATION: GENERALIZED

## 2022-11-29 ASSESSMENT — PAIN SCALES - GENERAL
PAINLEVEL_OUTOF10: 10
PAINLEVEL_OUTOF10: 8
PAINLEVEL_OUTOF10: 5
PAINLEVEL_OUTOF10: 9

## 2022-11-29 ASSESSMENT — PAIN DESCRIPTION - DESCRIPTORS
DESCRIPTORS: DISCOMFORT
DESCRIPTORS: ACHING

## 2022-11-29 NOTE — ACP (ADVANCE CARE PLANNING)
Advance Care Planning     Advance Care Planning Activator (Inpatient)  Conversation Note      Date of ACP Conversation: 11/29/2022     Conversation Conducted with: Patient with Decision Making Capacity    ACP Activator: Humera Hyde RN        Health Care Decision Maker:     Current Designated Health Care Decision Maker:     Click here to complete Healthcare Decision Makers including section of the Healthcare Decision Maker Relationship (ie \"Primary\")  Today we documented Decision Maker(s) consistent with Legal Next of Kin hierarchy. Care Preferences    Ventilation: \"If you were in your present state of health and suddenly became very ill and were unable to breathe on your own, what would your preference be about the use of a ventilator (breathing machine) if it were available to you? \"      Would the patient desire the use of ventilator (breathing machine)?: yes    \"If your health worsens and it becomes clear that your chance of recovery is unlikely, what would your preference be about the use of a ventilator (breathing machine) if it were available to you? \"     Would the patient desire the use of ventilator (breathing machine)?: Yes      Resuscitation  \"CPR works best to restart the heart when there is a sudden event, like a heart attack, in someone who is otherwise healthy. Unfortunately, CPR does not typically restart the heart for people who have serious health conditions or who are very sick. \"    \"In the event your heart stopped as a result of an underlying serious health condition, would you want attempts to be made to restart your heart (answer \"yes\" for attempt to resuscitate) or would you prefer a natural death (answer \"no\" for do not attempt to resuscitate)? \" yes       [] Yes   [] No   Educated Patient / Mammie Louder regarding differences between Advance Directives and portable DNR orders.     Length of ACP Conversation in minutes:      Conversation Outcomes:  [] ACP discussion completed  [] Existing advance directive reviewed with patient; no changes to patient's previously recorded wishes  [] New Advance Directive completed  [] Portable Do Not Rescitate prepared for Provider review and signature  [] POLST/POST/MOLST/MOST prepared for Provider review and signature      Follow-up plan:    [] Schedule follow-up conversation to continue planning  [] Referred individual to Provider for additional questions/concerns   [] Advised patient/agent/surrogate to review completed ACP document and update if needed with changes in condition, patient preferences or care setting    [] This note routed to one or more involved healthcare providers

## 2022-11-29 NOTE — ED NOTES
The following labs labeled with pt sticker and tubed to lab:     [x] Blue     [x] Lavender   [] on ice  [x] Green/yellow  [] Green/black [] on ice  [] Yellow  [] Red  [] Pink      [x] COVID-19 swab    [x] Rapid  [] PCR  [x] Flu Swab  [] Strep Swab  [] Peds Viral Panel     [] Urine Sample  [] Pelvic Cultures  [] Blood Cultures   [] Wound Cultures          Reid Del Castillo RN  11/29/22 [id number]

## 2022-11-29 NOTE — ED PROVIDER NOTES
[facility] ED  Emergency Department Encounter  EmergencyMedicineResident       Pt Name: Rigo Burton  MRN: [id number]  [place] 1950  Date of evaluation: 11/29/22  PCP: Shani Dudley DO    CHIEF COMPLAINT       Chief Complaint   Patient presents with    Chest Pain     C/o midsternal chest pain starting around 12 today       HISTORY OF PRESENT ILLNESS  (Location/Symptom, Timing/Onset, Context/Setting, Quality, Duration, Modifying Factors, Severity.)      Rigo Burton is a 67 y.o. male who presents today for evaluation of chest pain. Patient reports that chest pain started today left-sided nature. Does not radiate anywhere. Associated with a cough. In addition he is having nasal congestion. Patient has had a fever as well. Nothing attempted for relief this point. He does have significant past medical history of hypertension in the past but he electively stopped taking his hypertension medication. He reports 10 out of 10 pain at this time. Recent cardiac cath a few years prior showed mild coronary artery disease. PAST MEDICAL / SURGICAL /SOCIAL / FAMILY HISTORY      has a past medical history of Arthritis, BPH (benign prostatic hyperplasia), Candida esophagitis ([address] Esophageal hiatus hernia, GERD (gastroesophageal reflux disease), History of dysphagia, History of esophageal stricture, History of ileus, History of radiation therapy, Poor dentition, Prostate cancer ([address] Sleep apnea, and Smoker. has a past surgical history that includes Endoscopy, colon, diagnostic; Prostate biopsy (05/2015); Upper gastrointestinal endoscopy (01/30/2017); Upper gastrointestinal endoscopy (02/13/2017); pr esophagogastroduodenoscopy transoral diagnostic (N/A, 02/13/2017); pr esophagogastroduodenoscopy transoral diagnostic (03/17/2017); pr egd balloon dilation esophagus <30 mm diam (N/A, 04/28/2017); pr egd balloon dilation esophagus <30 mm diam (N/A, 05/19/2017);  Upper gastrointestinal endoscopy (N/A, 06/02/2017); Upper gastrointestinal endoscopy (N/A, 05/29/2018); Colonoscopy (05/29/2018); Colonoscopy (05/29/2018); Upper gastrointestinal endoscopy (N/A, 06/28/2018); Upper gastrointestinal endoscopy (N/A, 12/13/2018); Upper gastrointestinal endoscopy (12/13/2018); Total hip arthroplasty (Bilateral); Upper gastrointestinal endoscopy (N/A, 01/15/2019); Knee arthroscopy (Right); Upper gastrointestinal endoscopy (N/A, 09/17/2019); Upper gastrointestinal endoscopy (N/A, 11/12/2019); Upper gastrointestinal endoscopy (N/A, 07/21/2020); Upper gastrointestinal endoscopy (07/21/2020); and Upper gastrointestinal endoscopy (N/A, 11/18/2021). Social History     Socioeconomic History    Marital status:      Spouse name: Not on file    Number of children: Not on file    Years of education: Not on file    Highest education level: Not on file   Occupational History    Not on file   Tobacco Use    Smoking status: Every Day     Packs/day: 0.75     Years: 40.00     Pack years: 30.00     Types: Cigarettes    Smokeless tobacco: Never   Substance and Sexual Activity    Alcohol use: No     Alcohol/week: 0.0 standard drinks    Drug use: No    Sexual activity: Not on file   Other Topics Concern    Not on file   Social History Narrative    Not on file     Social Determinants of Health     Financial Resource Strain: Not on file   Food Insecurity: Not on file   Transportation Needs: Not on file   Physical Activity: Not on file   Stress: Not on file   Social Connections: Not on file   Intimate Partner Violence: Not on file   Housing Stability: Not on file       Family History   Problem Relation Age of Onset    Other Mother         breathing problems    High Blood Pressure Father        Allergies:  Patient has no known allergies. Home Medications:  Prior to Admission medications    Medication Sig Start Date End Date Taking?  Authorizing Provider   Psyllium (METAMUCIL) 0.36 g CAPS Take by mouth as needed Historical Provider, MD   promethazine (PHENERGAN) 25 MG tablet Take 25 mg by mouth every 6 hours    Historical Provider, MD   Docusate Calcium (STOOL SOFTENER PO) Take 100 mg by mouth as needed (constipation)     Historical Provider, MD   omeprazole (PRILOSEC) 40 MG delayed release capsule Take 40 mg by mouth every morning (before breakfast)    Historical Provider, MD       REVIEW OF SYSTEMS    (2-9 systems for level 4, 10 or more forlevel 5)      Review of Systems   Constitutional:  Negative for activity change, chills and fever. HENT:  Positive for congestion and rhinorrhea. Negative for sinus pain and sore throat. Eyes:  Negative for pain and visual disturbance. Respiratory:  Positive for cough. Negative for shortness of breath. Cardiovascular:  Positive for chest pain. Gastrointestinal:  Negative for abdominal pain, diarrhea, nausea and vomiting. Genitourinary:  Negative for difficulty urinating, dysuria and hematuria. Musculoskeletal:  Negative for back pain and myalgias. Skin:  Negative for rash and wound. Neurological:  Negative for dizziness, light-headedness and headaches. Psychiatric/Behavioral:  Negative for agitation and confusion. PHYSICAL EXAM   (up to 7 for level 4, 8 or more forlevel 5)      ED TRIAGE VITALS BP: (!) 175/76, Temp: (!) 101.6 °F (38.7 °C), Heart Rate: 82, Resp: 18, SpO2: 98 %    Vitals:    11/29/22 1616 11/29/22 1711   BP: (!) 175/76    Pulse: 82    Resp: 18    Temp: (!) 101.6 °F (38.7 °C)    TempSrc: Oral    SpO2: 98%    Weight:  210 lb (95.3 kg)   Height:  5' 7\" (1.702 m)         Physical Exam  Vitals and nursing note reviewed. Constitutional:       General: He is not in acute distress. Appearance: Normal appearance. He is not ill-appearing or toxic-appearing. HENT:      Head: Normocephalic and atraumatic. Nose: Congestion and rhinorrhea present.       Mouth/Throat:      Mouth: Mucous membranes are moist.   Eyes:      Extraocular Movements: Extraocular movements intact. Pupils: Pupils are equal, round, and reactive to light. Cardiovascular:      Rate and Rhythm: Normal rate and regular rhythm. Pulses: Normal pulses. Heart sounds: Normal heart sounds. No murmur heard. Pulmonary:      Effort: Pulmonary effort is normal. No respiratory distress. Breath sounds: Normal breath sounds. No stridor. No wheezing or rales. Abdominal:      General: Abdomen is flat. Palpations: Abdomen is soft. Tenderness: There is no abdominal tenderness. There is no guarding. Musculoskeletal:         General: Normal range of motion. Cervical back: Normal range of motion. Skin:     General: Skin is warm and dry. Capillary Refill: Capillary refill takes less than 2 seconds. Neurological:      General: No focal deficit present. Mental Status: He is alert and oriented to person, place, and time.    Psychiatric:         Mood and Affect: Mood normal.         Behavior: Behavior normal.         DIFFERENTIAL  DIAGNOSIS     PLAN (LABS / IMAGING / EKG):  Orders Placed This Encounter   Procedures    COVID-19, Rapid    RAPID INFLUENZA A/B ANTIGENS    XR CHEST PORTABLE    CBC    Comprehensive Metabolic Panel    Troponin    Lipase    Magnesium    C-Reactive Protein    D-Dimer, Quantitative    Sedimentation Rate    Cardiac Monitor    Pulse Oximetry    Inpatient consult to Hospitalist    Inpatient consult to Cardiology    EKG 12 Lead    Saline lock IV       MEDICATIONS ORDERED:  ED Medication Orders (From admission, onward)      Start Ordered     Status Ordering Provider    11/29/22 1845 11/29/22 1836  morphine injection 4 mg  ONCE         Ordered KASSANDRA DYER    11/29/22 1800 11/29/22 1753  dexamethasone (DECADRON) injection 10 mg  ONCE         Acknowledged KASSANDRA DYER    11/29/22 1715 11/29/22 1701  morphine injection 4 mg  ONCE         Last MAR action: Given - by Nubia Sena on 11/29/22 at [facility] KASSANDRA Roque    11/29/22 Hematocrit 41.0 40.7 - 50.3 %    MCV 93.8 82.6 - 102.9 fL    MCH 30.2 25.2 - 33.5 pg    MCHC 32.2 28.4 - 34.8 g/dL    RDW 15.3 (H) 11.8 - 14.4 %    Platelets 428 208 - 887 k/uL    MPV 10.4 8.1 - 13.5 fL    NRBC Automated 0.0 0.0 per 100 WBC   Comprehensive Metabolic Panel   Result Value Ref Range    Glucose 90 70 - 99 mg/dL    BUN 12 8 - 23 mg/dL    Creatinine 1.00 0.70 - 1.20 mg/dL    Est, Glom Filt Rate >60 >60 mL/min/1.73m2    Calcium 9.4 8.6 - 10.4 mg/dL    Sodium 139 135 - 144 mmol/L    Potassium 4.2 3.7 - 5.3 mmol/L    Chloride 103 98 - 107 mmol/L    CO2 22 20 - 31 mmol/L    Anion Gap 14 9 - 17 mmol/L    Alkaline Phosphatase 91 40 - 129 U/L    ALT 12 5 - 41 U/L    AST 14 <40 U/L    Total Bilirubin 0.2 (L) 0.3 - 1.2 mg/dL    Total Protein 7.2 6.4 - 8.3 g/dL    Albumin 4.3 3.5 - 5.2 g/dL    Albumin/Globulin Ratio 1.5 1.0 - 2.5   Troponin   Result Value Ref Range    Troponin, High Sensitivity 13 0 - 22 ng/L   Lipase   Result Value Ref Range    Lipase 46 13 - 60 U/L   Magnesium   Result Value Ref Range    Magnesium 2.0 1.6 - 2.6 mg/dL       RADIOLOGY:  XR CHEST PORTABLE    (Results Pending)     EMERGENCY DEPARTMENT COURSE:    ED Course as of 11/29/22 1839 Tue Nov 29, 2022 1753 SARS-CoV-2, Rapid(!): DETECTED [TJ]   1753 Troponin, High Sensitivity: 13 [TJ]      ED Course User Index  [TJ] Yemi Singleton MD      CONSULTS:  IP CONSULT TO HOSPITALIST  IP CONSULT TO CARDIOLOGY    CRITICAL CARE:  See attending physician note    FINAL IMPRESSION      1. Unstable angina pectoris ([address]    2. COVID-19    3. Uncontrolled hypertension          DISPOSITION / PLAN     DISPOSITION Decision To Admit 11/29/2022 06:01:38 PM      PATIENT REFERRED TO:  No follow-up provider specified.     DISCHARGE MEDICATIONS:  New Prescriptions    No medications on file     Modified Medications    No medications on file        Yemi Singleton MD  Emergency Medicine Resident    (Please note that portions of this note were completed with a

## 2022-11-29 NOTE — ED PROVIDER NOTES
[facility]  Emergency Department  Emergency Medicine Resident Sign-out     Care of Anthony Collins was assumed from Dr. Carmelo Handy and is being seen for Chest Pain (C/o midsternal chest pain starting around 12 today)  . The patient's initial evaluation and plan have been discussed with the prior provider who initially evaluated the patient. EMERGENCY DEPARTMENT COURSE / MEDICAL DECISION MAKING:       MEDICATIONS GIVEN:  Orders Placed This Encounter   Medications    nitroGLYCERIN (NITROSTAT) SL tablet 0.4 mg    acetaminophen (TYLENOL) tablet 1,000 mg    morphine injection 4 mg    dexamethasone (DECADRON) injection 10 mg    morphine injection 4 mg       LABS / RADIOLOGY:     Results for orders placed or performed during the hospital encounter of 11/29/22   COVID-19, Rapid    Specimen: Nasopharyngeal Swab   Result Value Ref Range    Specimen Description . NASOPHARYNGEAL SWAB     SARS-CoV-2, Rapid DETECTED (A) Not Detected   RAPID INFLUENZA A/B ANTIGENS    Specimen: Nasopharyngeal   Result Value Ref Range    Flu A Antigen NEGATIVE NEGATIVE    Flu B Antigen NEGATIVE NEGATIVE   CBC   Result Value Ref Range    WBC 6.2 3.5 - 11.3 k/uL    RBC 4.37 4.21 - 5.77 m/uL    Hemoglobin 13.2 13.0 - 17.0 g/dL    Hematocrit 41.0 40.7 - 50.3 %    MCV 93.8 82.6 - 102.9 fL    MCH 30.2 25.2 - 33.5 pg    MCHC 32.2 28.4 - 34.8 g/dL    RDW 15.3 (H) 11.8 - 14.4 %    Platelets 653 612 - 332 k/uL    MPV 10.4 8.1 - 13.5 fL    NRBC Automated 0.0 0.0 per 100 WBC   Comprehensive Metabolic Panel   Result Value Ref Range    Glucose 90 70 - 99 mg/dL    BUN 12 8 - 23 mg/dL    Creatinine 1.00 0.70 - 1.20 mg/dL    Est, Glom Filt Rate >60 >60 mL/min/1.73m2    Calcium 9.4 8.6 - 10.4 mg/dL    Sodium 139 135 - 144 mmol/L    Potassium 4.2 3.7 - 5.3 mmol/L    Chloride 103 98 - 107 mmol/L    CO2 22 20 - 31 mmol/L    Anion Gap 14 9 - 17 mmol/L    Alkaline Phosphatase 91 40 - 129 U/L    ALT 12 5 - 41 U/L    AST 14 <40 U/L    Total Bilirubin 0.2 (L) 0.3 - 1.2 mg/dL    Total Protein 7.2 6.4 - 8.3 g/dL    Albumin 4.3 3.5 - 5.2 g/dL    Albumin/Globulin Ratio 1.5 1.0 - 2.5   Troponin   Result Value Ref Range    Troponin, High Sensitivity 13 0 - 22 ng/L   Lipase   Result Value Ref Range    Lipase 46 13 - 60 U/L   Magnesium   Result Value Ref Range    Magnesium 2.0 1.6 - 2.6 mg/dL       No results found. RECENT VITALS:     Temp: (!) 101.6 °F (38.7 °C),  Heart Rate: 82, Resp: 18, BP: (!) 175/76, SpO2: 98 %    This patient is a 67 y.o. Male with chest pain, nasal congestion and cough. He presented today with a blood pressure of 348 systolically. He reports a history of hypertension but he electively took himself off of blood pressure medications years ago. Here on exam he had clear lung sounds. He was treated for chest pain with nitro which resolved his hypertension down to the 160s. His COVID test was positive. He did become hypoxic with oxygen saturations in the high 80s. Placed on 2 L nasal cannula. Provided 10 mg of Decadron. Case was discussed with [facility] who accepted the patient for admission. In the emergency department we are pending the D-dimer, if greater than 1 will order CT PE. Otherwise no outstanding orders. D-dimer less than 1. Using years criteria no indication for CT PE study at this time. Patient also using age-adjusted D-dimer does not require CT PE study. OUTSTANDING TASKS / RECOMMENDATIONS:    F/u d dimer  Bed placement      FINAL IMPRESSION:     1. Unstable angina pectoris ([address]    2. COVID-19    3. Uncontrolled hypertension        DISPOSITION:         DISPOSITION:  []  Home   []  Nursing Facility   []  Transfer -    [x]  [facility]   FOLLOW-UP: No follow-up provider specified.    DISCHARGE MEDICATIONS: New Prescriptions    No medications on file          Surya Marino DO  Emergency Medicine Resident  [facility] [place], [place]  Resident  11/29/22 2015

## 2022-11-29 NOTE — Clinical Note
Discharge Plan[de-identified] Other/[facility])   Telemetry/Cardiac Monitoring Required?: Yes   Bed request comments: COVID (+)

## 2022-11-30 VITALS
HEART RATE: 74 BPM | BODY MASS INDEX: 32.96 KG/M2 | SYSTOLIC BLOOD PRESSURE: 141 MMHG | RESPIRATION RATE: 21 BRPM | HEIGHT: 67 IN | DIASTOLIC BLOOD PRESSURE: 64 MMHG | WEIGHT: 210 LBS | OXYGEN SATURATION: 91 % | TEMPERATURE: 98.6 F

## 2022-11-30 LAB
ANION GAP SERPL CALCULATED.3IONS-SCNC: 9 MMOL/L (ref 9–17)
BUN BLDV-MCNC: 16 MG/DL (ref 8–23)
CALCIUM SERPL-MCNC: 8.9 MG/DL (ref 8.6–10.4)
CHLORIDE BLD-SCNC: 106 MMOL/L (ref 98–107)
CO2: 23 MMOL/L (ref 20–31)
CREAT SERPL-MCNC: 0.86 MG/DL (ref 0.7–1.2)
GFR SERPL CREATININE-BSD FRML MDRD: >60 ML/MIN/1.73M2
GLUCOSE BLD-MCNC: 123 MG/DL (ref 70–99)
INR BLD: 1.1
POTASSIUM SERPL-SCNC: 4.5 MMOL/L (ref 3.7–5.3)
PROTHROMBIN TIME: 11.5 SEC (ref 9.1–12.3)
SODIUM BLD-SCNC: 138 MMOL/L (ref 135–144)
TROPONIN, HIGH SENSITIVITY: 10 NG/L (ref 0–22)
TROPONIN, HIGH SENSITIVITY: 7 NG/L (ref 0–22)

## 2022-11-30 PROCEDURE — 80048 BASIC METABOLIC PNL TOTAL CA: CPT

## 2022-11-30 PROCEDURE — 6360000002 HC RX W HCPCS: Performed by: NURSE PRACTITIONER

## 2022-11-30 PROCEDURE — 96372 THER/PROPH/DIAG INJ SC/IM: CPT

## 2022-11-30 PROCEDURE — 99225 PR SBSQ OBSERVATION CARE/DAY 25 MINUTES: CPT | Performed by: STUDENT IN AN ORGANIZED HEALTH CARE EDUCATION/TRAINING PROGRAM

## 2022-11-30 PROCEDURE — 94761 N-INVAS EAR/PLS OXIMETRY MLT: CPT

## 2022-11-30 PROCEDURE — 84484 ASSAY OF TROPONIN QUANT: CPT

## 2022-11-30 PROCEDURE — 85610 PROTHROMBIN TIME: CPT

## 2022-11-30 PROCEDURE — 2580000003 HC RX 258: Performed by: NURSE PRACTITIONER

## 2022-11-30 PROCEDURE — G0378 HOSPITAL OBSERVATION PER HR: HCPCS

## 2022-11-30 RX ADMIN — SODIUM CHLORIDE, PRESERVATIVE FREE 10 ML: 5 INJECTION INTRAVENOUS at 02:16

## 2022-11-30 RX ADMIN — SODIUM CHLORIDE, PRESERVATIVE FREE 5 ML: 5 INJECTION INTRAVENOUS at 08:11

## 2022-11-30 RX ADMIN — ENOXAPARIN SODIUM 40 MG: 100 INJECTION SUBCUTANEOUS at 02:16

## 2022-11-30 RX ADMIN — DEXAMETHASONE 6 MG: 4 TABLET ORAL at 09:17

## 2022-11-30 NOTE — ED NOTES
Pt requesting update, RN updated pt w given information per attending and cardiology.       Miguel Estevez, RN  11/30/22 0074

## 2022-11-30 NOTE — ED NOTES
TRANSFER - OUT REPORT:    Verbal report given to zoNE 1-02 on Jasmin Tavarez  being transferred to  when bed available for routine progression of patient care       Report consisted of patient's Situation, Background, Assessment and   Recommendations(SBAR). -Pt on 2L NC   -Pt COVID+  -Pt ambulatory after set up assist   -Pt c/o of increased pain, has tylenol ordered by states \"that doesn't work\" requesting stronger pain medications  -Trop due @ 0230 am   -Echo ordered   -Pt states chest pain is relieved     Information from the following report(s) Nurse Handoff Report was reviewed with the receiving nurse. [place] Assessment: Presents to emergency department  because of falls (Syncope, seizure, or loss of consciousness): No, Age > 79: Yes, Altered Mental Status, Intoxication with alcohol or substance confusion (Disorientation, impaired judgment, poor safety awaremess, or inability to follow instructions): No, Impaired Mobility: Ambulates or transfers with assistive devices or assistance; Unable to ambulate or transer.: Yes, Nursing Judgement: Yes  Lines:   Peripheral IV 11/29/22 Antecubital (Active)   Site Assessment Clean, dry & intact 11/29/22 1648   Line Status Brisk blood return;Specimen collected;Normal saline locked; Flushed 11/29/22 1648   Line Care Cap changed; Connections checked and tightened 11/29/22 1648   Phlebitis Assessment No symptoms 11/29/22 1648   Infiltration Assessment 0 11/29/22 1648   Alcohol Cap Used No 11/29/22 1648   Dressing Status New dressing applied;Clean, dry & intact 11/29/22 1648   Dressing Type Transparent 11/29/22 1648   Dressing Intervention New 11/29/22 1648        Opportunity for questions and clarification was provided.       Patient transported with:  O2 @ 2Llpm           Pineda Ramos RN  11/30/22 0148       Pineda Ramos RN  11/30/22 [address] Maira Can RN  11/30/22 7713

## 2022-11-30 NOTE — ED NOTES
Pt presents to the ED with c/o of SOB, CP, and generalized body aches. Pt states he recently took a covid test when he went to [facility] at the end of September which was negative. Pt states that today he started having generalized body ache with SOB. Pt hypoxic on RA. Pt placed on 2L NC. O2 improvement. .   Pt alert and oriented x4, Call light in reach, white board updated. Family @ bedside.         Pati Robb RN  11/29/22 2009

## 2022-11-30 NOTE — ED PROVIDER NOTES
[facility]     Emergency Department     Faculty Attestation    I performed a history and physical examination of the patient and discussed management with the resident. I reviewed the residents note and agree with the documented findings including all diagnostic interpretations and plan of care. Any areas of disagreement are noted on the chart. I was personally present for the key portions of any procedures. I have documented in the chart those procedures where I was not present during the key portions. I have reviewed the emergency nurses triage note. I agree with the chief complaint, past medical history, past surgical history, allergies, medications, social and family history as documented unless otherwise noted below. Documentation of the HPI, Physical Exam and Medical Decision Making performed by scribzaheer is based on my personal performance of the HPI, PE and MDM. For Physician Assistant/ Nurse Practitioner cases/documentation I have personally evaluated this patient and have completed at least one if not all key elements of the E/M (history, physical exam, and MDM). Additional findings are as noted. Primary Care Physician: Jody Curry DO    History: This is a 67 y.o. male who presents to the Emergency Department with complaint of chest pain. Started earlier today. No significant cough some shortness of breath some nausea. No diaphoresis. No history of obstructive CAD but does have history of hypertension for which she does not take any medications currently. Physical:     height is 5' 7\" (1.702 m) and weight is 210 lb (95.3 kg). His oral temperature is 101.6 °F (38.7 °C) (abnormal). His blood pressure is 175/76 (abnormal) and his pulse is 82. His respiration is 18 and oxygen saturation is 98%.     67 y.o. male no acute distress, appears uncomfortable, cardiac exam regular rate and rhythm no murmurs rubs gallops, pulmonary clear bilaterally abdomen soft nontender nondistended, calves nontender nonswollen. Impression: Chest pain, hypertensive emergency? Fever    Plan: Viral swabs cardiac work-up, likely admission    No notes of EC Admission Criteria type on file.     EKG Interpretation  EKG Interpretation    Interpreted by emergency department physician    Rhythm: normal sinus   Rate: normal  Axis: normal  Ectopy: none  Conduction:  QRS 86, QTc 510  ST Segments: normal  T Waves: inversion in  lateral leads and inferior leads, unchanged  Q Waves: none    EKG  Impression: no acute changes, abnormal EKG, unchanged compared with EKG from 2019    Rosemarie Leger MD    Interpreted by me      Monika Barron MD, Robin Haines  Attending Emergency Physician        Rosemarie Leger MD  11/29/22 [id number]

## 2022-11-30 NOTE — PROGRESS NOTES
[facility]  Office: [address], DO, Roxanne Louise, DO, Ibrahimawilber Yeboah, DO, Ingris Jaramillo Blood, DO, Joni Garcia MD, Lexx Haley MD, Marely Perez MD, Belinda Mead MD,  Yang Sosa MD, Areli Leonard MD, Dionicio Gold DO, Damian Hoyos MD,  Maral Cortes MD, Jon Chen MD, Chip Prather DO, Ana Marx MD, Cecille Lloyd MD, Santi Rivera DO, Jonah Estrella MD, Hany Schwab MD, Monika Higgins MD, Daja Lucia MD, Anca Murphy DO, Susan Saha MD, Jyoti eKbede MD, Ricky Moody, Monico Patino, CNP, Kathleen Benites, CNP, Minerva Kingsley, CNP,  Alisha Potts, DNP, Rose Marie Culp, CNP, Ariana Medina, CNP, Santiago Evans, CNP, Saida Acosta, CNP, Venice Davidson, CNP, HARSHAL AlcantaraC, Zuly Smith, CNS, Daren Wayne, DNP, Alfonso George, CNP, Boni Black, CNP, Jennifer Gutierrez, CNP         [facility]   [address] [facility]    Progress Note    11/30/2022    10:53 AM    Name:   Jasmin Tavarez  MRN:     [id number]     Acct:      [de-identified]   Room:   02/02  IP Day:  0  Admit Date:  11/29/2022  4:14 PM    PCP:   Yan Otoole DO  Code Status:  Full Code    Subjective:     C/C:   Chief Complaint   Patient presents with    Chest Pain     C/o midsternal chest pain starting around 12 today     Interval History Status: improved. Feeling good enough to go home, but will keep 1 more night see if can wean o2    Lab work reviewed there is no evidence of ROLY  He is not anemic  PT/INR also unremarkable  Sed rate marginally elevated otherwise inflammatory markers unremarkable  Decadron has been ordered  Paxil in order to pharmacy to be delivered to his bedside he can take while hospitalized    Brief History:     72 presents with sudden onset chest pain he was also noted to be COVID-19 positive and is currently requiring 2 to 3 L nasal cannula delivered via nasal cannula.     He was initially being evaluated as ACS rule out due to T wave inversions in lateral leads. He had a heart cath in 2019 that revealed minimal to no CAD. He is currently chest pain-free he has had 1 fever 101.6 T-max  Blood pressure is currently elevated however stable  Lab work unremarkable  Qualifies for Decadron we will order packs lipid to be delivered to his but he can take that as well    Review of Systems:     Constitutional:  negative for chills, fevers, sweats  Respiratory:  negative for cough, dyspnea on exertion, shortness of breath, wheezing  Cardiovascular:  positive  for chest pain, chest pressure/discomfort, lower extremity edema, palpitations  Gastrointestinal:  negative for abdominal pain, constipation, diarrhea, nausea, vomiting  Neurological:  negative for dizziness, headache    Medications: Allergies:  No Known Allergies    Current Meds:   Scheduled Meds:    sodium chloride flush  5-40 mL IntraVENous 2 times per day    enoxaparin  40 mg SubCUTAneous Daily    dexamethasone  6 mg Oral Daily     Continuous Infusions:    sodium chloride       PRN Meds: sodium chloride flush, sodium chloride, potassium chloride **OR** potassium alternative oral replacement **OR** potassium chloride, magnesium sulfate, ondansetron **OR** ondansetron, polyethylene glycol, acetaminophen **OR** acetaminophen    Data:     Past Medical History:   has a past medical history of Arthritis, BPH (benign prostatic hyperplasia), Candida esophagitis ([address] Esophageal hiatus hernia, GERD (gastroesophageal reflux disease), History of dysphagia, History of esophageal stricture, History of ileus, History of radiation therapy, Poor dentition, Prostate cancer ([address] Sleep apnea, and Smoker. Social History:   reports that he has been smoking cigarettes. He has a 30.00 pack-year smoking history. He has never used smokeless tobacco. He reports that he does not drink alcohol and does not use drugs.      Family History:   Family History   Problem Relation Age of Onset    Other Mother         breathing problems    High Blood Pressure Father        Vitals:  BP (!) 140/76   Pulse 63   Temp 98.6 °F (37 °C) (Oral)   Resp 21   Ht 5' 7\" (1.702 m)   Wt 210 lb (95.3 kg)   SpO2 95%   BMI 32.89 kg/m²   Temp (24hrs), Av.1 °F (37.8 °C), Min:98.6 °F (37 °C), Max:101.6 °F (38.7 °C)    No results for input(s): POCGLU in the last 72 hours. I/O (24Hr): No intake or output data in the 24 hours ending 22 1053    Labs:  Hematology:  Recent Labs     22  0548   WBC 6.2  --    RBC 4.37  --    HGB 13.2  --    HCT 41.0  --    MCV 93.8  --    MCH 30.2  --    MCHC 32.2  --    RDW 15.3*  --      --    MPV 10.4  --    SEDRATE 26*  --    CRP 3.4  --    INR  --  1.1   DDIMER 0.60  --      Chemistry:  Recent Labs     22  1923 22  0222 22  0548     --   --  138   K 4.2  --   --  4.5     --   --  106   CO2 22  --   --  23   GLUCOSE 90  --   --  123*   BUN 12  --   --  16   CREATININE 1.00  --   --  0.86   MG 2.0  --   --   --    ANIONGAP 14  --   --  9   LABGLOM >60  --   --  >60   CALCIUM 9.4  --   --  8.9   TROPHS 13 12 7  --      Recent Labs     22   PROT 7.2   LABALBU 4.3   AST 14   ALT 12   ALKPHOS 91   BILITOT 0.2*   LIPASE 46     ABG:No results found for: POCPH, PHART, PH, POCPCO2, OKK3AMI, PCO2, POCPO2, PO2ART, PO2, POCHCO3, BKH1ZQL, HCO3, NBEA, PBEA, BEART, BE, THGBART, THB, BHV3LBI, XMXM1PVJ, J6XGCKOK, O2SAT, FIO2  Lab Results   Component Value Date/Time    SPECIAL NOT REPORTED 2018 08:56 AM     Lab Results   Component Value Date/Time    CULTURE  2017 08:59 AM     INAPPROPRIATE SPECIMEN FOR FUNGAL CULTURE TOMER GI UNIT NOTIFIED 855    CULTURE  2017 08:59 AM     [employer] [address] [place] [address], [address] [place] [address] [phone]       Radiology:  XR CHEST PORTABLE    Result Date: 2022  No acute cardiopulmonary abnormality is identified.        Physical Examination:        General appearance:  alert, cooperative and no distress  Mental Status:  oriented to person, place and time and normal affect  Lungs:  clear to auscultation bilaterally, normal effort  Heart:  regular rate and rhythm, no murmur  Abdomen:  soft, nontender, nondistended, normal bowel sounds, no masses, hepatomegaly, splenomegaly  Extremities:  no edema, redness, tenderness in the calves  Skin:  no gross lesions, rashes, induration    Assessment:        Hospital Problems             Last Modified POA    * (Principal) Hypoxia 11/29/2022 Yes    Uncontrolled hypertension 11/29/2022 Yes    COVID-19 11/29/2022 Yes    Chest pain 11/29/2022 Yes    Tobacco use 11/29/2022 Yes    Obesity (BMI 30-39.9) 11/29/2022 Yes       Plan:        Chest pain 2/2 covid  Troponin, ekg reviewed - he did have some TWI inferolateral distribution - however lhc from 2019 shows no evidence of CAD  No need for vaccination 3 mo till after release from hospital  Recommend f/u with pcp rest of immunizations    Mac Siddiqi MD  11/30/2022  10:53 AM

## 2022-11-30 NOTE — ED NOTES
The following labs labeled with pt sticker BY [place] and tubed to lab:     [] Blue     [] Lavender   [] on ice  [x] Green/yellow  [] Green/black [] on ice  [] Yellow  [] Red  [] Pink      [] COVID-19 swab    [] Rapid  [] PCR  [] Flu Swab  [] Strep Swab  [] Peds Viral Panel     [] Urine Sample  [] Pelvic Cultures  [] Blood Cultures   [] Wound Cultures          Tacho Ramachandran RN  11/29/22 1931

## 2022-11-30 NOTE — ED NOTES
Pt given breakfast tray by RN.  Pt sitting up in bed eating breakfast tray no verbalized needs at this time      Maine Hensley RN  11/30/22 [id number]

## 2022-11-30 NOTE — CONSULTS
Attestation signed by      Attending Physician Statement:    Additional Comments: Agree with evaluation and plan below. Hypoxia. Covid positive. Chest tightness. Atypical. Troponin negative. Cath in 2019 showed mild CAD. Will observe for now and trend troponin. [facility]    Consult / H&P               Today's Date: 11/30/2022  Patient Name: Kristen Breen  Date of admission: 11/29/2022  4:14 PM  Patient's age: 67 y.o., 1950  Admission Dx: Uncontrolled hypertension [I10]    Reason for Consult:  Cardiac evaluation    Requesting Physician: Becka Dela Cruz MD    CHIEF COMPLAINT:  chets pain     History Obtained From:  chart revoiew     HISTORY OF PRESENT ILLNESS:      The patient is a 67 y.o. with past medical history of hypertension not taking any medication. Patient with above past medical history actually presented with substernal pressure-like chest pain that started yesterday at around 12 PM, 10 x 10, nonradiating. never had such pain . Patient denies sob or chest pain on exertion . Patient came to ED and was found hypoxic, placed on 2 L nasal cannula. febrile ,covid positive . Tropes negative x2. EKG was showing ST-T changes concerning for anterolateral ischemia. Apparently present in 2019 as well. Cath done at that time was showing minimal disease with normal EF of 65 %         Past Medical History:   has a past medical history of Arthritis, BPH (benign prostatic hyperplasia), Candida esophagitis ([address] Esophageal hiatus hernia, GERD (gastroesophageal reflux disease), History of dysphagia, History of esophageal stricture, History of ileus, History of radiation therapy, Poor dentition, Prostate cancer ([address] Sleep apnea, and Smoker. Past Surgical History:   has a past surgical history that includes Endoscopy, colon, diagnostic; Prostate biopsy (05/2015); Upper gastrointestinal endoscopy (01/30/2017);  Upper gastrointestinal endoscopy (02/13/2017); pr esophagogastroduodenoscopy transoral diagnostic (N/A, 02/13/2017); pr esophagogastroduodenoscopy transoral diagnostic (03/17/2017); pr egd balloon dilation esophagus <30 mm diam (N/A, 04/28/2017); pr egd balloon dilation esophagus <30 mm diam (N/A, 05/19/2017); Upper gastrointestinal endoscopy (N/A, 06/02/2017); Upper gastrointestinal endoscopy (N/A, 05/29/2018); Colonoscopy (05/29/2018); Colonoscopy (05/29/2018); Upper gastrointestinal endoscopy (N/A, 06/28/2018); Upper gastrointestinal endoscopy (N/A, 12/13/2018); Upper gastrointestinal endoscopy (12/13/2018); Total hip arthroplasty (Bilateral); Upper gastrointestinal endoscopy (N/A, 01/15/2019); Knee arthroscopy (Right); Upper gastrointestinal endoscopy (N/A, 09/17/2019); Upper gastrointestinal endoscopy (N/A, 11/12/2019); Upper gastrointestinal endoscopy (N/A, 07/21/2020); Upper gastrointestinal endoscopy (07/21/2020); and Upper gastrointestinal endoscopy (N/A, 11/18/2021). Home Medications:    Prior to Admission medications    Medication Sig Start Date End Date Taking? Authorizing Provider   Psyllium 0.36 g CAPS Take by mouth as needed    Historical Provider, MD   promethazine (PHENERGAN) 25 MG tablet Take 25 mg by mouth every 6 hours    Historical Provider, MD   Docusate Calcium (STOOL SOFTENER PO) Take 100 mg by mouth as needed (constipation)     Historical Provider, MD   omeprazole (PRILOSEC) 40 MG delayed release capsule Take 40 mg by mouth every morning (before breakfast)    Historical Provider, MD          Allergies:  Patient has no known allergies. Social History:   reports that he has been smoking cigarettes. He has a 30.00 pack-year smoking history. He has never used smokeless tobacco. He reports that he does not drink alcohol and does not use drugs. Family History: family history includes High Blood Pressure in his father; Other in his mother. No h/o sudden cardiac death. Yes for premature CAD    REVIEW OF SYSTEMS:    Constitutional: there has been no unanticipated weight loss. There's been No change in energy level, No change in activity level. Eyes: No visual changes or diplopia. No scleral icterus. ENT: No Headaches  Cardiovascular: No cardiac history  Respiratory: No previous pulmonary problems, No cough  Gastrointestinal: No abdominal pain. No change in bowel or bladder habits. Genitourinary: No dysuria, trouble voiding, or hematuria. Musculoskeletal:  No gait disturbance, No weakness or joint complaints. Integumentary: No rash or pruritis. Neurological: No headache, diplopia, change in muscle strength, numbness or tingling. No change in gait, balance, coordination, mood, affect, memory, mentation, behavior. Psychiatric: No anxiety, or depression. Endocrine: No temperature intolerance. No excessive thirst, fluid intake, or urination. No tremor. Hematologic/Lymphatic: No abnormal bruising or bleeding, blood clots or swollen lymph nodes. Allergic/Immunologic: No nasal congestion or hives. PHYSICAL EXAM:      BP (!) 140/76   Pulse 77   Temp 98.6 °F (37 °C) (Oral)   Resp 16   Ht 5' 7\" (1.702 m)   Wt 210 lb (95.3 kg)   SpO2 97%   BMI 32.89 kg/m²    Constitutional and General Appearance: alert, cooperative, no distress and appears stated age  HEENT: PERRL, no cervical lymphadenopathy. No masses palpable. Normal oral mucosa  Respiratory:  Normal excursion and expansion without use of accessory muscles  Resp Auscultation: Good respiratory effort. No for increased work of breathing. On auscultation: clear to auscultation bilaterally  Cardiovascular:   The apical impulse is not displaced  Heart tones are crisp and normal. regular S1 and S2.  Jugular venous pulsation Normal  The carotid upstroke is normal in amplitude and contour without delay or bruit  Peripheral pulses are symmetrical and full   Abdomen:   No masses or tenderness  Bowel sounds present  Extremities:   No Cyanosis or Clubbing   Lower extremity edema: No   Skin: Warm and dry  Neurological:  Alert and oriented. Moves all extremities well  No abnormalities of mood, affect, memory, mentation, or behavior are noted    DATA:    Diagnostics:    EKG: anterolateral changes   ECHO: 65 % in 2019  Ejection fraction: 65%      Labs:     CBC:   Recent Labs     11/29/22  1702   WBC 6.2   HGB 13.2   HCT 41.0        BMP:   Recent Labs     11/29/22  1702 11/30/22  0548    138   K 4.2 4.5   CO2 22 23   BUN 12 16   CREATININE 1.00 0.86   LABGLOM >60 >60   GLUCOSE 90 123*     BNP: No results for input(s): BNP in the last 72 hours. PT/INR:   Recent Labs     11/30/22  0548   PROTIME 11.5   INR 1.1     APTT:No results for input(s): APTT in the last 72 hours. CARDIAC ENZYMES:No results for input(s): CKTOTAL, CKMB, CKMBINDEX, TROPONINI in the last 72 hours. FASTING LIPID PANEL:  Lab Results   Component Value Date/Time    HDL 42 11/20/2014 12:00 AM    LDLCALC 88 11/20/2014 12:00 AM    TRIG 39 11/20/2014 12:00 AM     LIVER PROFILE:  Recent Labs     11/29/22  1702   AST 14   ALT 12   LABALBU 4.3       IMPRESSION:    Patient Active Problem List   Diagnosis    BPH (benign prostatic hyperplasia)    Esophageal stricture    S/P TURP    Chest pain    Ileus (HCC)    Hypokalemia    Tobacco use    Labile blood pressure    GERD (gastroesophageal reflux disease)    History of radiation therapy    Sleep apnea    Obesity (BMI 30-39. 9)    Hypocalcemia    Nausea    Slow transit constipation    ROLY (acute kidney injury) ([facility]    Uncontrolled hypertension    Hypoxia    COVID-19       Assesment and plan :  Atypical chest pain. Uncontrolled hypertension  COVID-19  History of smoking    Plan: Will start antihypertensive  No active intervention at this point. Ekg changes consistent with 2019 cath   Covid managemwent per primary   Discussed with patient nd Nurse.     Hitesh Lentz MD  Internal Medicine Resident, PGY3   [address] [place] [address],  [address]  11/30/2022,7:44 AM

## 2022-11-30 NOTE — H&P
[facility]  Office: [address], DO, Luis Enrique Patella, DO, Jerica Prey, DO, Selena Horton Blood, DO, Rodolfo Mcclelland MD, Sharyn Valerio MD, Carolina Morris MD, Patricia Jane MD,  Viola Farias MD, Leann Mensah MD, Alec Heart, DO, Hanna Dunn MD,  Carolyn Duran MD, Tracie Aguilar MD, January Asencio, DO, Flavio Partida MD, Joseph Dudley MD, Jennifer Kaminski DO, Claudia Martins MD, Uemsh Foster MD, Matthieu Chin MD, Felisa Montes MD, Antonietta Crum DO, Giselle Mina MD, Arabella Schulte MD, Jillian Burrows, CNP,  Ashutosh Gillette, CNP, Noé Al, CNP, Merwyn Cooks, CNP,  Larisa Olsen, DNP, Khloe Edwards, CNP, Tavares Burger, CNP, Shakeel Aviles, CNP, Neil Mijares, CNP, French Ramey, CNP, Jack Taylor PARioC, Mena Watts, CNS, Brian Piña, DNP, Tara Lu, CNP, Gonzalez Robin, CNP, Andry Gill, CNP         [address] [facility]    HISTORY AND PHYSICAL EXAMINATION            Date:   11/29/2022  Patient name:  Janice Cooper  Date of admission:  11/29/2022  4:14 PM  MRN:   [id number]  Account:  [de-identified]  YOB: 1950  PCP:    Rebecca Blair DO  Room:   28/28  Code Status:    Full Code    Chief Complaint:     Chief Complaint   Patient presents with    Chest Pain     C/o midsternal chest pain starting around 12 today       History Obtained From:     patient, electronic medical record    History of Present Illness:     Janice Cooper is a 67 y.o. Non- / non  male who presents with Chest Pain (C/o midsternal chest pain starting around 12 today)   and is admitted to the hospital for the management of Hypoxia. Mr. Janice Cooper is a, cooperative 66-year-old male presenting for evaluation of midsternal chest pain which began this afternoon and not accompanied by radiation, diaphoresis, nausea or vomiting. He describes the pain as \"bad\" without any further descriptors.   He states he does not take any home medications. He denies cardiac history although per chart he did undergo a cardiac catheterization in 2019 for recurrent angina. He denies SOB, fever or chills. He was found to be COVID-positive. He is vaccinated x4. He is a smoker.     Past Medical History:     Past Medical History:   Diagnosis Date    Arthritis     BPH (benign prostatic hyperplasia)     Candida esophagitis ([facility] [address] 2017    Esophageal hiatus hernia     GERD (gastroesophageal reflux disease)     History of dysphagia     several EGDs with dilation    History of esophageal stricture     History of ileus 2019    History of radiation therapy     for prostate cancer    Poor dentition     Prostate cancer ([facility] [address] 2017    states scheduled for surgery in the next month    Sleep apnea     no machine, says uses nasal pillows     Smoker         Past Surgical History:     Past Surgical History:   Procedure Laterality Date    COLONOSCOPY  2018    COLONOSCOPY POLYPECTOMY SNARE/COLD BIOPSY performed by Carlos Navarro MD at [facility]    COLONOSCOPY  2018    COLONOSCOPY WITH BIOPSY performed by Carlos Navarro MD at [facility]    ENDOSCOPY, COLON, DIAGNOSTIC      KNEE ARTHROSCOPY Right     meniscus    MI EGD BALLOON DILATION ESOPHAGUS <30 MM DIAM N/A 2017    EGD ESOPHAGOGASTRODUODENOSCOPY DILATATION performed by Carlos Navarro MD at [facility]    MI  [address] <30 MM DIAM N/A 2017    EGD ESOPHAGOGASTRODUODENOSCOPY DILATATION performed by Carlos Navarro MD at [facility]    MI ESOPHAGOGASTRODUODENOSCOPY TRANSORAL DIAGNOSTIC N/A 2017    EGD ESOPHAGOGASTRODUODENOSCOPY  AND PEDIATRIC SCOPE performed by Roopa De La Vega MD at [facility] ESOPHAGOGASTRODUODENOSCOPY TRANSORAL DIAGNOSTIC  2017    EGD ESOPHAGOGASTRODUODENOSCOPY performed by Carlos Navarro MD at [facility]  2015    [address] Bilateral     UPPER GASTROINTESTINAL ENDOSCOPY  01/30/2017    egd with dilation    UPPER GASTROINTESTINAL ENDOSCOPY  02/13/2017    UPPER GASTROINTESTINAL ENDOSCOPY N/A 06/02/2017    EGD ESOPHAGOGASTRODUODENOSCOPY WITH DILATION SAVORY DILATORS 14-15-16MM performed by Jarertt Scott MD at [address] [place] [address],[facility] 05/29/2018    EGD DIAGNOSTIC ONLY performed by Jarrett Scott MD at [address] [place] [address],[facility] 06/28/2018    EGD DILATION SAVORY performed by Jarrett Scott MD at [address] [place] [address],[facility] 12/13/2018    EGD BIOPSY performed by Jarrett Scott MD at [address] [place] [address],[facility]  12/13/2018    EGD DILATION SAVORY performed by Jarrett Scott MD at [address] [place] [address],[facility] 01/15/2019    EGD DILATION SAVORY performed by Joshua Vazquez MD at [address] [place] [address],[facility] N/A 09/17/2019    EGD DILATION SAVORY performed by Jarrett Scott MD at [address] [place] [address],[facility] 11/12/2019    EGD DILATION SAVORY performed by Jarrett Scott MD at [address] [place] [address],[facility] 07/21/2020    EGD BIOPSY performed by Jarrett Scott MD at [address] [place] [address],[facility]  07/21/2020    EGD DILATION SAVORY performed by Jarrett Scott MD at [address] [place] [address],[facility] N/A 11/18/2021    EGD ESOPHAGOGASTRODUODENOSCOPY DILATATION performed by Jarrett Scott MD at [facility]        Medications Prior to Admission:     Prior to Admission medications    Medication Sig Start Date End Date Taking?  Authorizing Provider   Psyllium 0.36 g CAPS Take by mouth as needed    Historical Provider, MD   promethazine (PHENERGAN) 25 MG tablet Take 25 mg by mouth every 6 hours    Historical Provider, MD   Docusate Calcium (STOOL SOFTENER PO) Take 100 mg by mouth as needed (constipation)     Historical Provider, MD   omeprazole (PRILOSEC) 40 MG delayed release capsule Take 40 mg by mouth every morning (before breakfast)    Historical Provider, MD        Allergies:     Patient has no known allergies. Social History:     Tobacco:    reports that he has been smoking cigarettes. He has a 30.00 pack-year smoking history. He has never used smokeless tobacco.  Alcohol:      reports no history of alcohol use. Drug Use:  reports no history of drug use. Family History:     Family History   Problem Relation Age of Onset    Other Mother         breathing problems    High Blood Pressure Father        Review of Systems:     Positive and Negative as described in HPI. Review of Systems   Constitutional:  Negative for chills, fatigue and fever. HENT:  Negative for congestion and sinus pain. Eyes:  Negative for photophobia, redness and visual disturbance. Respiratory:  Positive for cough and chest tightness. Negative for shortness of breath. Cardiovascular:  Positive for chest pain. Negative for leg swelling. Gastrointestinal:  Negative for diarrhea and nausea. Endocrine: Negative for polydipsia, polyphagia and polyuria. Genitourinary:  Negative for dysuria, hematuria and urgency. Musculoskeletal:  Negative for back pain and myalgias. Skin:  Negative for pallor, rash and wound. Allergic/Immunologic: Negative for environmental allergies, food allergies and immunocompromised state. Neurological:  Negative for seizures, weakness and headaches. Hematological:  Negative for adenopathy. Does not bruise/bleed easily. Psychiatric/Behavioral:  Negative for confusion. The patient is not nervous/anxious. All other systems reviewed and are negative.     Physical Exam:   BP (!) 175/76   Pulse 82   Temp (!) 101.6 °F (38.7 °C) (Oral)   Resp 18   Ht 5' 7\" (1.702 m)   Wt 210 lb (95.3 kg)   SpO2 98%   BMI 32.89 kg/m²   Temp (24hrs), Av.6 °F (38.7 °C), Min:101.6 °F (38.7 °C), Max:101.6 °F (38.7 °C)    No results for input(s): POCGLU in the last 72 hours. No intake or output data in the 24 hours ending 11/29/22 2233    Physical Exam  Vitals and nursing note reviewed. Constitutional:       General: He is not in acute distress. Appearance: Normal appearance. He is well-developed and normal weight. He is not ill-appearing. Interventions: Nasal cannula in place. HENT:      Mouth/Throat:      Mouth: Mucous membranes are moist.      Pharynx: Oropharynx is clear. Eyes:      Pupils: Pupils are equal, round, and reactive to light. Cardiovascular:      Rate and Rhythm: Normal rate and regular rhythm. Pulmonary:      Effort: Pulmonary effort is normal.      Breath sounds: Normal breath sounds. Abdominal:      General: Bowel sounds are normal.      Palpations: Abdomen is soft. Musculoskeletal:         General: Normal range of motion. Cervical back: Normal range of motion. Skin:     General: Skin is warm and dry. Capillary Refill: Capillary refill takes less than 2 seconds. Neurological:      General: No focal deficit present. Mental Status: He is alert and oriented to person, place, and time. Psychiatric:         Mood and Affect: Mood normal.         Behavior: Behavior is cooperative. Thought Content:  Thought content normal.       Investigations:      Laboratory Testing:  Recent Results (from the past 24 hour(s))   CBC    Collection Time: 11/29/22  5:02 PM   Result Value Ref Range    WBC 6.2 3.5 - 11.3 k/uL    RBC 4.37 4.21 - 5.77 m/uL    Hemoglobin 13.2 13.0 - 17.0 g/dL    Hematocrit 41.0 40.7 - 50.3 %    MCV 93.8 82.6 - 102.9 fL    MCH 30.2 25.2 - 33.5 pg    MCHC 32.2 28.4 - 34.8 g/dL    RDW 15.3 (H) 11.8 - 14.4 %    Platelets 420 979 - 003 k/uL    MPV 10.4 8.1 - 13.5 fL    NRBC Automated 0.0 0.0 per 100 WBC   Comprehensive Metabolic Panel    Collection Time: 11/29/22  5:02 PM   Result Value Ref Range    Glucose 90 70 - 99 mg/dL BUN 12 8 - 23 mg/dL    Creatinine 1.00 0.70 - 1.20 mg/dL    Est, Glom Filt Rate >60 >60 mL/min/1.73m2    Calcium 9.4 8.6 - 10.4 mg/dL    Sodium 139 135 - 144 mmol/L    Potassium 4.2 3.7 - 5.3 mmol/L    Chloride 103 98 - 107 mmol/L    CO2 22 20 - 31 mmol/L    Anion Gap 14 9 - 17 mmol/L    Alkaline Phosphatase 91 40 - 129 U/L    ALT 12 5 - 41 U/L    AST 14 <40 U/L    Total Bilirubin 0.2 (L) 0.3 - 1.2 mg/dL    Total Protein 7.2 6.4 - 8.3 g/dL    Albumin 4.3 3.5 - 5.2 g/dL    Albumin/Globulin Ratio 1.5 1.0 - 2.5   Troponin    Collection Time: 11/29/22  5:02 PM   Result Value Ref Range    Troponin, High Sensitivity 13 0 - 22 ng/L   Lipase    Collection Time: 11/29/22  5:02 PM   Result Value Ref Range    Lipase 46 13 - 60 U/L   Magnesium    Collection Time: 11/29/22  5:02 PM   Result Value Ref Range    Magnesium 2.0 1.6 - 2.6 mg/dL   C-Reactive Protein    Collection Time: 11/29/22  5:02 PM   Result Value Ref Range    CRP 3.4 0.0 - 5.0 mg/L   D-Dimer, Quantitative    Collection Time: 11/29/22  5:02 PM   Result Value Ref Range    D-Dimer, Quant 0.60 mg/L FEU   Sedimentation Rate    Collection Time: 11/29/22  5:02 PM   Result Value Ref Range    Sed Rate 26 (H) 0 - 20 mm/Hr   COVID-19, Rapid    Collection Time: 11/29/22  5:04 PM    Specimen: Nasopharyngeal Swab   Result Value Ref Range    Specimen Description . NASOPHARYNGEAL SWAB     SARS-CoV-2, Rapid DETECTED (A) Not Detected   RAPID INFLUENZA A/B ANTIGENS    Collection Time: 11/29/22  5:19 PM    Specimen: Nasopharyngeal   Result Value Ref Range    Flu A Antigen NEGATIVE NEGATIVE    Flu B Antigen NEGATIVE NEGATIVE   Troponin    Collection Time: 11/29/22  7:23 PM   Result Value Ref Range    Troponin, High Sensitivity 12 0 - 22 ng/L       Imaging/Diagnostics:  XR CHEST PORTABLE    Result Date: 11/29/2022  No acute cardiopulmonary abnormality is identified.        Assessment :      Hospital Problems             Last Modified POA    * (Principal) Hypoxia 11/29/2022 Yes Uncontrolled hypertension 11/29/2022 Yes    COVID-19 11/29/2022 Yes    Chest pain 11/29/2022 Yes    Tobacco use 11/29/2022 Yes    Obesity (BMI 30-39.9) 11/29/2022 Yes       Plan:     Patient status observation in the Med/Surge    COVID-19 with hypoxia requiring 2 L nasal cannula. Continue supplemental oxygen wean as tolerated. Oral steroid treatment. Droplet plus isolation. Inflammatory markers negative. Will obtain new set in a.m. Chest pain: Resolved. Cardiology to follow for EKG changes. High-sensitivity troponin negative x2 results. Will continue to trend. EKG with any chest pain. Analgesia as needed. Pending echocardiogram.  Manage hypertension: Improved without treatment. Continue to monitor. As needed antihypertensives. Tobacco cessation education. Healthy weight maintenance education    Consultations:   IP CONSULT TO HOSPITALIST  IP CONSULT TO CARDIOLOGY     Patient is admitted as inpatient status because of co-morbidities listed above, severity of signs and symptoms as outlined, requirement for current medical therapies and most importantly because of direct risk to patient if care not provided in a hospital setting. Expected length of stay > 48 hours.     ROSIE Miller NP  11/29/2022  10:35 PM    Copy sent to Dr. Blank Sarah DO

## 2022-12-02 LAB
EKG ATRIAL RATE: 84 BPM
EKG P AXIS: 47 DEGREES
EKG P-R INTERVAL: 180 MS
EKG Q-T INTERVAL: 374 MS
EKG QRS DURATION: 82 MS
EKG QTC CALCULATION (BAZETT): 441 MS
EKG R AXIS: 9 DEGREES
EKG T AXIS: 171 DEGREES
EKG VENTRICULAR RATE: 84 BPM

## 2022-12-02 PROCEDURE — 93010 ELECTROCARDIOGRAM REPORT: CPT | Performed by: INTERNAL MEDICINE

## 2024-07-31 ENCOUNTER — ANESTHESIA (OUTPATIENT)
Dept: OPERATING ROOM | Age: 74
End: 2024-07-31
Payer: MEDICARE

## 2024-07-31 ENCOUNTER — ANESTHESIA EVENT (OUTPATIENT)
Dept: OPERATING ROOM | Age: 74
End: 2024-07-31
Payer: MEDICARE

## 2024-07-31 ENCOUNTER — HOSPITAL ENCOUNTER (OUTPATIENT)
Age: 74
Setting detail: OUTPATIENT SURGERY
Discharge: HOME | End: 2024-07-31
Attending: INTERNAL MEDICINE | Admitting: INTERNAL MEDICINE
Payer: MEDICARE

## 2024-07-31 VITALS
WEIGHT: 238 LBS | SYSTOLIC BLOOD PRESSURE: 144 MMHG | DIASTOLIC BLOOD PRESSURE: 83 MMHG | BODY MASS INDEX: 34.07 KG/M2 | RESPIRATION RATE: 18 BRPM | TEMPERATURE: 96.8 F | HEIGHT: 70 IN | HEART RATE: 58 BPM | OXYGEN SATURATION: 97 %

## 2024-07-31 PROCEDURE — 3700000000 HC ANESTHESIA ATTENDED CARE: Performed by: INTERNAL MEDICINE

## 2024-07-31 PROCEDURE — 6360000002 HC RX W HCPCS: Performed by: NURSE ANESTHETIST, CERTIFIED REGISTERED

## 2024-07-31 PROCEDURE — 2500000003 HC RX 250 WO HCPCS: Performed by: NURSE ANESTHETIST, CERTIFIED REGISTERED

## 2024-07-31 PROCEDURE — 7100000011 HC PHASE II RECOVERY - ADDTL 15 MIN: Performed by: INTERNAL MEDICINE

## 2024-07-31 PROCEDURE — 7100000010 HC PHASE II RECOVERY - FIRST 15 MIN: Performed by: INTERNAL MEDICINE

## 2024-07-31 PROCEDURE — 2709999900 HC NON-CHARGEABLE SUPPLY: Performed by: INTERNAL MEDICINE

## 2024-07-31 PROCEDURE — 2580000003 HC RX 258: Performed by: ANESTHESIOLOGY

## 2024-07-31 PROCEDURE — 3609017100 HC EGD: Performed by: INTERNAL MEDICINE

## 2024-07-31 RX ORDER — LIDOCAINE HYDROCHLORIDE 20 MG/ML
INJECTION, SOLUTION EPIDURAL; INFILTRATION; INTRACAUDAL; PERINEURAL PRN
Status: DISCONTINUED | OUTPATIENT
Start: 2024-07-31 | End: 2024-07-31 | Stop reason: SDUPTHER

## 2024-07-31 RX ORDER — SODIUM CHLORIDE 9 MG/ML
INJECTION, SOLUTION INTRAVENOUS PRN
Status: DISCONTINUED | OUTPATIENT
Start: 2024-07-31 | End: 2024-07-31 | Stop reason: HOSPADM

## 2024-07-31 RX ORDER — PROPOFOL 10 MG/ML
INJECTION, EMULSION INTRAVENOUS CONTINUOUS PRN
Status: DISCONTINUED | OUTPATIENT
Start: 2024-07-31 | End: 2024-07-31 | Stop reason: SDUPTHER

## 2024-07-31 RX ORDER — SODIUM CHLORIDE 0.9 % (FLUSH) 0.9 %
5-40 SYRINGE (ML) INJECTION PRN
Status: DISCONTINUED | OUTPATIENT
Start: 2024-07-31 | End: 2024-07-31 | Stop reason: HOSPADM

## 2024-07-31 RX ORDER — LIDOCAINE HYDROCHLORIDE 10 MG/ML
1 INJECTION, SOLUTION EPIDURAL; INFILTRATION; INTRACAUDAL; PERINEURAL
Status: DISCONTINUED | OUTPATIENT
Start: 2024-08-01 | End: 2024-07-31 | Stop reason: HOSPADM

## 2024-07-31 RX ORDER — SODIUM CHLORIDE, SODIUM LACTATE, POTASSIUM CHLORIDE, CALCIUM CHLORIDE 600; 310; 30; 20 MG/100ML; MG/100ML; MG/100ML; MG/100ML
INJECTION, SOLUTION INTRAVENOUS CONTINUOUS
Status: DISCONTINUED | OUTPATIENT
Start: 2024-07-31 | End: 2024-07-31 | Stop reason: HOSPADM

## 2024-07-31 RX ORDER — SODIUM CHLORIDE 9 MG/ML
INJECTION, SOLUTION INTRAVENOUS CONTINUOUS
Status: DISCONTINUED | OUTPATIENT
Start: 2024-07-31 | End: 2024-07-31 | Stop reason: HOSPADM

## 2024-07-31 RX ORDER — SODIUM CHLORIDE 0.9 % (FLUSH) 0.9 %
5-40 SYRINGE (ML) INJECTION EVERY 12 HOURS SCHEDULED
Status: DISCONTINUED | OUTPATIENT
Start: 2024-07-31 | End: 2024-07-31 | Stop reason: HOSPADM

## 2024-07-31 RX ADMIN — LIDOCAINE HYDROCHLORIDE 80 MG: 20 INJECTION, SOLUTION EPIDURAL; INFILTRATION; INTRACAUDAL; PERINEURAL at 11:31

## 2024-07-31 RX ADMIN — PROPOFOL 150 MCG/KG/MIN: 10 INJECTION, EMULSION INTRAVENOUS at 11:31

## 2024-07-31 RX ADMIN — SODIUM CHLORIDE, POTASSIUM CHLORIDE, SODIUM LACTATE AND CALCIUM CHLORIDE: 600; 310; 30; 20 INJECTION, SOLUTION INTRAVENOUS at 10:31

## 2024-07-31 ASSESSMENT — PAIN - FUNCTIONAL ASSESSMENT
PAIN_FUNCTIONAL_ASSESSMENT: FACE, LEGS, ACTIVITY, CRY, AND CONSOLABILITY (FLACC)
PAIN_FUNCTIONAL_ASSESSMENT: 0-10

## 2024-07-31 ASSESSMENT — ENCOUNTER SYMPTOMS: SHORTNESS OF BREATH: 0

## 2024-07-31 NOTE — ANESTHESIA POSTPROCEDURE EVALUATION
Department of Anesthesiology  Postprocedure Note    Patient: Cristiano Salazar  MRN: [id number]  YOB: 1950  Date of evaluation: 7/31/2024    Procedure Summary       Date: 07/31/24 Room / Location: [address]    Anesthesia Start: 1129 Anesthesia Stop: 1140    Procedure: ESOPHAGOGASTRODUODENOSCOPY DILATATION (Esophagus) Diagnosis:       Esophageal dysphagia      (Esophageal dysphagia [R13.19])    Surgeons: Sourav Mulligan MD Responsible Provider: Nitin Ernandez MD    Anesthesia Type: MAC ASA Status: 3            Anesthesia Type: No value filed.    Neal Phase I: Neal Score: 10    Neal Phase II:      Anesthesia Post Evaluation    Airway patency: patent  Cardiovascular status: hemodynamically stable  Respiratory status: acceptable    No notable events documented.

## 2024-07-31 NOTE — H&P
History and Physical Service   [facility]    HISTORY AND PHYSICAL EXAMINATION            Date of Evaluation: 7/31/2024  Patient name:  Cristiano Salazar  MRN:   [id number]  YOB: 1950  PCP:    Elgin Vazquez DO    History Obtained From:     Patient, Medical record    History of Present Illness:     This is Cristiano Salazar a 74 y.o. male who presents today for an ESOPHAGOGASTRODUODENOSCOPY DILATATION by Dr. Mulligan, Sourav TAMAYO MD for Esophageal dysphagia. History of candida esophagitis and esophageal stricture. Known GERD, dysphagia (S/p multiple EGDs with dilation), and esophageal hiatus hernia. Pt states his last EGD with dilatation was in 2021. Patient's chief complaint is dysphagia with fluid and food for the past 1 1/2 months. Pt denies choking, black tarry stools, diarrhea, constipation, nausea, vomiting, fever, chills, night sweats, bloating, abdominal pain, and unexplained weight loss. Pt denies history of ulcers, IBS, and diabetes. Pt denies taking blood thinning medications in the past 10 days.    Past Medical History:     Past Medical History:   Diagnosis Date    Arthritis     BPH (benign prostatic hyperplasia)     Candida esophagitis (HCC) 03/2017    Esophageal hiatus hernia     GERD (gastroesophageal reflux disease)     History of dysphagia     several EGDs with dilation    History of esophageal stricture     History of ileus 04/2019    History of radiation therapy     for prostate cancer    Poor dentition     Prostate cancer (HCC) 04/2017    states scheduled for surgery in the next month    Sleep apnea     wears cpap    Smoker         Past Surgical History:     Past Surgical History:   Procedure Laterality Date    COLONOSCOPY  05/29/2018    COLONOSCOPY POLYPECTOMY SNARE/COLD BIOPSY performed by Sourav Mulligan MD at [facility]    COLONOSCOPY  05/29/2018    COLONOSCOPY WITH BIOPSY performed by Sourav Mulligan MD at [facility]    ENDOSCOPY, COLON, DIAGNOSTIC

## 2024-07-31 NOTE — ANESTHESIA PRE PROCEDURE
Department of Anesthesiology  Preprocedure Note       Name:  Cristiano Salazar   Age:  74 y.o.  :  1950                                          MRN:  [id number]         Date:  2024      Surgeon: Surgeon(s):  Sourav Mulligan MD    Procedure: Procedure(s):  ESOPHAGOGASTRODUODENOSCOPY DILATATION    Medications prior to admission:   Prior to Admission medications    Medication Sig Start Date End Date Taking? Authorizing Provider   Psyllium 0.36 g CAPS Take by mouth as needed    Sydni Wick MD   promethazine (PHENERGAN) 25 MG tablet Take 1 tablet by mouth every 6 hours    Sydni Wick MD   Docusate Calcium (STOOL SOFTENER PO) Take 100 mg by mouth as needed (constipation)     Sydni Wick MD   omeprazole (PRILOSEC) 40 MG delayed release capsule Take 1 capsule by mouth every morning (before breakfast)    ProviderSydni MD       Current medications:    Current Facility-Administered Medications   Medication Dose Route Frequency Provider Last Rate Last Admin   • [START ON 2024] lidocaine PF 1 % injection 1 mL  1 mL IntraDERmal Once PRN Stephen Barber DO       • 0.9 % sodium chloride infusion   IntraVENous Continuous Stephen Barber, DO       • lactated ringers IV soln infusion   IntraVENous Continuous Stephen Barber  mL/hr at 24 1031 New Bag at 24 1031   • sodium chloride flush 0.9 % injection 5-40 mL  5-40 mL IntraVENous 2 times per day Stephen Barber, DO       • sodium chloride flush 0.9 % injection 5-40 mL  5-40 mL IntraVENous PRN Stephen Barber, DO       • 0.9 % sodium chloride infusion   IntraVENous PRN Stephen Barber DO           Allergies:  No Known Allergies    Problem List:    Patient Active Problem List   Diagnosis Code   • BPH (benign prostatic hyperplasia) N40.0   • Esophageal stricture K22.2   • S/P TURP Z90.79   • Chest pain R07.9   • Ileus (HCC) K56.7   • Hypokalemia E87.6   • Tobacco use Z72.0   • Labile blood

## 2024-07-31 NOTE — DISCHARGE INSTRUCTIONS
[facility]    POST-ENDOSCOPY INSTRUCTIONS    1. ACTIVITY     No driving, operating machinery, or making important decisions for 24 hours.      Resume normal activity after 24 hours.  You may return to work after 24 hours.    2. DIET      Resume your usual diet unless specified.                Diet Modification: none    3. MEDICATIONS       (Do not consume alcohol, tranquilizers, or sleeping medications for 24 hours unless advised by your physician)                 Resume your usual medications.    4. PHYSICIAN FOLLOW-UP                 Please call the office, at [phone], for:  pathology results / appointment / instructions.                  See your primary care physician as planned.    .      5. NORMAL CHANGES YOU MAY EXPERIENCE AFTER ENDOSCOPY:       UPPER ENDOSCOPY (EGD or ERCP):  Sore throat.           6. CALL YOUR PHYSICIAN IF YOU EXPERIENCE ANY OF THE FOLLOWING      A.  Passing blood rectally or vomiting blood (color may be red or black)      B.  Severe abdominal pain or tenderness (that is not relieved by passing air)      C.  Fever, chills, or excessive sweating      D.  Persistent nausea or vomiting      E.  Redness or swelling at the IV site    If you have additional questions, PLEASE call your doctor or the [facility] GI Unit ([phone])

## 2024-07-31 NOTE — OP NOTE
[facility]  EGD    Patient: Cristiano Salazar            Date:   2024  : 1950       Med Rec#: [id number]     PROCEDURE:  EGD  INDICATION: Dysphagia    FINDINGS  Moderate/severe candida esophagitis; entire esophagus involved.    LA grade B esophagitis at GEJ    Diffuse narrowing and scaring throughout the entire esophagus.  Two discrete moderate benign-appearing strictures noted, one at the GEJ and one near the cricopharyngeus.      Several small diverticula noted in the distal esophagus    Small hiatal hernia was noted on retroflexion.     Mild antral gastritis and bulbar duodenitis.      PLAN  Increase omeprazole to 40 mg TWICE daily  Initiate therapy with nystatin swish and swallow 5 mg p.o. 4 times daily for 14 days.  We will coordinate the timing of this with his next EGD (ideally, we want minimal time to elapse between the end of therapy and the next EGD)  Repeat EGD in approximately 1 month to evaluate esophagus and dilate as needed.    MEDICATIONS:  MAC    ESTIMATED BLOOD LOSS:  none  Specimen(s) Removed: As stated above  COMPLICATIONS: none  Prior to the procedure, the patient's history was reviewed and a directed physical examination was performed.   Informed consent was obtained.  After adequate sedation was achieved, the scope was inserted into the mouth and advanced to the second portion of the duodenum.  As the scope was withdrawn, the anatomy and the appearance of the mucosa was noted.     Aldo Mulligan M.D.

## 2024-09-04 ENCOUNTER — ANESTHESIA (OUTPATIENT)
Dept: OPERATING ROOM | Age: 74
End: 2024-09-04
Payer: MEDICARE

## 2024-09-04 ENCOUNTER — HOSPITAL ENCOUNTER (OUTPATIENT)
Age: 74
Setting detail: OUTPATIENT SURGERY
Discharge: HOME | End: 2024-09-04
Attending: INTERNAL MEDICINE | Admitting: INTERNAL MEDICINE
Payer: MEDICARE

## 2024-09-04 ENCOUNTER — ANESTHESIA EVENT (OUTPATIENT)
Dept: OPERATING ROOM | Age: 74
End: 2024-09-04
Payer: MEDICARE

## 2024-09-04 VITALS
SYSTOLIC BLOOD PRESSURE: 155 MMHG | TEMPERATURE: 97.3 F | OXYGEN SATURATION: 98 % | DIASTOLIC BLOOD PRESSURE: 73 MMHG | HEART RATE: 56 BPM | RESPIRATION RATE: 16 BRPM | WEIGHT: 240 LBS | BODY MASS INDEX: 34.36 KG/M2 | HEIGHT: 70 IN

## 2024-09-04 PROCEDURE — 2580000003 HC RX 258

## 2024-09-04 PROCEDURE — 7100000011 HC PHASE II RECOVERY - ADDTL 15 MIN: Performed by: INTERNAL MEDICINE

## 2024-09-04 PROCEDURE — 7100000010 HC PHASE II RECOVERY - FIRST 15 MIN: Performed by: INTERNAL MEDICINE

## 2024-09-04 PROCEDURE — C1769 GUIDE WIRE: HCPCS | Performed by: INTERNAL MEDICINE

## 2024-09-04 PROCEDURE — 3700000000 HC ANESTHESIA ATTENDED CARE: Performed by: INTERNAL MEDICINE

## 2024-09-04 PROCEDURE — 3609017700 HC EGD DILATION GASTRIC/DUODENAL STRICTURE: Performed by: INTERNAL MEDICINE

## 2024-09-04 PROCEDURE — 2500000003 HC RX 250 WO HCPCS: Performed by: SPECIALIST

## 2024-09-04 PROCEDURE — 6360000002 HC RX W HCPCS: Performed by: SPECIALIST

## 2024-09-04 PROCEDURE — 2709999900 HC NON-CHARGEABLE SUPPLY: Performed by: INTERNAL MEDICINE

## 2024-09-04 RX ORDER — LIDOCAINE HYDROCHLORIDE 20 MG/ML
INJECTION, SOLUTION EPIDURAL; INFILTRATION; INTRACAUDAL; PERINEURAL PRN
Status: DISCONTINUED | OUTPATIENT
Start: 2024-09-04 | End: 2024-09-04 | Stop reason: SDUPTHER

## 2024-09-04 RX ORDER — AMMONIUM LACTATE 12 G/100G
CREAM TOPICAL 2 TIMES DAILY
COMMUNITY
Start: 2024-08-23

## 2024-09-04 RX ORDER — SODIUM CHLORIDE 9 MG/ML
INJECTION, SOLUTION INTRAVENOUS PRN
Status: DISCONTINUED | OUTPATIENT
Start: 2024-09-04 | End: 2024-09-04 | Stop reason: HOSPADM

## 2024-09-04 RX ORDER — METOCLOPRAMIDE HYDROCHLORIDE 5 MG/ML
10 INJECTION INTRAMUSCULAR; INTRAVENOUS
Status: DISCONTINUED | OUTPATIENT
Start: 2024-09-04 | End: 2024-09-04 | Stop reason: HOSPADM

## 2024-09-04 RX ORDER — NALOXONE HYDROCHLORIDE 0.4 MG/ML
INJECTION, SOLUTION INTRAMUSCULAR; INTRAVENOUS; SUBCUTANEOUS PRN
Status: DISCONTINUED | OUTPATIENT
Start: 2024-09-04 | End: 2024-09-04 | Stop reason: HOSPADM

## 2024-09-04 RX ORDER — NYSTATIN 100000/ML
SUSPENSION, ORAL (FINAL DOSE FORM) ORAL
Status: ON HOLD | COMMUNITY
Start: 2024-08-07 | End: 2024-09-04

## 2024-09-04 RX ORDER — SODIUM CHLORIDE 0.9 % (FLUSH) 0.9 %
5-40 SYRINGE (ML) INJECTION PRN
Status: DISCONTINUED | OUTPATIENT
Start: 2024-09-04 | End: 2024-09-04 | Stop reason: HOSPADM

## 2024-09-04 RX ORDER — PROPOFOL 10 MG/ML
INJECTION, EMULSION INTRAVENOUS PRN
Status: DISCONTINUED | OUTPATIENT
Start: 2024-09-04 | End: 2024-09-04 | Stop reason: SDUPTHER

## 2024-09-04 RX ORDER — SODIUM CHLORIDE 0.9 % (FLUSH) 0.9 %
5-40 SYRINGE (ML) INJECTION EVERY 12 HOURS SCHEDULED
Status: DISCONTINUED | OUTPATIENT
Start: 2024-09-04 | End: 2024-09-04 | Stop reason: HOSPADM

## 2024-09-04 RX ORDER — SODIUM CHLORIDE 9 MG/ML
INJECTION, SOLUTION INTRAVENOUS CONTINUOUS
Status: DISCONTINUED | OUTPATIENT
Start: 2024-09-04 | End: 2024-09-04 | Stop reason: HOSPADM

## 2024-09-04 RX ORDER — HALOPERIDOL 5 MG/ML
1 INJECTION INTRAMUSCULAR
Status: DISCONTINUED | OUTPATIENT
Start: 2024-09-04 | End: 2024-09-04 | Stop reason: HOSPADM

## 2024-09-04 RX ORDER — LIDOCAINE HYDROCHLORIDE 10 MG/ML
1 INJECTION, SOLUTION EPIDURAL; INFILTRATION; INTRACAUDAL; PERINEURAL
Status: DISCONTINUED | OUTPATIENT
Start: 2024-09-05 | End: 2024-09-04 | Stop reason: HOSPADM

## 2024-09-04 RX ORDER — SODIUM CHLORIDE, SODIUM LACTATE, POTASSIUM CHLORIDE, CALCIUM CHLORIDE 600; 310; 30; 20 MG/100ML; MG/100ML; MG/100ML; MG/100ML
INJECTION, SOLUTION INTRAVENOUS CONTINUOUS
Status: DISCONTINUED | OUTPATIENT
Start: 2024-09-04 | End: 2024-09-04 | Stop reason: HOSPADM

## 2024-09-04 RX ADMIN — PROPOFOL 50 MG: 10 INJECTION, EMULSION INTRAVENOUS at 11:30

## 2024-09-04 RX ADMIN — PROPOFOL 150 MG: 10 INJECTION, EMULSION INTRAVENOUS at 11:28

## 2024-09-04 RX ADMIN — PROPOFOL 50 MG: 10 INJECTION, EMULSION INTRAVENOUS at 11:36

## 2024-09-04 RX ADMIN — SODIUM CHLORIDE, POTASSIUM CHLORIDE, SODIUM LACTATE AND CALCIUM CHLORIDE: 600; 310; 30; 20 INJECTION, SOLUTION INTRAVENOUS at 10:43

## 2024-09-04 RX ADMIN — PROPOFOL 50 MG: 10 INJECTION, EMULSION INTRAVENOUS at 11:33

## 2024-09-04 RX ADMIN — LIDOCAINE HYDROCHLORIDE 100 MG: 20 INJECTION, SOLUTION EPIDURAL; INFILTRATION; INTRACAUDAL; PERINEURAL at 11:28

## 2024-09-04 ASSESSMENT — LIFESTYLE VARIABLES: SMOKING_STATUS: 1

## 2024-09-04 ASSESSMENT — ENCOUNTER SYMPTOMS: SHORTNESS OF BREATH: 0

## 2024-09-04 NOTE — OP NOTE
[facility]  EGD  Patient: Cristiano Salazar            Date:   2024  : 1950       Med Rec#: [id number]     PROCEDURE:  EGD with Savary dilatation (14 mm, 15 mm, 16 mm)  INDICATION: Dysphagia    FINDINGS  Diffuse Candida esophagitis  Diffuse narrowing and scarring throughout the entire esophagus.  Several small diverticuli were noted.  Two moderate benign-appearing strictures were noted, one at the gastroesophageal junction and one more proximally.  Serial dilatation with 14 mm, 15 mm, 16 mm wire-guided savory was performed.  Resistance mild to moderate.  A small hiatal hernia was noted on retroflexion.   The upper GI tract was otherwise normal.    PLAN  Nystatin swish and swallow 5 mL p.o. 4 times daily for 14 days  Repeat EGD with dilatation as needed   Tobacco cessation  Continue PPI therapy    MEDICATIONS:  MAC    ESTIMATED BLOOD LOSS:   minimal  Specimen(s) Removed: As stated above  COMPLICATIONS: No immediate complications  Prior to the procedure, the patient's history was reviewed and a directed physical examination was performed.   Informed consent was obtained.  After adequate sedation was achieved, the scope was inserted into the mouth and advanced to  the second portion of the duodenum.  As the scope was withdrawn, the anatomy and the appearance of the mucosa was noted.     Aldo Mulligan M.D.

## 2024-09-04 NOTE — H&P
History and Physical Service   [facility]    HISTORY AND PHYSICAL EXAMINATION            Date of Evaluation: 9/4/2024  Patient name:  Cristiano Salazar  MRN:   [id number]  YOB: 1950  PCP:    Elgin Vazquez DO    History Obtained From:     Patient, medical records    History of Present Illness:     This is Cristiano Salazar a 74 y.o. male who presents today for a ESOPHAGOGASTRODUODENOSCOPY DILATATION by Sourav Mulligan MD for Dysphagia, unspecified type. Patient has long-standing history of esophageal stricture with multiple previous EGD dilations in the past. States over the last 4-5 months he has noticed slight intermittent dysphagia. Last dilation >2 years ago per patient. He denies bloody tarry stools, diarrhea alternating with constipation, nausea, vomiting, abdominal pain or unintentional weight loss. Patient followed bowel prep until watery clear.  Has had previous colonoscopy and EGD. No FH colon cancer or polyps. Denies fever, chills, shortness of breath, cough, congestion, wheezing, chest pain, open sores or wounds. Denies hx of diabetes. Denies any current blood thinning medications.     Past Medical History:     Past Medical History:   Diagnosis Date    Arthritis     BPH (benign prostatic hyperplasia)     Candida esophagitis (HCC) 03/2017    Esophageal hiatus hernia     GERD (gastroesophageal reflux disease)     History of dysphagia     several EGDs with dilation    History of esophageal stricture     History of ileus 04/2019    History of radiation therapy     for prostate cancer    Poor dentition     Prostate cancer (HCC) 04/2017    states scheduled for surgery in the next month    Sleep apnea     wears cpap    Smoker         Past Surgical History:     Past Surgical History:   Procedure Laterality Date    COLONOSCOPY  05/29/2018    COLONOSCOPY POLYPECTOMY SNARE/COLD BIOPSY performed by Sourav Mulligan MD at [facility]    COLONOSCOPY  05/29/2018    COLONOSCOPY WITH

## 2024-09-04 NOTE — ANESTHESIA PRE PROCEDURE
Current packs/day: 0.75     Average packs/day: 0.8 packs/day for 40.0 years (30.0 ttl pk-yrs)     Types: Cigarettes    Smokeless tobacco: Never   Substance Use Topics    Alcohol use: No     Alcohol/week: 0.0 standard drinks of alcohol                                Ready to quit: Not Answered  Counseling given: Not Answered      Vital Signs (Current):   Vitals:    09/04/24 1009 09/04/24 1022   BP: (!) 145/64    Pulse: 62    Resp: 18    Temp: 97.3 °F (36.3 °C)    SpO2: 96%    Weight:  108.9 kg (240 lb)   Height:  1.778 m (5' 10\")                                              BP Readings from Last 3 Encounters:   09/04/24 (!) 145/64   07/31/24 (!) 144/83   11/30/22 (!) 141/64       NPO Status: Time of last liquid consumption: 2130                        Time of last solid consumption: 2130                        Date of last liquid consumption: 09/03/24                        Date of last solid food consumption: 09/03/24    BMI:   Wt Readings from Last 3 Encounters:   09/04/24 108.9 kg (240 lb)   07/31/24 108 kg (238 lb)   11/29/22 95.3 kg (210 lb)     Body mass index is 34.44 kg/m².    CBC:   Lab Results   Component Value Date/Time    WBC 6.2 11/29/2022 05:02 PM    RBC 4.37 11/29/2022 05:02 PM    HGB 13.2 11/29/2022 05:02 PM    HCT 41.0 11/29/2022 05:02 PM    MCV 93.8 11/29/2022 05:02 PM    RDW 15.3 11/29/2022 05:02 PM     11/29/2022 05:02 PM       CMP:   Lab Results   Component Value Date/Time     11/30/2022 05:48 AM    K 4.5 11/30/2022 05:48 AM     11/30/2022 05:48 AM    CO2 23 11/30/2022 05:48 AM    BUN 16 11/30/2022 05:48 AM    CREATININE 0.86 11/30/2022 05:48 AM    GFRAA >60 04/25/2019 06:14 AM    LABGLOM >60 11/30/2022 05:48 AM    GLUCOSE 123 11/30/2022 05:48 AM    CALCIUM 8.9 11/30/2022 05:48 AM    BILITOT 0.2 11/29/2022 05:02 PM    ALKPHOS 91 11/29/2022 05:02 PM    AST 14 11/29/2022 05:02 PM    ALT 12 11/29/2022 05:02 PM       POC Tests: No results for input(s): \"POCGLU\", \"POCNA\", \"POCK\",

## 2024-09-04 NOTE — ANESTHESIA POSTPROCEDURE EVALUATION
Department of Anesthesiology  Postprocedure Note    Patient: Cristiano Salazar  MRN: [id number]  YOB: 1950  Date of evaluation: 9/4/2024    Procedure Summary       Date: 09/04/24 Room / Location: [address]    Anesthesia Start: 1126 Anesthesia Stop: 1146    Procedure: ESOPHAGOGASTRODUODENOSCOPY DILATATION with 14,15,16 Diagnosis:       Dysphagia, unspecified type      (Dysphagia, unspecified type [R13.10])    Surgeons: Sourav Mulligan MD Responsible Provider: Alonso Mendez MD    Anesthesia Type: MAC ASA Status: 3            Anesthesia Type: No value filed.    Neal Phase I: Neal Score: 10    Neal Phase II: Neal Score: 9    Anesthesia Post Evaluation    Patient location during evaluation: PACU  Patient participation: complete - patient participated  Level of consciousness: awake  Airway patency: patent  Nausea & Vomiting: no nausea  Cardiovascular status: blood pressure returned to baseline  Respiratory status: acceptable  Hydration status: euvolemic  Comments: Multimodal analgesia pain management as indicated by procedure  Multimodal analgesia pain management approach  Pain management: adequate    No notable events documented.

## 2024-09-04 NOTE — DISCHARGE INSTRUCTIONS
Upper GI Endoscopy: What to Expect at Home  Your Recovery  You may have a sore throat for a day or two after the test.  How can you care for yourself at home?  Activity  Rest as much as you need to after you go home.  You should be able to go back to your usual activities the day after the test.  Diet  Drink plenty of fluids (unless your doctor has told you not to).  Follow-up care is a key part of your treatment and safety. Be sure to make and go to all appointments, and call your doctor if you are having problems.  When should you call for help?  Call 911 anytime you think you may need emergency care. For example, call if:  You passed out (lost consciousness).  You cough up blood.  You vomit blood or what looks like coffee grounds.  You pass maroon or very bloody stools.  Call your doctor now or seek immediate medical care if:  You have trouble swallowing.  You have belly pain.  Your stools are black and tarlike or have streaks of blood.  You are sick to your stomach or cannot keep fluids down.  Watch closely for changes in your health, and be sure to contact your doctor if:  Your throat still hurts after a day or two.  You do not get better as expected.   Where can you learn more?   Go to [email or web address] and sign in to your [employer] account. Enter J454 in the Search Health Information box to learn more about “Upper GI Endoscopy: What to Expect at Home.”    .     © 3807-6291 [employer]. Care instructions adapted under license by [employer]. This care instruction is for use with your licensed healthcare professional. If you have questions about a medical condition or this instruction, always ask your healthcare professional. [employer] disclaims any warranty or liability for your use of this information.  Content Version: 9.9.364289; Last Revised: February 20, 2013

## 2025-04-03 ENCOUNTER — HOSPITAL ENCOUNTER (EMERGENCY)
Age: 75
Discharge: HOME | End: 2025-04-04
Attending: STUDENT IN AN ORGANIZED HEALTH CARE EDUCATION/TRAINING PROGRAM
Payer: MEDICARE

## 2025-04-03 ENCOUNTER — APPOINTMENT (OUTPATIENT)
Dept: CT IMAGING | Age: 75
End: 2025-04-03
Payer: MEDICARE

## 2025-04-03 DIAGNOSIS — M54.2 NECK PAIN: Primary | ICD-10-CM

## 2025-04-03 DIAGNOSIS — M48.02 CERVICAL STENOSIS OF SPINAL CANAL: ICD-10-CM

## 2025-04-03 DIAGNOSIS — R91.1 PULMONARY NODULE: ICD-10-CM

## 2025-04-03 LAB
ANION GAP SERPL CALCULATED.3IONS-SCNC: 13 MMOL/L (ref 9–16)
BUN SERPL-MCNC: 12 MG/DL (ref 8–23)
CALCIUM SERPL-MCNC: 8.8 MG/DL (ref 8.6–10.4)
CHLORIDE SERPL-SCNC: 107 MMOL/L (ref 98–107)
CO2 SERPL-SCNC: 21 MMOL/L (ref 20–31)
CREAT SERPL-MCNC: 0.9 MG/DL (ref 0.7–1.2)
GFR, ESTIMATED: 89 ML/MIN/1.73M2
GLUCOSE SERPL-MCNC: 104 MG/DL (ref 74–99)
POTASSIUM SERPL-SCNC: 4 MMOL/L (ref 3.7–5.3)
SODIUM SERPL-SCNC: 141 MMOL/L (ref 136–145)

## 2025-04-03 PROCEDURE — 99285 EMERGENCY DEPT VISIT HI MDM: CPT

## 2025-04-03 PROCEDURE — 70498 CT ANGIOGRAPHY NECK: CPT

## 2025-04-03 PROCEDURE — 6370000000 HC RX 637 (ALT 250 FOR IP)

## 2025-04-03 PROCEDURE — 96374 THER/PROPH/DIAG INJ IV PUSH: CPT

## 2025-04-03 PROCEDURE — 6360000004 HC RX CONTRAST MEDICATION

## 2025-04-03 PROCEDURE — 70450 CT HEAD/BRAIN W/O DYE: CPT

## 2025-04-03 PROCEDURE — 80048 BASIC METABOLIC PNL TOTAL CA: CPT

## 2025-04-03 PROCEDURE — 96375 TX/PRO/DX INJ NEW DRUG ADDON: CPT

## 2025-04-03 PROCEDURE — 96372 THER/PROPH/DIAG INJ SC/IM: CPT

## 2025-04-03 PROCEDURE — 72125 CT NECK SPINE W/O DYE: CPT

## 2025-04-03 PROCEDURE — 6360000002 HC RX W HCPCS

## 2025-04-03 RX ORDER — ACETAMINOPHEN 500 MG
1000 TABLET ORAL ONCE
Status: COMPLETED | OUTPATIENT
Start: 2025-04-03 | End: 2025-04-03

## 2025-04-03 RX ORDER — FENTANYL CITRATE 50 UG/ML
50 INJECTION, SOLUTION INTRAMUSCULAR; INTRAVENOUS ONCE
Status: COMPLETED | OUTPATIENT
Start: 2025-04-03 | End: 2025-04-03

## 2025-04-03 RX ORDER — DEXAMETHASONE SODIUM PHOSPHATE 10 MG/ML
10 INJECTION, SOLUTION INTRAMUSCULAR; INTRAVENOUS ONCE
Status: COMPLETED | OUTPATIENT
Start: 2025-04-03 | End: 2025-04-03

## 2025-04-03 RX ORDER — IBUPROFEN 600 MG/1
600 TABLET, FILM COATED ORAL ONCE
Status: COMPLETED | OUTPATIENT
Start: 2025-04-03 | End: 2025-04-03

## 2025-04-03 RX ORDER — ORPHENADRINE CITRATE 30 MG/ML
60 INJECTION INTRAMUSCULAR; INTRAVENOUS ONCE
Status: COMPLETED | OUTPATIENT
Start: 2025-04-03 | End: 2025-04-03

## 2025-04-03 RX ORDER — LIDOCAINE 4 G/G
1 PATCH TOPICAL DAILY
Status: DISCONTINUED | OUTPATIENT
Start: 2025-04-04 | End: 2025-04-04 | Stop reason: HOSPADM

## 2025-04-03 RX ORDER — LIDOCAINE 4 G/G
1 PATCH TOPICAL DAILY
Qty: 30 PATCH | Refills: 0 | Status: SHIPPED | OUTPATIENT
Start: 2025-04-03 | End: 2025-05-03

## 2025-04-03 RX ORDER — IOPAMIDOL 755 MG/ML
75 INJECTION, SOLUTION INTRAVASCULAR
Status: COMPLETED | OUTPATIENT
Start: 2025-04-03 | End: 2025-04-03

## 2025-04-03 RX ORDER — CYCLOBENZAPRINE HCL 10 MG
10 TABLET ORAL NIGHTLY PRN
Qty: 4 TABLET | Refills: 0 | Status: SHIPPED | OUTPATIENT
Start: 2025-04-03 | End: 2025-04-07

## 2025-04-03 RX ORDER — LIDOCAINE 4 G/G
1 PATCH TOPICAL DAILY
Status: DISCONTINUED | OUTPATIENT
Start: 2025-04-04 | End: 2025-04-03

## 2025-04-03 RX ADMIN — FENTANYL CITRATE 50 MCG: 50 INJECTION, SOLUTION INTRAMUSCULAR; INTRAVENOUS at 20:24

## 2025-04-03 RX ADMIN — ORPHENADRINE CITRATE 60 MG: 60 INJECTION INTRAMUSCULAR; INTRAVENOUS at 18:54

## 2025-04-03 RX ADMIN — IOPAMIDOL 75 ML: 755 INJECTION, SOLUTION INTRAVENOUS at 20:48

## 2025-04-03 RX ADMIN — IBUPROFEN 600 MG: 600 TABLET, FILM COATED ORAL at 18:53

## 2025-04-03 RX ADMIN — ACETAMINOPHEN 1000 MG: 500 TABLET ORAL at 23:53

## 2025-04-03 RX ADMIN — DEXAMETHASONE SODIUM PHOSPHATE 10 MG: 10 INJECTION, SOLUTION INTRAMUSCULAR; INTRAVENOUS at 23:53

## 2025-04-03 ASSESSMENT — PAIN DESCRIPTION - LOCATION
LOCATION: HEAD
LOCATION: HEAD;NECK
LOCATION: HEAD

## 2025-04-03 ASSESSMENT — PAIN DESCRIPTION - DESCRIPTORS
DESCRIPTORS: ACHING;POUNDING
DESCRIPTORS: ACHING;POUNDING
DESCRIPTORS: ACHING

## 2025-04-03 ASSESSMENT — PAIN SCALES - GENERAL
PAINLEVEL_OUTOF10: 10

## 2025-04-03 NOTE — ED PROVIDER NOTES
[facility] EMERGENCY DEPARTMENT  Emergency Department  Faculty Sign-Out Addendum     Care of Cristiano Salazar was assumed from previous attending and is being seen for Neck Pain and Headache  .  The patient's initial evaluation and plan have been discussed with the prior provider who initially evaluated the patient.    Handoff taken on the following patient from prior Attending Physician:    Attestation    I was available and discussed any additional care issues that arose and coordinated the management plans with the resident(s) caring for the patient during my duty period. Any areas of disagreement with resident’s documentation of care or procedures are noted on the chart. I was personally present for the key portions of any/all procedures during my duty period. I have documented in the chart those procedures where I was not present during the key portions.      EMERGENCY DEPARTMENT COURSE / MEDICAL DECISION MAKING:       MEDICATIONS GIVEN:  Orders Placed This Encounter   Medications    orphenadrine (NORFLEX) injection 60 mg    ibuprofen (ADVIL;MOTRIN) tablet 600 mg       LABS / RADIOLOGY:     Labs Reviewed - No data to display    No results found.    RECENT VITALS:     Temp: 97.7 °F (36.5 °C),  Pulse: 62, Respirations: 18, BP: (!) 219/72, SpO2: 98 %    This patient is a 75 y.o. Male with occipital headache and neck pain. Significantly hypertensive. Minimal improvement with sx treatment. CT CTA head neck pending    OUTSTANDING TASKS / RECOMMENDATIONS:    CTs      Rio Stubbs MD, FACEP, FAAEM  Attending Emergency Physician  [facility] EMERGENCY DEPARTMENT       Rio Stubbs MD  04/03/25 2001

## 2025-04-03 NOTE — ED PROVIDER NOTES
[facility]     Emergency Department     Faculty Attestation    I performed a history and physical examination of the patient and discussed management with the resident. I have reviewed and agree with the resident’s findings including all diagnostic interpretations, and treatment plans as written at the time of my review. Any areas of disagreement are noted on the chart. I was personally present for the key portions of any procedures. I have documented in the chart those procedures where I was not present during the key portions. For Physician Assistant/ Nurse Practitioner cases/documentation I have personally evaluated this patient and have completed at least one if not all key elements of the E/M (history, physical exam, and MDM). Additional findings are as noted.    PtName: Cristiano Salazar  MRN: [id number]  Birthdate 1950  Date of evaluation: 4/3/25  Note Started: 6:48 PM EDT    Primary Care Physician: Elgin Vazquez DO    Brief HPI: pain to the back of the neck at base of the skull, worse with movement and woke up after sleep with the pain. 3 days of symptoms. Has not tried any medication for relief.       Pertinent Physical Exam Findings:  Vitals:    04/03/25 1806   BP: (!) 175/72   Pulse: 72   Resp: 18   Temp: 97.7 °F (36.5 °C)   SpO2: 98%        Physical exam findings:  Neck at the base of the skull is tender, ROM is impaired due to pain, no meningismus    Medical Decision Making: Patient is a 75 y.o. male presenting to the emergency department with neck pain. The chart was reviewed for pertinent history relating to the chief complaint.  The patient appears stable.     ED Course: Differentials we considered with neck pain included meningitis, dissection of the carotid artery, SAH, cervical fractures    The patient experience no trauma so fracture is not likely. The pain is not consistent with a SAH picture. The carotid artery dissection seems unlikely  without manipulation such as chiropractics and/or MVC or something with rapid movement and/or decelerations.     No fever and no exposures to meningitis    We will attempt medical therapy with muscle relaxer and/or nerve block first. If this fails, will consider further testing. So far, this seems focal and MSK related.     All results, including labs (if ordered), imaging (if ordered), and EKGs (if ordered) were independently interpreted by me.  See radiologist report for additional details on imaging studies.      Pt pain is not well controlled. BP has only gotten higher. Will perform CT head and CTA head neck to r/o SAH, aneurysm as a cause of head and neck pain. Will give IV pain meds and reassess.     (Please note that portions of this note were completed with a voice recognition program.  Efforts were made to edit the dictations but occasionally words are mis-transcribed.)    Ricardo Stephens MD, YESICA  Attending Emergency Medicine Physician         Ricardo Stephens MD  04/03/25 1953

## 2025-04-03 NOTE — ED NOTES
Pt. Came to the ed for neck and head pain  Pt stated that his neck pain/ stiffness travels up to his head  Pt states that the head and neck pain is causing blurred vision blurriness comes on with pain  Pt. States that he takes no medications at home and has no other known comorbidities  Pt. Denies any injuries   Pt. Denies any loss of sensation or shortness of breath  Pt. Brought in from waiting room via wheel chair   RR even and unlabored

## 2025-04-03 NOTE — ED PROVIDER NOTES
spinal stenosis at  these levels.  3. Asymmetric soft tissue fullness at the level of the left base of the  tongue, and left supraglottic region. Dedicated MR imaging of the neck with  and without contrast recommended for further evaluation.   [AS]   2241 CTA HEAD NECK W CONTRAST  IMPRESSION:  Moderate stenosis in the V1 segments of the bilateral vertebral arteries     Otherwise, no acute abnormality or flow-limiting stenosis in the remainder of  the major arteries of the head and neck.     6.5 mm lung nodule in the right upper lobe.  Follow-up recommendation is  listed below.     Prominence of the bilateral lingual tonsils and thickening of the bilateral  aryepiglottic folds, likely related to inflammatory changes.     RECOMMENDATIONS:  Pathology: Right solid pulmonary nodule within the upper lobe measuring 7  mm.Per Fleischner Society Guidelines, recommend a non-contrast Chest CT at  6-12 months. If patient is high risk for malignancy, consider an additional  non-contrast Chest CT at 18-24 months. If patient is low risk for malignancy,  non-contrast Chest CT at 18-24 months is optional.These guidelines do not  apply to immunocompromised patients and patients with cancer. Follow up in  patients with significant comorbidities as clinically warranted. For lung  cancer screening, adhere to Lung-RADS guidelines. Reference: Radiology. 2017;  284(1):228-43.      [AS]   2350 Results discussed with patient including pulmonary nodule.  Discussed need for follow-up with PCP.  Discussed return precautions including numbness, weakness, worsening pain.  Patient expresses understanding and agreement with plan. [AS]      ED Course User Index  [AS] Galilea Stone DO       CONSULTS:  None    CRITICAL CARE:  There was significant risk of life threatening deterioration of patient's condition requiring my direct management. Critical care time 0 minutes, excluding any documented procedures.    FINAL IMPRESSION      1. Neck pain     2. Cervical stenosis of spinal canal    3. Pulmonary nodule          DISPOSITION / PLAN     DISPOSITION Decision To Discharge 04/03/2025 11:51:17 PM   DISPOSITION CONDITION Stable           PATIENT REFERRED TO:  Elgin Vazquez,   [address]  [place] [address]  [phone]    Schedule an appointment as soon as possible for a visit       [facility] Emergency Department  [address] [place] [address]  [phone]    If symptoms worsen, As needed      DISCHARGE MEDICATIONS:  Discharge Medication List as of 4/3/2025 11:51 PM        START taking these medications    Details   lidocaine 4 % external patch Place 1 patch onto the skin daily, TransDERmal, DAILY Starting Thu 4/3/2025, Until Sat 5/3/2025, For 30 days, Disp-30 patch, R-0, Normal      cyclobenzaprine (FLEXERIL) 10 MG tablet Take 1 tablet by mouth nightly as needed for Muscle spasms, Disp-4 tablet, R-0Normal             Galilea Stone DO  Emergency Medicine Resident    (Please note that portions of thisnote were completed with a voice recognition program.  Efforts were made to edit the dictations but occasionally words are mis-transcribed.)

## 2025-04-04 VITALS
BODY MASS INDEX: 35.55 KG/M2 | HEIGHT: 69 IN | RESPIRATION RATE: 16 BRPM | HEART RATE: 62 BPM | SYSTOLIC BLOOD PRESSURE: 159 MMHG | TEMPERATURE: 97.7 F | DIASTOLIC BLOOD PRESSURE: 68 MMHG | WEIGHT: 240 LBS | OXYGEN SATURATION: 98 %

## 2025-04-04 ASSESSMENT — PAIN SCALES - GENERAL: PAINLEVEL_OUTOF10: 10

## 2025-04-04 ASSESSMENT — ENCOUNTER SYMPTOMS
ABDOMINAL PAIN: 0
SHORTNESS OF BREATH: 0
PHOTOPHOBIA: 0

## 2025-04-04 ASSESSMENT — PAIN - FUNCTIONAL ASSESSMENT: PAIN_FUNCTIONAL_ASSESSMENT: 0-10

## 2025-04-04 NOTE — DISCHARGE INSTRUCTIONS
If given narcotics (opiates) or muscle relaxants during this Emergency Department visit, you should not drink, drive or operate any machinery for at least 6 hours.    Use an ice pack or bag filled with ice and apply to the injured area 3 - 4 times a day for 15 - 20 minutes each time.  If the injury is older than 3 days, then use a heating pad to help relax the muscles in your neck.    For pain use acetaminophen (Tylenol) or ibuprofen (Motrin / Advil), unless prescribed medications that have acetaminophen or ibuprofen (or similar medications) in it.  You can take over the counter acetaminophen tablets (1 - 2 tablets of the 500-mg strength every 6 hours) or ibuprofen tablets (3 tablets every 6 hours). You can take a flexeril at night for pain. Do not drive within 8 hours of taking the medication. You can also use the lidocaine patches as prescribed, 12 hours on and 12 hours off.    PLEASE RETURN TO THE EMERGENCY DEPARTMENT IMMEDIATELY for worsening symptoms, inability to move your legs, tingling or loss of sensation, or if you develop any concerning symptoms such as: high fever not relieved by acetaminophen (Tylenol) and/or ibuprofen (Motrin / Advil), chills, shortness of breath, chest pain, feeling of your heart fluttering or racing, persistent nausea and/or vomiting, vomiting up blood, blood in your stool, loss of consciousness, numbness, weakness or tingling in the arms or legs or change in color of the extremities, changes in mental status, persistent headache, blurry vision, loss of bladder / bowel control, unable to follow up with your physician, or other any other care or concern.    You do have a pulmonary nodule that we found on the CT scan.  Please follow-up with your primary care for further monitoring.

## 2025-04-16 ENCOUNTER — HOSPITAL ENCOUNTER (OUTPATIENT)
Dept: MRI IMAGING | Age: 75
Discharge: HOME | End: 2025-04-18
Payer: MEDICARE

## 2025-04-16 DIAGNOSIS — R93.89 ABNORMAL FINDINGS ON IMAGING TEST: ICD-10-CM

## 2025-04-16 DIAGNOSIS — R22.1 NECK SWELLING: ICD-10-CM

## 2025-04-16 PROCEDURE — 2500000003 HC RX 250 WO HCPCS: Performed by: PHYSICIAN ASSISTANT

## 2025-04-16 PROCEDURE — 6360000004 HC RX CONTRAST MEDICATION: Performed by: PHYSICIAN ASSISTANT

## 2025-04-16 PROCEDURE — 70543 MRI ORBT/FAC/NCK W/O &W/DYE: CPT

## 2025-04-16 PROCEDURE — A9576 INJ PROHANCE MULTIPACK: HCPCS | Performed by: PHYSICIAN ASSISTANT

## 2025-04-16 RX ORDER — SODIUM CHLORIDE 0.9 % (FLUSH) 0.9 %
10 SYRINGE (ML) INJECTION PRN
Status: DISCONTINUED | OUTPATIENT
Start: 2025-04-16 | End: 2025-04-19 | Stop reason: HOSPADM

## 2025-04-16 RX ADMIN — GADOTERIDOL 20 ML: 279.3 INJECTION, SOLUTION INTRAVENOUS at 08:47

## 2025-04-16 RX ADMIN — SODIUM CHLORIDE, PRESERVATIVE FREE 10 ML: 5 INJECTION INTRAVENOUS at 08:48

## 2025-04-22 ENCOUNTER — HOSPITAL ENCOUNTER (OUTPATIENT)
Dept: PREADMISSION TESTING | Age: 75
Discharge: HOME | End: 2025-04-26
Payer: MEDICARE

## 2025-04-22 NOTE — DISCHARGE INSTRUCTIONS
DAY OF SURGERY/PROCEDURE  GUIDELINES    As a patient at the [facility], you can expect quality medical and nursing care that is centered on your individual needs. It is our goal to make your surgical experience as comfortable and excellent as possible.  ________________________________________________________________________    The following instructions are general guidelines, if any information on this sheet is different from what your doctor has instructed you to do, please follow your doctor's instructions.    Please arrive on 4/25/2025 @ per surgeon      Enter through the hospital lobby, take the elevators to the second floor.  Check in at the surgery registration desk.    Upon arrival you will be taken to the pre-operative area to get ready for surgery, your family will stay in the waiting room and visit with you once you are ready for surgery. Due to special limitations please limit visitation to 1-2 members of your family at a time. When it is time for surgery your family will return to the waiting room.    Nothing to eat, drink, smoke, suck or chew after midnight (no water, gum, mints, cigarettes, cigars, pipes, snuff, chewing tobacco, etc.) or your surgery may be canceled.     Take a shower or bath the night before and morning of your surgery/procedure (chlorhexidine gluconate if directed) Do not apply any lotions.    Brush your teeth, but do not swallow any water    IN CASE OF ILLNESS - If you have a cold or flu symptoms (high fever, runny nose, sore throat, cough, etc.) rash, nausea, vomiting, loose stools, and/or recent contact with someone who has a contagious disease (chick pox, measles, etc.) please call your doctor before coming to the surgery center    Take a small sip of water with     If applicable bring your:  Inhaler (s)  Hearing aid(s)  Eyeglasses and Case (If you wear contacts they have to be removed before surgery, bring case and solution)  CPAP     DO NOT take anticoagulants

## 2025-04-23 ENCOUNTER — ANESTHESIA EVENT (OUTPATIENT)
Dept: OPERATING ROOM | Age: 75
End: 2025-04-23
Payer: MEDICARE

## 2025-04-23 LAB
EKG ATRIAL RATE: 64 BPM
EKG P AXIS: 31 DEGREES
EKG P-R INTERVAL: 186 MS
EKG Q-T INTERVAL: 454 MS
EKG QRS DURATION: 88 MS
EKG QTC CALCULATION (BAZETT): 468 MS
EKG R AXIS: 19 DEGREES
EKG T AXIS: -160 DEGREES
EKG VENTRICULAR RATE: 64 BPM

## 2025-04-25 ENCOUNTER — HOSPITAL ENCOUNTER (OUTPATIENT)
Age: 75
Setting detail: OUTPATIENT SURGERY
Discharge: HOME | End: 2025-04-25
Attending: OTOLARYNGOLOGY | Admitting: OTOLARYNGOLOGY
Payer: MEDICARE

## 2025-04-25 ENCOUNTER — ANESTHESIA (OUTPATIENT)
Dept: OPERATING ROOM | Age: 75
End: 2025-04-25
Payer: MEDICARE

## 2025-04-25 VITALS
BODY MASS INDEX: 34.54 KG/M2 | OXYGEN SATURATION: 96 % | SYSTOLIC BLOOD PRESSURE: 127 MMHG | DIASTOLIC BLOOD PRESSURE: 65 MMHG | HEART RATE: 64 BPM | WEIGHT: 233.2 LBS | HEIGHT: 69 IN | TEMPERATURE: 97.3 F | RESPIRATION RATE: 14 BRPM

## 2025-04-25 DIAGNOSIS — G89.18 POSTOPERATIVE PAIN: Primary | ICD-10-CM

## 2025-04-25 DIAGNOSIS — K14.8 TONGUE MASS: ICD-10-CM

## 2025-04-25 PROCEDURE — 88342 IMHCHEM/IMCYTCHM 1ST ANTB: CPT

## 2025-04-25 PROCEDURE — 88341 IMHCHEM/IMCYTCHM EA ADD ANTB: CPT

## 2025-04-25 PROCEDURE — 7100000011 HC PHASE II RECOVERY - ADDTL 15 MIN: Performed by: OTOLARYNGOLOGY

## 2025-04-25 PROCEDURE — 6360000002 HC RX W HCPCS: Performed by: OTOLARYNGOLOGY

## 2025-04-25 PROCEDURE — 88185 FLOWCYTOMETRY/TC ADD-ON: CPT

## 2025-04-25 PROCEDURE — 88307 TISSUE EXAM BY PATHOLOGIST: CPT

## 2025-04-25 PROCEDURE — 7100000000 HC PACU RECOVERY - FIRST 15 MIN: Performed by: OTOLARYNGOLOGY

## 2025-04-25 PROCEDURE — 6370000000 HC RX 637 (ALT 250 FOR IP): Performed by: ANESTHESIOLOGY

## 2025-04-25 PROCEDURE — 2709999900 HC NON-CHARGEABLE SUPPLY: Performed by: OTOLARYNGOLOGY

## 2025-04-25 PROCEDURE — 3700000001 HC ADD 15 MINUTES (ANESTHESIA): Performed by: OTOLARYNGOLOGY

## 2025-04-25 PROCEDURE — 3700000000 HC ANESTHESIA ATTENDED CARE: Performed by: OTOLARYNGOLOGY

## 2025-04-25 PROCEDURE — 6360000002 HC RX W HCPCS: Performed by: ANESTHESIOLOGY

## 2025-04-25 PROCEDURE — 2580000003 HC RX 258: Performed by: ANESTHESIOLOGY

## 2025-04-25 PROCEDURE — 7100000001 HC PACU RECOVERY - ADDTL 15 MIN: Performed by: OTOLARYNGOLOGY

## 2025-04-25 PROCEDURE — 6360000002 HC RX W HCPCS: Performed by: NURSE ANESTHETIST, CERTIFIED REGISTERED

## 2025-04-25 PROCEDURE — 88184 FLOWCYTOMETRY/ TC 1 MARKER: CPT

## 2025-04-25 PROCEDURE — 3600000012 HC SURGERY LEVEL 2 ADDTL 15MIN: Performed by: OTOLARYNGOLOGY

## 2025-04-25 PROCEDURE — 3600000002 HC SURGERY LEVEL 2 BASE: Performed by: OTOLARYNGOLOGY

## 2025-04-25 PROCEDURE — 2500000003 HC RX 250 WO HCPCS: Performed by: NURSE ANESTHETIST, CERTIFIED REGISTERED

## 2025-04-25 PROCEDURE — 2720000010 HC SURG SUPPLY STERILE: Performed by: OTOLARYNGOLOGY

## 2025-04-25 PROCEDURE — 7100000010 HC PHASE II RECOVERY - FIRST 15 MIN: Performed by: OTOLARYNGOLOGY

## 2025-04-25 PROCEDURE — 88305 TISSUE EXAM BY PATHOLOGIST: CPT

## 2025-04-25 RX ORDER — SODIUM CHLORIDE 9 MG/ML
INJECTION, SOLUTION INTRAVENOUS PRN
Status: DISCONTINUED | OUTPATIENT
Start: 2025-04-25 | End: 2025-04-25 | Stop reason: HOSPADM

## 2025-04-25 RX ORDER — FENTANYL CITRATE 50 UG/ML
INJECTION, SOLUTION INTRAMUSCULAR; INTRAVENOUS
Status: DISCONTINUED | OUTPATIENT
Start: 2025-04-25 | End: 2025-04-25 | Stop reason: SDUPTHER

## 2025-04-25 RX ORDER — OXYCODONE HYDROCHLORIDE 5 MG/1
5 TABLET ORAL
Status: DISCONTINUED | OUTPATIENT
Start: 2025-04-25 | End: 2025-04-25 | Stop reason: HOSPADM

## 2025-04-25 RX ORDER — SODIUM CHLORIDE 0.9 % (FLUSH) 0.9 %
5-40 SYRINGE (ML) INJECTION PRN
Status: DISCONTINUED | OUTPATIENT
Start: 2025-04-25 | End: 2025-04-25 | Stop reason: HOSPADM

## 2025-04-25 RX ORDER — ONDANSETRON 2 MG/ML
INJECTION INTRAMUSCULAR; INTRAVENOUS
Status: DISCONTINUED | OUTPATIENT
Start: 2025-04-25 | End: 2025-04-25 | Stop reason: SDUPTHER

## 2025-04-25 RX ORDER — OXYCODONE AND ACETAMINOPHEN 5; 325 MG/1; MG/1
1 TABLET ORAL EVERY 6 HOURS PRN
Qty: 28 TABLET | Refills: 0 | Status: SHIPPED | OUTPATIENT
Start: 2025-04-25 | End: 2025-04-30

## 2025-04-25 RX ORDER — SODIUM CHLORIDE, SODIUM LACTATE, POTASSIUM CHLORIDE, CALCIUM CHLORIDE 600; 310; 30; 20 MG/100ML; MG/100ML; MG/100ML; MG/100ML
INJECTION, SOLUTION INTRAVENOUS CONTINUOUS
Status: DISCONTINUED | OUTPATIENT
Start: 2025-04-25 | End: 2025-04-25 | Stop reason: HOSPADM

## 2025-04-25 RX ORDER — PREDNISONE 10 MG/1
20 TABLET ORAL DAILY
Qty: 6 TABLET | Refills: 0 | Status: SHIPPED | OUTPATIENT
Start: 2025-04-25 | End: 2025-04-28

## 2025-04-25 RX ORDER — SODIUM CHLORIDE 9 MG/ML
INJECTION, SOLUTION INTRAVENOUS CONTINUOUS
Status: DISCONTINUED | OUTPATIENT
Start: 2025-04-25 | End: 2025-04-25 | Stop reason: HOSPADM

## 2025-04-25 RX ORDER — PROPOFOL 10 MG/ML
INJECTION, EMULSION INTRAVENOUS
Status: DISCONTINUED | OUTPATIENT
Start: 2025-04-25 | End: 2025-04-25 | Stop reason: SDUPTHER

## 2025-04-25 RX ORDER — MEPERIDINE HYDROCHLORIDE 50 MG/ML
12.5 INJECTION INTRAMUSCULAR; INTRAVENOUS; SUBCUTANEOUS EVERY 5 MIN PRN
Status: DISCONTINUED | OUTPATIENT
Start: 2025-04-25 | End: 2025-04-25 | Stop reason: HOSPADM

## 2025-04-25 RX ORDER — ROCURONIUM BROMIDE 10 MG/ML
INJECTION, SOLUTION INTRAVENOUS
Status: DISCONTINUED | OUTPATIENT
Start: 2025-04-25 | End: 2025-04-25 | Stop reason: SDUPTHER

## 2025-04-25 RX ORDER — HYDROMORPHONE HYDROCHLORIDE 1 MG/ML
0.5 INJECTION, SOLUTION INTRAMUSCULAR; INTRAVENOUS; SUBCUTANEOUS EVERY 5 MIN PRN
Status: DISCONTINUED | OUTPATIENT
Start: 2025-04-25 | End: 2025-04-25 | Stop reason: HOSPADM

## 2025-04-25 RX ORDER — LABETALOL HYDROCHLORIDE 5 MG/ML
INJECTION, SOLUTION INTRAVENOUS
Status: DISCONTINUED | OUTPATIENT
Start: 2025-04-25 | End: 2025-04-25 | Stop reason: SDUPTHER

## 2025-04-25 RX ORDER — NALOXONE HYDROCHLORIDE 0.4 MG/ML
INJECTION, SOLUTION INTRAMUSCULAR; INTRAVENOUS; SUBCUTANEOUS PRN
Status: DISCONTINUED | OUTPATIENT
Start: 2025-04-25 | End: 2025-04-25 | Stop reason: HOSPADM

## 2025-04-25 RX ORDER — DIPHENHYDRAMINE HYDROCHLORIDE 50 MG/ML
12.5 INJECTION, SOLUTION INTRAMUSCULAR; INTRAVENOUS
Status: DISCONTINUED | OUTPATIENT
Start: 2025-04-25 | End: 2025-04-25 | Stop reason: HOSPADM

## 2025-04-25 RX ORDER — LIDOCAINE HYDROCHLORIDE 20 MG/ML
INJECTION, SOLUTION EPIDURAL; INFILTRATION; INTRACAUDAL; PERINEURAL
Status: DISCONTINUED | OUTPATIENT
Start: 2025-04-25 | End: 2025-04-25 | Stop reason: SDUPTHER

## 2025-04-25 RX ORDER — SODIUM CHLORIDE 0.9 % (FLUSH) 0.9 %
5-40 SYRINGE (ML) INJECTION EVERY 12 HOURS SCHEDULED
Status: DISCONTINUED | OUTPATIENT
Start: 2025-04-25 | End: 2025-04-25 | Stop reason: HOSPADM

## 2025-04-25 RX ORDER — OXYCODONE HYDROCHLORIDE 5 MG/1
5 TABLET ORAL ONCE
Status: COMPLETED | OUTPATIENT
Start: 2025-04-25 | End: 2025-04-25

## 2025-04-25 RX ORDER — DEXAMETHASONE SODIUM PHOSPHATE 10 MG/ML
INJECTION, SOLUTION INTRAMUSCULAR; INTRAVENOUS
Status: DISCONTINUED | OUTPATIENT
Start: 2025-04-25 | End: 2025-04-25 | Stop reason: SDUPTHER

## 2025-04-25 RX ORDER — PROCHLORPERAZINE EDISYLATE 5 MG/ML
10 INJECTION INTRAMUSCULAR; INTRAVENOUS
Status: DISCONTINUED | OUTPATIENT
Start: 2025-04-25 | End: 2025-04-25 | Stop reason: HOSPADM

## 2025-04-25 RX ORDER — FENTANYL CITRATE 50 UG/ML
25 INJECTION, SOLUTION INTRAMUSCULAR; INTRAVENOUS EVERY 5 MIN PRN
Status: DISCONTINUED | OUTPATIENT
Start: 2025-04-25 | End: 2025-04-25 | Stop reason: HOSPADM

## 2025-04-25 RX ORDER — LIDOCAINE HYDROCHLORIDE 10 MG/ML
1 INJECTION, SOLUTION EPIDURAL; INFILTRATION; INTRACAUDAL; PERINEURAL
Status: DISCONTINUED | OUTPATIENT
Start: 2025-04-26 | End: 2025-04-25 | Stop reason: HOSPADM

## 2025-04-25 RX ORDER — CEFAZOLIN SODIUM/WATER 2 G/20 ML
2000 SYRINGE (ML) INTRAVENOUS ONCE
Status: COMPLETED | OUTPATIENT
Start: 2025-04-25 | End: 2025-04-25

## 2025-04-25 RX ORDER — METOCLOPRAMIDE HYDROCHLORIDE 5 MG/ML
10 INJECTION INTRAMUSCULAR; INTRAVENOUS
Status: DISCONTINUED | OUTPATIENT
Start: 2025-04-25 | End: 2025-04-25 | Stop reason: HOSPADM

## 2025-04-25 RX ADMIN — SODIUM CHLORIDE, POTASSIUM CHLORIDE, SODIUM LACTATE AND CALCIUM CHLORIDE: 600; 310; 30; 20 INJECTION, SOLUTION INTRAVENOUS at 11:30

## 2025-04-25 RX ADMIN — ONDANSETRON 4 MG: 2 INJECTION, SOLUTION INTRAMUSCULAR; INTRAVENOUS at 12:24

## 2025-04-25 RX ADMIN — PROPOFOL 120 MG: 10 INJECTION, EMULSION INTRAVENOUS at 12:07

## 2025-04-25 RX ADMIN — ROCURONIUM BROMIDE 25 MG: 50 INJECTION INTRAVENOUS at 12:07

## 2025-04-25 RX ADMIN — DEXAMETHASONE SODIUM PHOSPHATE 10 MG: 10 INJECTION, SOLUTION INTRAMUSCULAR; INTRAVENOUS at 12:19

## 2025-04-25 RX ADMIN — SUGAMMADEX 200 MG: 100 INJECTION, SOLUTION INTRAVENOUS at 12:51

## 2025-04-25 RX ADMIN — LABETALOL HYDROCHLORIDE 10 MG: 5 INJECTION, SOLUTION INTRAVENOUS at 12:44

## 2025-04-25 RX ADMIN — Medication 2000 MG: at 12:16

## 2025-04-25 RX ADMIN — OXYCODONE 5 MG: 5 TABLET ORAL at 14:51

## 2025-04-25 RX ADMIN — FENTANYL CITRATE 50 MCG: 50 INJECTION INTRAMUSCULAR; INTRAVENOUS at 12:07

## 2025-04-25 RX ADMIN — LIDOCAINE HYDROCHLORIDE 100 MG: 20 INJECTION, SOLUTION EPIDURAL; INFILTRATION; INTRACAUDAL; PERINEURAL at 12:07

## 2025-04-25 RX ADMIN — FENTANYL CITRATE 50 MCG: 50 INJECTION INTRAMUSCULAR; INTRAVENOUS at 12:40

## 2025-04-25 RX ADMIN — FENTANYL CITRATE 25 MCG: 50 INJECTION INTRAMUSCULAR; INTRAVENOUS at 14:48

## 2025-04-25 RX ADMIN — LABETALOL HYDROCHLORIDE 10 MG: 5 INJECTION, SOLUTION INTRAVENOUS at 12:23

## 2025-04-25 ASSESSMENT — PAIN SCALES - GENERAL
PAINLEVEL_OUTOF10: 8
PAINLEVEL_OUTOF10: 0

## 2025-04-25 ASSESSMENT — PAIN - FUNCTIONAL ASSESSMENT: PAIN_FUNCTIONAL_ASSESSMENT: FACE, LEGS, ACTIVITY, CRY, AND CONSOLABILITY (FLACC)

## 2025-04-25 NOTE — ANESTHESIA POSTPROCEDURE EVALUATION
Department of Anesthesiology  Postprocedure Note    Patient: Cristiano Salazar  MRN: [id number]  YOB: 1950  Date of evaluation: 4/25/2025    Procedure Summary       Date: 04/25/25 Room / Location: [address]    Anesthesia Start: 1202 Anesthesia Stop: 1303    Procedures:       DIRECT LARYNGOSCOPY WITH BIOPSY (Mouth)      LINGUAL TONSILLECTOMY (Mouth) Diagnosis:       Tongue mass      (Tongue mass [K14.8])    Surgeons: Donell Clemons MD Responsible Provider: Stephen Barber DO    Anesthesia Type: general ASA Status: 3            Anesthesia Type: No value filed.    Neal Phase I: Nael Score: 10    Neal Phase II: Neal Score: 10    Anesthesia Post Evaluation    Patient location during evaluation: PACU  Patient participation: complete - patient participated  Level of consciousness: awake and alert  Airway patency: patent  Nausea & Vomiting: no nausea and no vomiting  Cardiovascular status: hemodynamically stable  Respiratory status: acceptable  Hydration status: stable  Pain management: adequate    No notable events documented.

## 2025-04-25 NOTE — H&P
Interval H&P Note    Pt Name: Cristiano Salazar  MRN: [id number]  YOB: 1950  Date of evaluation: 4/25/2025      [x] I have reviewed in EPIC the progress note by Dr. Clemons dated 04/21/2025 for an Interval History and Physical note.     [x] I have examined  Cristiano Salazar, a 75 y.o. male.There are no changes to the patient who is scheduled for DIRECT LARYNGOSCOPY WITH BIOPSY, LINGUAL TONSILLECTOMY by Donell Clemons MD for Tongue mass. The patient denies new health changes, fever, chills, wheezing, cough, increased SOB, chest pain, open sores or wounds. Denies hx of diabetes. Denies any current blood thinning medications.     Vital signs: BP (!) 145/68   Pulse 65   Temp 98.1 °F (36.7 °C)   Resp 20   SpO2 98%     Allergies:  Patient has no known allergies.    Medications:    Prior to Admission medications    Medication Sig Start Date End Date Taking? Authorizing Provider   ammonium lactate (AMLACTIN) 12 % cream Apply topically 2 times daily APPLY TO AFFECTED AREA 8/23/24   Provider, MD Sydni       This is a 75 y.o. obese male who is pleasant, cooperative, alert and oriented x3, in no acute distress.    Heart: Heart sounds are normal. HR 65 regular rate and rhythm without murmur, gallop or rub.   Lungs: Normal respiratory effort with equal expansion, good air exchange, unlabored and clear to auscultation without wheezes or rales bilaterally   Abdomen: soft, nontender, nondistended with bowel sounds active.     Labs:  Recent Labs     04/03/25 2031      K 4.0      CO2 21   BUN 12   CREATININE 0.9   GLUCOSE 104*       No results for input(s): \"COVID19\" in the last 720 hours.    ROSIE Ibanez CNP  Electronically signed 4/25/2025 at 11:08 AM

## 2025-04-25 NOTE — ANESTHESIA PRE PROCEDURE
>60 11/30/2022 05:48 AM    GLUCOSE 104 04/03/2025 08:31 PM    CALCIUM 8.8 04/03/2025 08:31 PM    BILITOT 0.2 11/29/2022 05:02 PM    ALKPHOS 91 11/29/2022 05:02 PM    AST 14 11/29/2022 05:02 PM    ALT 12 11/29/2022 05:02 PM       POC Tests: No results for input(s): \"POCGLU\", \"POCNA\", \"POCK\", \"POCCL\", \"POCBUN\", \"POCHEMO\", \"POCHCT\" in the last 72 hours.    Coags:   Lab Results   Component Value Date/Time    PROTIME 11.5 11/30/2022 05:48 AM    INR 1.1 11/30/2022 05:48 AM       HCG (If Applicable): No results found for: \"PREGTESTUR\", \"PREGSERUM\", \"HCG\", \"HCGQUANT\"     ABGs: No results found for: \"PHART\", \"PO2ART\", \"IEA2UCT\", \"CZO7WGN\", \"BEART\", \"U0DYCJGQ\"     Type & Screen (If Applicable):  No results found for: \"ABORH\", \"LABANTI\"    Drug/Infectious Status (If Applicable):  No results found for: \"HIV\", \"HEPCAB\"    COVID-19 Screening (If Applicable):   Lab Results   Component Value Date/Time    COVID19 DETECTED 11/29/2022 05:04 PM    COVID19 Not Detected 07/17/2020 01:10 PM           Anesthesia Evaluation  Patient summary reviewed and Nursing notes reviewed   no history of anesthetic complications:   Airway: Mallampati: II  TM distance: >3 FB   Neck ROM: full  Mouth opening: > = 3 FB   Dental:          Pulmonary:   (+)     sleep apnea:                                  Cardiovascular:  Exercise tolerance: no interval change  (+) hypertension:    (-) past MI, CAD, CABG/stent and  angina        Rate: normal                    Neuro/Psych:               GI/Hepatic/Renal:   (+) GERD:          Endo/Other:    (+) : arthritis:., malignancy/cancer.                 Abdominal:             Vascular:          Other Findings:             Anesthesia Plan      general     ASA 3       Induction: intravenous.    MIPS: Postoperative opioids intended and Prophylactic antiemetics administered.  Anesthetic plan and risks discussed with patient.      Plan discussed with CRNA.                    Stephen Barber DO   4/25/2025

## 2025-04-25 NOTE — OP NOTE
Operative Note      Patient: Cristiano Salazar  YOB: 1950  MRN: [id number]    Date of Procedure: 4/25/2025    Pre-Op Diagnosis Codes:      * Tongue mass [K14.8]    Post-Op Diagnosis: Same       Procedure(s):  DIRECT LARYNGOSCOPY WITH BIOPSY  LINGUAL TONSILLECTOMY    Surgeon(s):  Donell Clemons MD    Assistant:   * No surgical staff found *    Anesthesia: General    Estimated Blood Loss (mL): less than 50     Complications: None    Specimens:   ID Type Source Tests Collected by Time Destination   A : BASE OF TONGUE TUMOR Tissue Tongue SURGICAL PATHOLOGY Donell Clemons MD 4/25/2025 1236    B : BASE OF TONGUE TUMOR LYMPHOMA WORK UP Tissue Tongue SURGICAL PATHOLOGY Donell Clemons MD 4/25/2025 1237        Implants:  * No implants in log *      Drains: * No LDAs found *    Findings:  Infection Present At Time Of Surgery (PATOS) (choose all levels that have infection present):  No infection present  Other Findings: 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  A general anesthetic was given and the patient was intubated.  A tooth guard was placed and a rigid laryngoscope was passed into the oral cavity and into the pharynx.  Examination of the hard palate, soft palate, tongue, floor of mouth, oropharynx, hypopharynx, pyriform sinuses, esophageal inlet, base of tongue, supraglottis, and true vocal cords was done.      The base of tongue and epiglottis lingual surface had a bulbous tumor that was biopsied and then coblated.    The coblator was used to remove the lingual tonsil.  Power setting of 9 and 5, the lingual tonsil was abalated down to the muscle.  The epiglottis was protected as was the lateral pharyngeal walls.      The mouth guard and scope was removed.  There were no complications.  Cristiano Salazar was awakened, extubated, and returned to recovery in

## 2025-04-25 NOTE — DISCHARGE INSTRUCTIONS
[phone]  AFTER YOUR TONSILLECTOMY and/or ADENOIDECTOMY   Review your “BEOFRE YOUR TONSILLECTOMY” information.    Minimal spotting of blood is not unusual after tonsillectomy.  When this is noted, the patient should suck on ice chips or gargle ice water.  If the blood spotting persists go to the emergency room to be evaluated.  If there is an obvious amount of fresh blood, just go directly to the nearest emergency room.    Please do not make an effort to look in your merlin throat for 10 days.  It looks (and smells) awful and will nauseate you and upset your child.  Bad breath is usual for 7 - 10 days.  Morning dragon breath is even more usual.    Some patients appear quite miserable after tonsillectomies, some do not.  There is no way to distinguish one from the other prior to surgery.  Positive demeanors and smiles on the faces of family members are extremely valuable.    Most discomfort after a tonsillectomy is due to muscle soreness and involvement of those sore muscles, not the raw surfaces in throat.  For this reason, first swallows in the morning or swallows in the inactive sleeping period are the worse.  Your child may awake out of a sound sleep with throat pain referred to the ear as an “earache”.  This “earache” is most common at the 4th through 8th day post-surgery.  This may occur during the day as well.    Each subsequent swallow will become easier.  Therefore, the more the throat is used for swallowing after the operation, the less the overall discomfort and the sooner the throat heals.  Sometimes the complaint of ear pain dominates.  “Earaches” are considered normal after a tonsillectomy.  EXPECT SLEEPINESS.  A “perfect” recovery will be a miserable week for you.  This cannot be stated to strongly, however this “week” like the labor of childbirth is worth it.      Narcotic pain medication is no longer prescribed after tonsillectomies in kids for safety reasons.  We recommend

## 2025-05-01 LAB — SURGICAL PATHOLOGY REPORT: NORMAL

## 2025-05-03 LAB
FLOW CYTOMETRY SOURCE: NORMAL
FLOW CYTOMETRY, NODE/FLUID: NORMAL

## (undated) DEVICE — GAUZE,SPONGE,4"X4",16PLY,XRAY,STRL,LF: Brand: MEDLINE

## (undated) DEVICE — GUIDEWIRE VASC

## (undated) DEVICE — ELECTRODE PT RET AD L9FT HI MOIST COND ADH HYDRGEL CORDED

## (undated) DEVICE — STAZ ENT: Brand: MEDLINE INDUSTRIES, INC.

## (undated) DEVICE — SINGLE-USE BIOPSY FORCEPS: Brand: RADIAL JAW 4

## (undated) DEVICE — DRAPE,REIN 53X77,STERILE: Brand: MEDLINE

## (undated) DEVICE — SAVARY-GILLIARD WIRE GUIDE: Brand: SAVARY-GILLIARD

## (undated) DEVICE — SAVARY GILLIARD WIRE GUIDE: Brand: SAVARY GILLIARD

## (undated) DEVICE — GUIDEWIRE ENDOSCP L230CM DIA0.038IN DEPTH MRK STR FLX DST

## (undated) DEVICE — EVAC 70 XTRA WAND: Brand: COBLATION

## (undated) DEVICE — KIT,ANTI FOG,W/SPONGE & FLUID,SOFT PACK: Brand: MEDLINE

## (undated) DEVICE — BITEBLOCK ENDOSCP 60FR MAXI WHT POLYETH STURDY W/ VELC WVN

## (undated) DEVICE — NEEDLE FLTR 18GA L1.5IN WALL THK5UM PNK POLYPR HUB S STL

## (undated) DEVICE — GLOVE SURG SZ 7 CRM LTX FREE POLYISOPRENE POLYMER BEAD ANTI

## (undated) DEVICE — SNARE ENDOSCP M L240CM LOOP W27MM SHTH DIA2.4MM OVL FLX

## (undated) DEVICE — TOWEL,OR,DSP,ST,BLUE,DLX,XR,4/PK,20PK/CS: Brand: MEDLINE

## (undated) DEVICE — GOWN,SIRUS,NON REINFRCD,LARGE,SET IN SL: Brand: MEDLINE

## (undated) DEVICE — STAZ ENDO KIT: Brand: MEDLINE INDUSTRIES, INC.

## (undated) DEVICE — YANKAUER,FLEXIBLE HANDLE,REGLR CAPACITY: Brand: MEDLINE INDUSTRIES, INC.

## (undated) DEVICE — SOLUTION IV 1000 ML 0.9 NACL INJ USP EXCEL PLAS CONTAINER

## (undated) DEVICE — FORCEPS BX PED L160CM JAW DIA1.8MM WRK CHN 2MM W/ NDL DISP

## (undated) DEVICE — BRUSH CYTO GI W/ 3 RNG HNDL 1.8MMX120MM

## (undated) DEVICE — DISPOSABLE CYTOLOGY BRUSH: Brand: DISPOSABLE CYTOLOGY BRUSH

## (undated) DEVICE — TUBING, SUCTION, 1/4" X 12', STRAIGHT: Brand: MEDLINE

## (undated) DEVICE — MARKER,SKIN,WI/RULER AND LABELS: Brand: MEDLINE

## (undated) DEVICE — PAD N ADH W3XL4IN POLY COT SFT PERF FLM EASILY CUT ABSRB

## (undated) DEVICE — MEDICINE CUP, GRADUATED, STER: Brand: MEDLINE

## (undated) DEVICE — PROCISE MAX COBLATION WAND: Brand: COBLATION

## (undated) DEVICE — YANKAUER,BULB TIP,W/O VENT,RIGID,STERILE: Brand: MEDLINE

## (undated) DEVICE — 4-PORT MANIFOLD: Brand: NEPTUNE 2

## (undated) DEVICE — GUIDEWIRE WITH SPRING TIP - SINGLE USE: Brand: SAFEGUIDE®

## (undated) DEVICE — SPONGE,NEURO,0.5"X0.5",XR,STRL,10/PK: Brand: MEDLINE

## (undated) DEVICE — SYRINGE MED 10ML TRNSLUC BRL PLUNG BLK MRK POLYPR CTRL